# Patient Record
Sex: MALE | Race: WHITE | NOT HISPANIC OR LATINO | Employment: OTHER | URBAN - METROPOLITAN AREA
[De-identification: names, ages, dates, MRNs, and addresses within clinical notes are randomized per-mention and may not be internally consistent; named-entity substitution may affect disease eponyms.]

---

## 2017-01-01 ENCOUNTER — OFFICE VISIT (OUTPATIENT)
Dept: MEDICAL GROUP | Facility: MEDICAL CENTER | Age: 67
End: 2017-01-01
Attending: FAMILY MEDICINE
Payer: MEDICARE

## 2017-01-01 ENCOUNTER — HOME CARE VISIT (OUTPATIENT)
Dept: HOSPICE | Facility: HOSPICE | Age: 67
End: 2017-01-01
Payer: MEDICARE

## 2017-01-01 ENCOUNTER — TELEPHONE (OUTPATIENT)
Dept: CARDIOLOGY | Facility: MEDICAL CENTER | Age: 67
End: 2017-01-01

## 2017-01-01 ENCOUNTER — HOSPITAL ENCOUNTER (OUTPATIENT)
Dept: RADIOLOGY | Facility: MEDICAL CENTER | Age: 67
End: 2017-11-14
Attending: PHYSICIAN ASSISTANT
Payer: MEDICARE

## 2017-01-01 ENCOUNTER — APPOINTMENT (OUTPATIENT)
Dept: RADIOLOGY | Facility: MEDICAL CENTER | Age: 67
DRG: 435 | End: 2017-01-01
Attending: EMERGENCY MEDICINE
Payer: MEDICARE

## 2017-01-01 ENCOUNTER — HOSPITAL ENCOUNTER (OUTPATIENT)
Dept: RADIOLOGY | Facility: MEDICAL CENTER | Age: 67
End: 2017-11-14
Attending: FAMILY MEDICINE
Payer: MEDICARE

## 2017-01-01 ENCOUNTER — OFFICE VISIT (OUTPATIENT)
Dept: CARDIOLOGY | Facility: MEDICAL CENTER | Age: 67
End: 2017-01-01
Payer: MEDICARE

## 2017-01-01 ENCOUNTER — APPOINTMENT (OUTPATIENT)
Dept: RADIOLOGY | Facility: MEDICAL CENTER | Age: 67
DRG: 435 | End: 2017-01-01
Attending: INTERNAL MEDICINE
Payer: MEDICARE

## 2017-01-01 ENCOUNTER — HOSPITAL ENCOUNTER (OUTPATIENT)
Facility: MEDICAL CENTER | Age: 67
End: 2017-12-07
Attending: INTERNAL MEDICINE | Admitting: INTERNAL MEDICINE
Payer: COMMERCIAL

## 2017-01-01 ENCOUNTER — HOSPITAL ENCOUNTER (OUTPATIENT)
Facility: MEDICAL CENTER | Age: 67
End: 2017-01-01
Attending: INTERNAL MEDICINE | Admitting: INTERNAL MEDICINE
Payer: MEDICARE

## 2017-01-01 ENCOUNTER — APPOINTMENT (OUTPATIENT)
Dept: RADIOLOGY | Facility: MEDICAL CENTER | Age: 67
DRG: 948 | End: 2017-01-01
Attending: INTERNAL MEDICINE
Payer: COMMERCIAL

## 2017-01-01 ENCOUNTER — HOSPITAL ENCOUNTER (OUTPATIENT)
Dept: CARDIOLOGY | Facility: MEDICAL CENTER | Age: 67
End: 2017-02-28
Attending: INTERNAL MEDICINE
Payer: MEDICARE

## 2017-01-01 ENCOUNTER — TELEPHONE (OUTPATIENT)
Dept: MEDICAL GROUP | Facility: MEDICAL CENTER | Age: 67
End: 2017-01-01

## 2017-01-01 ENCOUNTER — APPOINTMENT (OUTPATIENT)
Dept: ADMISSIONS | Facility: MEDICAL CENTER | Age: 67
End: 2017-01-01
Payer: MEDICARE

## 2017-01-01 ENCOUNTER — HOSPITAL ENCOUNTER (INPATIENT)
Facility: MEDICAL CENTER | Age: 67
LOS: 6 days | DRG: 435 | End: 2017-12-13
Attending: INTERNAL MEDICINE | Admitting: INTERNAL MEDICINE
Payer: MEDICARE

## 2017-01-01 ENCOUNTER — APPOINTMENT (OUTPATIENT)
Dept: RADIOLOGY | Facility: MEDICAL CENTER | Age: 67
End: 2017-01-01
Attending: FAMILY MEDICINE
Payer: MEDICARE

## 2017-01-01 ENCOUNTER — HOSPITAL ENCOUNTER (OUTPATIENT)
Facility: MEDICAL CENTER | Age: 67
End: 2017-11-01
Attending: FAMILY MEDICINE
Payer: MEDICARE

## 2017-01-01 ENCOUNTER — HOSPICE ADMISSION (OUTPATIENT)
Dept: HOSPICE | Facility: HOSPICE | Age: 67
End: 2017-01-01
Payer: MEDICARE

## 2017-01-01 ENCOUNTER — HOSPITAL ENCOUNTER (OUTPATIENT)
Dept: LAB | Facility: MEDICAL CENTER | Age: 67
End: 2017-08-03
Attending: PHYSICIAN ASSISTANT
Payer: MEDICARE

## 2017-01-01 ENCOUNTER — HOSPITAL ENCOUNTER (INPATIENT)
Facility: MEDICAL CENTER | Age: 67
LOS: 1 days | DRG: 435 | End: 2017-12-07
Attending: EMERGENCY MEDICINE | Admitting: INTERNAL MEDICINE
Payer: MEDICARE

## 2017-01-01 ENCOUNTER — RESOLUTE PROFESSIONAL BILLING HOSPITAL PROF FEE (OUTPATIENT)
Dept: HOSPITALIST | Facility: MEDICAL CENTER | Age: 67
End: 2017-01-01
Payer: MEDICARE

## 2017-01-01 ENCOUNTER — APPOINTMENT (OUTPATIENT)
Dept: RADIOLOGY | Facility: MEDICAL CENTER | Age: 67
End: 2017-01-01
Attending: INTERNAL MEDICINE
Payer: COMMERCIAL

## 2017-01-01 ENCOUNTER — HOSPITAL ENCOUNTER (INPATIENT)
Facility: MEDICAL CENTER | Age: 67
LOS: 7 days | DRG: 948 | End: 2017-12-20
Attending: INTERNAL MEDICINE | Admitting: INTERNAL MEDICINE
Payer: COMMERCIAL

## 2017-01-01 ENCOUNTER — HOSPITAL ENCOUNTER (OUTPATIENT)
Facility: MEDICAL CENTER | Age: 67
End: 2017-10-11
Attending: FAMILY MEDICINE
Payer: MEDICARE

## 2017-01-01 ENCOUNTER — HOSPITAL ENCOUNTER (OUTPATIENT)
Dept: RADIOLOGY | Facility: MEDICAL CENTER | Age: 67
End: 2017-05-05
Attending: PHYSICIAN ASSISTANT
Payer: MEDICARE

## 2017-01-01 VITALS
TEMPERATURE: 100.4 F | HEART RATE: 92 BPM | SYSTOLIC BLOOD PRESSURE: 90 MMHG | RESPIRATION RATE: 24 BRPM | DIASTOLIC BLOOD PRESSURE: 50 MMHG

## 2017-01-01 VITALS
RESPIRATION RATE: 16 BRPM | HEIGHT: 70 IN | TEMPERATURE: 98.2 F | DIASTOLIC BLOOD PRESSURE: 76 MMHG | OXYGEN SATURATION: 95 % | WEIGHT: 247 LBS | SYSTOLIC BLOOD PRESSURE: 120 MMHG | HEART RATE: 60 BPM | BODY MASS INDEX: 35.36 KG/M2

## 2017-01-01 VITALS
BODY MASS INDEX: 32.07 KG/M2 | OXYGEN SATURATION: 95 % | TEMPERATURE: 97.7 F | SYSTOLIC BLOOD PRESSURE: 125 MMHG | HEIGHT: 70 IN | DIASTOLIC BLOOD PRESSURE: 80 MMHG | WEIGHT: 224 LBS | RESPIRATION RATE: 20 BRPM | HEART RATE: 80 BPM

## 2017-01-01 VITALS
OXYGEN SATURATION: 99 % | RESPIRATION RATE: 24 BRPM | HEART RATE: 108 BPM | DIASTOLIC BLOOD PRESSURE: 57 MMHG | SYSTOLIC BLOOD PRESSURE: 91 MMHG | TEMPERATURE: 103.5 F | WEIGHT: 215.39 LBS | BODY MASS INDEX: 30.91 KG/M2

## 2017-01-01 VITALS
DIASTOLIC BLOOD PRESSURE: 69 MMHG | OXYGEN SATURATION: 96 % | RESPIRATION RATE: 19 BRPM | HEART RATE: 71 BPM | WEIGHT: 220.02 LBS | HEIGHT: 70 IN | BODY MASS INDEX: 31.5 KG/M2 | SYSTOLIC BLOOD PRESSURE: 97 MMHG | TEMPERATURE: 97.3 F

## 2017-01-01 VITALS
WEIGHT: 220 LBS | SYSTOLIC BLOOD PRESSURE: 107 MMHG | DIASTOLIC BLOOD PRESSURE: 67 MMHG | BODY MASS INDEX: 31.5 KG/M2 | TEMPERATURE: 97.8 F | OXYGEN SATURATION: 98 % | HEIGHT: 70 IN | RESPIRATION RATE: 17 BRPM | HEART RATE: 50 BPM

## 2017-01-01 VITALS
HEIGHT: 70 IN | OXYGEN SATURATION: 88 % | HEART RATE: 80 BPM | TEMPERATURE: 97.2 F | SYSTOLIC BLOOD PRESSURE: 118 MMHG | WEIGHT: 218 LBS | DIASTOLIC BLOOD PRESSURE: 68 MMHG | BODY MASS INDEX: 31.21 KG/M2

## 2017-01-01 VITALS
HEART RATE: 64 BPM | DIASTOLIC BLOOD PRESSURE: 62 MMHG | SYSTOLIC BLOOD PRESSURE: 124 MMHG | TEMPERATURE: 97.6 F | HEIGHT: 70 IN | BODY MASS INDEX: 32.07 KG/M2 | OXYGEN SATURATION: 96 % | RESPIRATION RATE: 20 BRPM | WEIGHT: 224 LBS

## 2017-01-01 VITALS
RESPIRATION RATE: 18 BRPM | DIASTOLIC BLOOD PRESSURE: 78 MMHG | HEART RATE: 81 BPM | BODY MASS INDEX: 30.78 KG/M2 | OXYGEN SATURATION: 98 % | HEIGHT: 70 IN | SYSTOLIC BLOOD PRESSURE: 132 MMHG | WEIGHT: 215 LBS

## 2017-01-01 VITALS
BODY MASS INDEX: 35.5 KG/M2 | SYSTOLIC BLOOD PRESSURE: 130 MMHG | HEART RATE: 80 BPM | RESPIRATION RATE: 16 BRPM | HEIGHT: 70 IN | OXYGEN SATURATION: 95 % | WEIGHT: 248 LBS | TEMPERATURE: 97.8 F | DIASTOLIC BLOOD PRESSURE: 80 MMHG

## 2017-01-01 VITALS — DIASTOLIC BLOOD PRESSURE: 76 MMHG | RESPIRATION RATE: 20 BRPM | SYSTOLIC BLOOD PRESSURE: 103 MMHG | HEART RATE: 92 BPM

## 2017-01-01 VITALS
BODY MASS INDEX: 35.36 KG/M2 | SYSTOLIC BLOOD PRESSURE: 112 MMHG | DIASTOLIC BLOOD PRESSURE: 78 MMHG | OXYGEN SATURATION: 94 % | WEIGHT: 247 LBS | HEIGHT: 70 IN | HEART RATE: 68 BPM

## 2017-01-01 VITALS
HEART RATE: 60 BPM | DIASTOLIC BLOOD PRESSURE: 70 MMHG | TEMPERATURE: 97.3 F | OXYGEN SATURATION: 96 % | RESPIRATION RATE: 16 BRPM | BODY MASS INDEX: 31.85 KG/M2 | WEIGHT: 222 LBS | SYSTOLIC BLOOD PRESSURE: 112 MMHG

## 2017-01-01 VITALS
WEIGHT: 215.39 LBS | BODY MASS INDEX: 30.84 KG/M2 | HEIGHT: 70 IN | SYSTOLIC BLOOD PRESSURE: 132 MMHG | TEMPERATURE: 98.8 F | HEART RATE: 81 BPM | OXYGEN SATURATION: 95 % | DIASTOLIC BLOOD PRESSURE: 78 MMHG | RESPIRATION RATE: 17 BRPM

## 2017-01-01 VITALS
RESPIRATION RATE: 20 BRPM | HEIGHT: 70 IN | WEIGHT: 220 LBS | TEMPERATURE: 96.5 F | DIASTOLIC BLOOD PRESSURE: 80 MMHG | HEART RATE: 53 BPM | BODY MASS INDEX: 31.5 KG/M2 | SYSTOLIC BLOOD PRESSURE: 125 MMHG | OXYGEN SATURATION: 97 %

## 2017-01-01 VITALS
HEART RATE: 66 BPM | RESPIRATION RATE: 16 BRPM | TEMPERATURE: 97.4 F | DIASTOLIC BLOOD PRESSURE: 76 MMHG | SYSTOLIC BLOOD PRESSURE: 107 MMHG

## 2017-01-01 VITALS
SYSTOLIC BLOOD PRESSURE: 131 MMHG | RESPIRATION RATE: 12 BRPM | DIASTOLIC BLOOD PRESSURE: 82 MMHG | OXYGEN SATURATION: 96 % | HEART RATE: 73 BPM

## 2017-01-01 VITALS
OXYGEN SATURATION: 94 % | HEART RATE: 60 BPM | DIASTOLIC BLOOD PRESSURE: 80 MMHG | HEIGHT: 70 IN | SYSTOLIC BLOOD PRESSURE: 128 MMHG

## 2017-01-01 DIAGNOSIS — R19.7 DIARRHEA, UNSPECIFIED TYPE: ICD-10-CM

## 2017-01-01 DIAGNOSIS — Z23 NEED FOR TDAP VACCINATION: ICD-10-CM

## 2017-01-01 DIAGNOSIS — Z86.19 H/O HEPATITIS: ICD-10-CM

## 2017-01-01 DIAGNOSIS — R35.0 FREQUENCY OF MICTURITION: ICD-10-CM

## 2017-01-01 DIAGNOSIS — G89.29 CHRONIC LOW BACK PAIN WITH SCIATICA, SCIATICA LATERALITY UNSPECIFIED, UNSPECIFIED BACK PAIN LATERALITY: ICD-10-CM

## 2017-01-01 DIAGNOSIS — B18.2 CHRONIC HEPATITIS C WITHOUT HEPATIC COMA (HCC): ICD-10-CM

## 2017-01-01 DIAGNOSIS — B19.20 HEPATITIS C VIRUS INFECTION WITHOUT HEPATIC COMA, UNSPECIFIED CHRONICITY: ICD-10-CM

## 2017-01-01 DIAGNOSIS — M54.40 CHRONIC LOW BACK PAIN WITH SCIATICA, SCIATICA LATERALITY UNSPECIFIED, UNSPECIFIED BACK PAIN LATERALITY: ICD-10-CM

## 2017-01-01 DIAGNOSIS — E80.6 HYPERBILIRUBINEMIA: ICD-10-CM

## 2017-01-01 DIAGNOSIS — R17 JAUNDICE: ICD-10-CM

## 2017-01-01 DIAGNOSIS — I77.810 ASCENDING AORTA DILATION (HCC): ICD-10-CM

## 2017-01-01 DIAGNOSIS — N40.1 BENIGN PROSTATIC HYPERPLASIA WITH LOWER URINARY TRACT SYMPTOMS, SYMPTOM DETAILS UNSPECIFIED: ICD-10-CM

## 2017-01-01 DIAGNOSIS — J43.9 PULMONARY EMPHYSEMA, UNSPECIFIED EMPHYSEMA TYPE (HCC): ICD-10-CM

## 2017-01-01 DIAGNOSIS — K74.69 FLORID CIRRHOSIS (HCC): ICD-10-CM

## 2017-01-01 DIAGNOSIS — Z23 NEED FOR PNEUMOCOCCAL VACCINE: ICD-10-CM

## 2017-01-01 DIAGNOSIS — K86.89 MASS OF PANCREAS: ICD-10-CM

## 2017-01-01 DIAGNOSIS — E66.9 OBESITY (BMI 35.0-39.9 WITHOUT COMORBIDITY): ICD-10-CM

## 2017-01-01 DIAGNOSIS — R35.0 INCREASED FREQUENCY OF URINATION: ICD-10-CM

## 2017-01-01 DIAGNOSIS — J43.8 OTHER EMPHYSEMA (HCC): ICD-10-CM

## 2017-01-01 DIAGNOSIS — Z99.81 DEPENDENCE ON SUPPLEMENTAL OXYGEN: ICD-10-CM

## 2017-01-01 DIAGNOSIS — R06.00 DYSPNEA, UNSPECIFIED TYPE: ICD-10-CM

## 2017-01-01 DIAGNOSIS — G89.4 CHRONIC PAIN SYNDROME: ICD-10-CM

## 2017-01-01 DIAGNOSIS — R06.09 DOE (DYSPNEA ON EXERTION): ICD-10-CM

## 2017-01-01 DIAGNOSIS — K86.89 PANCREATIC MASS: ICD-10-CM

## 2017-01-01 DIAGNOSIS — Z91.89 STREPTOCOCCUS PNEUMONIAE VACCINATION INDICATED: ICD-10-CM

## 2017-01-01 DIAGNOSIS — C25.9 MALIGNANT NEOPLASM OF PANCREAS, UNSPECIFIED LOCATION OF MALIGNANCY (HCC): ICD-10-CM

## 2017-01-01 DIAGNOSIS — B18.2 CHRONIC HEPATITIS C WITH HEPATIC COMA (HCC): ICD-10-CM

## 2017-01-01 LAB
ABO GROUP BLD: NORMAL
ABO GROUP BLD: NORMAL
AFP L3 MFR SERPL: <0.5 % (ref 0–9.9)
AFP SERPL-MCNC: 2 NG/ML (ref 0–15)
ALBUMIN SERPL BCP-MCNC: 2.7 G/DL (ref 3.2–4.9)
ALBUMIN SERPL BCP-MCNC: 2.8 G/DL (ref 3.2–4.9)
ALBUMIN SERPL BCP-MCNC: 3 G/DL (ref 3.2–4.9)
ALBUMIN SERPL BCP-MCNC: 3.1 G/DL (ref 3.2–4.9)
ALBUMIN SERPL BCP-MCNC: 3.2 G/DL (ref 3.2–4.9)
ALBUMIN SERPL BCP-MCNC: 4.4 G/DL (ref 3.2–4.9)
ALBUMIN/GLOB SERPL: 0.9 G/DL
ALBUMIN/GLOB SERPL: 0.9 G/DL
ALBUMIN/GLOB SERPL: 1.1 G/DL
ALBUMIN/GLOB SERPL: 1.1 G/DL
ALBUMIN/GLOB SERPL: 1.2 G/DL
ALBUMIN/GLOB SERPL: 1.3 G/DL
ALBUMIN/GLOB SERPL: 1.3 G/DL
ALP SERPL-CCNC: 137 U/L (ref 30–99)
ALP SERPL-CCNC: 167 U/L (ref 30–99)
ALP SERPL-CCNC: 205 U/L (ref 30–99)
ALP SERPL-CCNC: 241 U/L (ref 30–99)
ALP SERPL-CCNC: 257 U/L (ref 30–99)
ALP SERPL-CCNC: 283 U/L (ref 30–99)
ALP SERPL-CCNC: 292 U/L (ref 30–99)
ALP SERPL-CCNC: 353 U/L (ref 30–99)
ALP SERPL-CCNC: 99 U/L (ref 30–99)
ALT SERPL-CCNC: 21 U/L (ref 2–50)
ALT SERPL-CCNC: 25 U/L (ref 2–50)
ALT SERPL-CCNC: 31 U/L (ref 2–50)
ALT SERPL-CCNC: 33 U/L (ref 2–50)
ALT SERPL-CCNC: 40 U/L (ref 2–50)
ALT SERPL-CCNC: 44 U/L (ref 2–50)
ALT SERPL-CCNC: 48 U/L (ref 2–50)
ALT SERPL-CCNC: 54 U/L (ref 2–50)
ALT SERPL-CCNC: 71 U/L (ref 2–50)
ANION GAP SERPL CALC-SCNC: 10 MMOL/L (ref 0–11.9)
ANION GAP SERPL CALC-SCNC: 11 MMOL/L (ref 0–11.9)
ANION GAP SERPL CALC-SCNC: 11 MMOL/L (ref 0–11.9)
ANION GAP SERPL CALC-SCNC: 6 MMOL/L (ref 0–11.9)
ANION GAP SERPL CALC-SCNC: 6 MMOL/L (ref 0–11.9)
ANION GAP SERPL CALC-SCNC: 8 MMOL/L (ref 0–11.9)
ANION GAP SERPL CALC-SCNC: 9 MMOL/L (ref 0–11.9)
ANISOCYTOSIS BLD QL SMEAR: ABNORMAL
AST SERPL-CCNC: 102 U/L (ref 12–45)
AST SERPL-CCNC: 27 U/L (ref 12–45)
AST SERPL-CCNC: 37 U/L (ref 12–45)
AST SERPL-CCNC: 41 U/L (ref 12–45)
AST SERPL-CCNC: 49 U/L (ref 12–45)
AST SERPL-CCNC: 64 U/L (ref 12–45)
AST SERPL-CCNC: 78 U/L (ref 12–45)
AST SERPL-CCNC: 82 U/L (ref 12–45)
AST SERPL-CCNC: 87 U/L (ref 12–45)
BACTERIA BLD CULT: NORMAL
BACTERIA BLD CULT: NORMAL
BACTERIA FLD AEROBE CULT: NORMAL
BACTERIA SPEC ANAEROBE CULT: NORMAL
BACTERIA STL CULT: NORMAL
BACTERIA UR CULT: NORMAL
BARCODED ABORH UBTYP: 6200
BARCODED PRD CODE UBPRD: NORMAL
BARCODED UNIT NUM UBUNT: NORMAL
BASOPHILS # BLD AUTO: 0 % (ref 0–1.8)
BASOPHILS # BLD AUTO: 0.4 % (ref 0–1.8)
BASOPHILS # BLD AUTO: 0.7 % (ref 0–1.8)
BASOPHILS # BLD AUTO: 0.7 % (ref 0–1.8)
BASOPHILS # BLD: 0 K/UL (ref 0–0.12)
BASOPHILS # BLD: 0.02 K/UL (ref 0–0.12)
BASOPHILS # BLD: 0.05 K/UL (ref 0–0.12)
BASOPHILS # BLD: 0.06 K/UL (ref 0–0.12)
BILIRUB SERPL-MCNC: 0.6 MG/DL (ref 0.1–1.5)
BILIRUB SERPL-MCNC: 16.3 MG/DL (ref 0.1–1.5)
BILIRUB SERPL-MCNC: 20.1 MG/DL (ref 0.1–1.5)
BILIRUB SERPL-MCNC: 21.8 MG/DL (ref 0.1–1.5)
BILIRUB SERPL-MCNC: 32.3 MG/DL (ref 0.1–1.5)
BILIRUB SERPL-MCNC: 34.8 MG/DL (ref 0.1–1.5)
BILIRUB SERPL-MCNC: 38.5 MG/DL (ref 0.1–1.5)
BILIRUB SERPL-MCNC: 38.6 MG/DL (ref 0.1–1.5)
BILIRUB SERPL-MCNC: 39.3 MG/DL (ref 0.1–1.5)
BLD GP AB SCN SERPL QL: NORMAL
BUN SERPL-MCNC: 10 MG/DL (ref 8–22)
BUN SERPL-MCNC: 11 MG/DL (ref 8–22)
BUN SERPL-MCNC: 12 MG/DL (ref 8–22)
BUN SERPL-MCNC: 13 MG/DL (ref 8–22)
BUN SERPL-MCNC: 14 MG/DL (ref 8–22)
BUN SERPL-MCNC: 15 MG/DL (ref 8–22)
BUN SERPL-MCNC: 15 MG/DL (ref 8–22)
BUN SERPL-MCNC: 18 MG/DL (ref 8–22)
BUN SERPL-MCNC: 25 MG/DL (ref 8–22)
C DIFF DNA SPEC QL NAA+PROBE: NEGATIVE
C DIFF TOX GENS STL QL NAA+PROBE: NEGATIVE
CALCIUM SERPL-MCNC: 8.2 MG/DL (ref 8.5–10.5)
CALCIUM SERPL-MCNC: 8.4 MG/DL (ref 8.5–10.5)
CALCIUM SERPL-MCNC: 8.5 MG/DL (ref 8.5–10.5)
CALCIUM SERPL-MCNC: 8.5 MG/DL (ref 8.5–10.5)
CALCIUM SERPL-MCNC: 8.6 MG/DL (ref 8.5–10.5)
CALCIUM SERPL-MCNC: 8.7 MG/DL (ref 8.5–10.5)
CALCIUM SERPL-MCNC: 8.7 MG/DL (ref 8.5–10.5)
CALCIUM SERPL-MCNC: 8.9 MG/DL (ref 8.5–10.5)
CALCIUM SERPL-MCNC: 9.3 MG/DL (ref 8.5–10.5)
CANCER AG125 SERPL-ACNC: 62.2 U/ML (ref 0–35)
CANCER AG19-9 SERPL-ACNC: ABNORMAL U/ML (ref 0–35)
CEA SERPL-MCNC: 20.3 NG/ML (ref 0–3)
CHLORIDE SERPL-SCNC: 101 MMOL/L (ref 96–112)
CHLORIDE SERPL-SCNC: 103 MMOL/L (ref 96–112)
CHLORIDE SERPL-SCNC: 103 MMOL/L (ref 96–112)
CHLORIDE SERPL-SCNC: 104 MMOL/L (ref 96–112)
CHLORIDE SERPL-SCNC: 105 MMOL/L (ref 96–112)
CHLORIDE SERPL-SCNC: 105 MMOL/L (ref 96–112)
CHLORIDE SERPL-SCNC: 106 MMOL/L (ref 96–112)
CO2 SERPL-SCNC: 21 MMOL/L (ref 20–33)
CO2 SERPL-SCNC: 21 MMOL/L (ref 20–33)
CO2 SERPL-SCNC: 22 MMOL/L (ref 20–33)
CO2 SERPL-SCNC: 23 MMOL/L (ref 20–33)
CO2 SERPL-SCNC: 23 MMOL/L (ref 20–33)
CO2 SERPL-SCNC: 24 MMOL/L (ref 20–33)
CO2 SERPL-SCNC: 25 MMOL/L (ref 20–33)
CO2 SERPL-SCNC: 25 MMOL/L (ref 20–33)
CO2 SERPL-SCNC: 26 MMOL/L (ref 20–33)
COMMENT 1642: NORMAL
COMPONENT FT 8504FT: NORMAL
CREAT SERPL-MCNC: 0.86 MG/DL (ref 0.5–1.4)
CREAT SERPL-MCNC: 0.9 MG/DL (ref 0.5–1.4)
CREAT SERPL-MCNC: 0.91 MG/DL (ref 0.5–1.4)
CREAT SERPL-MCNC: 0.93 MG/DL (ref 0.5–1.4)
CREAT SERPL-MCNC: 0.93 MG/DL (ref 0.5–1.4)
CREAT SERPL-MCNC: 0.96 MG/DL (ref 0.5–1.4)
CREAT SERPL-MCNC: 0.98 MG/DL (ref 0.5–1.4)
CREAT SERPL-MCNC: 1.05 MG/DL (ref 0.5–1.4)
CREAT SERPL-MCNC: 1.05 MG/DL (ref 0.5–1.4)
E COLI SXT1+2 STL IA: NORMAL
E COLI SXT1+2 STL IA: NORMAL
EKG IMPRESSION: NORMAL
EOSINOPHIL # BLD AUTO: 0 K/UL (ref 0–0.51)
EOSINOPHIL # BLD AUTO: 0.01 K/UL (ref 0–0.51)
EOSINOPHIL # BLD AUTO: 0.06 K/UL (ref 0–0.51)
EOSINOPHIL # BLD AUTO: 0.09 K/UL (ref 0–0.51)
EOSINOPHIL NFR BLD: 0 % (ref 0–6.9)
EOSINOPHIL NFR BLD: 0.1 % (ref 0–6.9)
EOSINOPHIL NFR BLD: 1.1 % (ref 0–6.9)
EOSINOPHIL NFR BLD: 1.3 % (ref 0–6.9)
ERYTHROCYTE [DISTWIDTH] IN BLOOD BY AUTOMATED COUNT: 77.1 FL (ref 35.9–50)
ERYTHROCYTE [DISTWIDTH] IN BLOOD BY AUTOMATED COUNT: 77.2 FL (ref 35.9–50)
ERYTHROCYTE [DISTWIDTH] IN BLOOD BY AUTOMATED COUNT: 79.1 FL (ref 35.9–50)
ERYTHROCYTE [DISTWIDTH] IN BLOOD BY AUTOMATED COUNT: 79.6 FL (ref 35.9–50)
GFR SERPL CREATININE-BSD FRML MDRD: >60 ML/MIN/1.73 M 2
GLOBULIN SER CALC-MCNC: 2.3 G/DL (ref 1.9–3.5)
GLOBULIN SER CALC-MCNC: 2.3 G/DL (ref 1.9–3.5)
GLOBULIN SER CALC-MCNC: 2.4 G/DL (ref 1.9–3.5)
GLOBULIN SER CALC-MCNC: 2.5 G/DL (ref 1.9–3.5)
GLOBULIN SER CALC-MCNC: 2.6 G/DL (ref 1.9–3.5)
GLOBULIN SER CALC-MCNC: 3 G/DL (ref 1.9–3.5)
GLOBULIN SER CALC-MCNC: 3.1 G/DL (ref 1.9–3.5)
GLOBULIN SER CALC-MCNC: 3.2 G/DL (ref 1.9–3.5)
GLOBULIN SER CALC-MCNC: 3.5 G/DL (ref 1.9–3.5)
GLUCOSE SERPL-MCNC: 102 MG/DL (ref 65–99)
GLUCOSE SERPL-MCNC: 117 MG/DL (ref 65–99)
GLUCOSE SERPL-MCNC: 119 MG/DL (ref 65–99)
GLUCOSE SERPL-MCNC: 123 MG/DL (ref 65–99)
GLUCOSE SERPL-MCNC: 123 MG/DL (ref 65–99)
GLUCOSE SERPL-MCNC: 144 MG/DL (ref 65–99)
GLUCOSE SERPL-MCNC: 90 MG/DL (ref 65–99)
GLUCOSE SERPL-MCNC: 91 MG/DL (ref 65–99)
GLUCOSE SERPL-MCNC: 93 MG/DL (ref 65–99)
GRAM STN SPEC: NORMAL
GRAM STN SPEC: NORMAL
HCT VFR BLD AUTO: 24.9 % (ref 42–52)
HCT VFR BLD AUTO: 26.1 % (ref 42–52)
HCT VFR BLD AUTO: 26.8 % (ref 42–52)
HCT VFR BLD AUTO: 29.1 % (ref 42–52)
HCV RNA SERPL NAA+PROBE-ACNC: NORMAL IU/ML
HGB BLD-MCNC: 10.5 G/DL (ref 14–18)
HGB BLD-MCNC: 8.9 G/DL (ref 14–18)
HGB BLD-MCNC: 9.2 G/DL (ref 14–18)
HGB BLD-MCNC: 9.6 G/DL (ref 14–18)
IMM GRANULOCYTES # BLD AUTO: 0.12 K/UL (ref 0–0.11)
IMM GRANULOCYTES # BLD AUTO: 0.13 K/UL (ref 0–0.11)
IMM GRANULOCYTES # BLD AUTO: 0.14 K/UL (ref 0–0.11)
IMM GRANULOCYTES NFR BLD AUTO: 1.5 % (ref 0–0.9)
IMM GRANULOCYTES NFR BLD AUTO: 1.9 % (ref 0–0.9)
IMM GRANULOCYTES NFR BLD AUTO: 2.7 % (ref 0–0.9)
INR PPP: 1.16 (ref 0.87–1.13)
INR PPP: 1.22 (ref 0.87–1.13)
INR PPP: 1.74 (ref 0.87–1.13)
LG PLATELETS BLD QL SMEAR: NORMAL
LIPASE SERPL-CCNC: 158 U/L (ref 11–82)
LV EJECT FRACT  99904: 65
LV EJECT FRACT MOD 2C 99903: 63.88
LV EJECT FRACT MOD 4C 99902: 71.69
LV EJECT FRACT MOD BP 99901: 68.09
LYMPHOCYTES # BLD AUTO: 0.39 K/UL (ref 1–4.8)
LYMPHOCYTES # BLD AUTO: 0.48 K/UL (ref 1–4.8)
LYMPHOCYTES # BLD AUTO: 0.64 K/UL (ref 1–4.8)
LYMPHOCYTES # BLD AUTO: 0.64 K/UL (ref 1–4.8)
LYMPHOCYTES NFR BLD: 12.2 % (ref 22–41)
LYMPHOCYTES NFR BLD: 4.8 % (ref 22–41)
LYMPHOCYTES NFR BLD: 9.4 % (ref 22–41)
LYMPHOCYTES NFR BLD: 9.6 % (ref 22–41)
MACROCYTES BLD QL SMEAR: ABNORMAL
MAGNESIUM SERPL-MCNC: 1.9 MG/DL (ref 1.5–2.5)
MANUAL DIFF BLD: NORMAL
MCH RBC QN AUTO: 31.6 PG (ref 27–33)
MCH RBC QN AUTO: 31.7 PG (ref 27–33)
MCH RBC QN AUTO: 32.1 PG (ref 27–33)
MCH RBC QN AUTO: 33.2 PG (ref 27–33)
MCHC RBC AUTO-ENTMCNC: 35.2 G/DL (ref 33.7–35.3)
MCHC RBC AUTO-ENTMCNC: 35.7 G/DL (ref 33.7–35.3)
MCHC RBC AUTO-ENTMCNC: 35.8 G/DL (ref 33.7–35.3)
MCHC RBC AUTO-ENTMCNC: 36.1 G/DL (ref 33.7–35.3)
MCV RBC AUTO: 87.7 FL (ref 81.4–97.8)
MCV RBC AUTO: 88.4 FL (ref 81.4–97.8)
MCV RBC AUTO: 90.9 FL (ref 81.4–97.8)
MCV RBC AUTO: 92.9 FL (ref 81.4–97.8)
MONOCYTES # BLD AUTO: 0.26 K/UL (ref 0–0.85)
MONOCYTES # BLD AUTO: 0.52 K/UL (ref 0–0.85)
MONOCYTES # BLD AUTO: 0.58 K/UL (ref 0–0.85)
MONOCYTES # BLD AUTO: 0.7 K/UL (ref 0–0.85)
MONOCYTES NFR BLD AUTO: 5.2 % (ref 0–13.4)
MONOCYTES NFR BLD AUTO: 8.5 % (ref 0–13.4)
MONOCYTES NFR BLD AUTO: 8.6 % (ref 0–13.4)
MONOCYTES NFR BLD AUTO: 9.9 % (ref 0–13.4)
MORPHOLOGY BLD-IMP: NORMAL
MORPHOLOGY BLD-IMP: NORMAL
NEUTROPHILS # BLD AUTO: 3.86 K/UL (ref 1.82–7.42)
NEUTROPHILS # BLD AUTO: 4.26 K/UL (ref 1.82–7.42)
NEUTROPHILS # BLD AUTO: 5.3 K/UL (ref 1.82–7.42)
NEUTROPHILS # BLD AUTO: 6.84 K/UL (ref 1.82–7.42)
NEUTROPHILS NFR BLD: 73.7 % (ref 44–72)
NEUTROPHILS NFR BLD: 78.2 % (ref 44–72)
NEUTROPHILS NFR BLD: 82.6 % (ref 44–72)
NEUTROPHILS NFR BLD: 84.3 % (ref 44–72)
NEUTS BAND NFR BLD MANUAL: 2.6 % (ref 0–10)
NRBC # BLD AUTO: 0 K/UL
NRBC BLD AUTO-RTO: 0 /100 WBC
PATHOLOGY CONSULT NOTE: NORMAL
PHOSPHATE SERPL-MCNC: 2.3 MG/DL (ref 2.5–4.5)
PLATELET # BLD AUTO: 185 K/UL (ref 164–446)
PLATELET # BLD AUTO: 193 K/UL (ref 164–446)
PLATELET # BLD AUTO: 220 K/UL (ref 164–446)
PLATELET # BLD AUTO: 232 K/UL (ref 164–446)
PLATELET BLD QL SMEAR: NORMAL
PLATELET BLD QL SMEAR: NORMAL
PMV BLD AUTO: 11.2 FL (ref 9–12.9)
PMV BLD AUTO: 11.3 FL (ref 9–12.9)
PMV BLD AUTO: 11.4 FL (ref 9–12.9)
PMV BLD AUTO: 12 FL (ref 9–12.9)
POIKILOCYTOSIS BLD QL SMEAR: NORMAL
POIKILOCYTOSIS BLD QL SMEAR: NORMAL
POLYCHROMASIA BLD QL SMEAR: NORMAL
POTASSIUM SERPL-SCNC: 3.6 MMOL/L (ref 3.6–5.5)
POTASSIUM SERPL-SCNC: 3.6 MMOL/L (ref 3.6–5.5)
POTASSIUM SERPL-SCNC: 3.7 MMOL/L (ref 3.6–5.5)
POTASSIUM SERPL-SCNC: 3.8 MMOL/L (ref 3.6–5.5)
POTASSIUM SERPL-SCNC: 4 MMOL/L (ref 3.6–5.5)
POTASSIUM SERPL-SCNC: 4 MMOL/L (ref 3.6–5.5)
POTASSIUM SERPL-SCNC: 4.1 MMOL/L (ref 3.6–5.5)
POTASSIUM SERPL-SCNC: 4.3 MMOL/L (ref 3.6–5.5)
POTASSIUM SERPL-SCNC: 4.3 MMOL/L (ref 3.6–5.5)
PRODUCT TYPE UPROD: NORMAL
PROT SERPL-MCNC: 5 G/DL (ref 6–8.2)
PROT SERPL-MCNC: 5.2 G/DL (ref 6–8.2)
PROT SERPL-MCNC: 5.3 G/DL (ref 6–8.2)
PROT SERPL-MCNC: 5.3 G/DL (ref 6–8.2)
PROT SERPL-MCNC: 5.7 G/DL (ref 6–8.2)
PROT SERPL-MCNC: 5.9 G/DL (ref 6–8.2)
PROT SERPL-MCNC: 6 G/DL (ref 6–8.2)
PROT SERPL-MCNC: 6.2 G/DL (ref 6–8.2)
PROT SERPL-MCNC: 7.9 G/DL (ref 6–8.2)
PROTHROMBIN TIME: 14.5 SEC (ref 12–14.6)
PROTHROMBIN TIME: 15.1 SEC (ref 12–14.6)
PROTHROMBIN TIME: 20 SEC (ref 12–14.6)
RBC # BLD AUTO: 2.68 M/UL (ref 4.7–6.1)
RBC # BLD AUTO: 2.87 M/UL (ref 4.7–6.1)
RBC # BLD AUTO: 3.03 M/UL (ref 4.7–6.1)
RBC # BLD AUTO: 3.32 M/UL (ref 4.7–6.1)
RBC BLD AUTO: PRESENT
RBC BLD AUTO: PRESENT
RH BLD: NORMAL
RH BLD: NORMAL
SIGNIFICANT IND 70042: NORMAL
SITE SITE: NORMAL
SODIUM SERPL-SCNC: 133 MMOL/L (ref 135–145)
SODIUM SERPL-SCNC: 134 MMOL/L (ref 135–145)
SODIUM SERPL-SCNC: 134 MMOL/L (ref 135–145)
SODIUM SERPL-SCNC: 135 MMOL/L (ref 135–145)
SODIUM SERPL-SCNC: 136 MMOL/L (ref 135–145)
SODIUM SERPL-SCNC: 138 MMOL/L (ref 135–145)
SODIUM SERPL-SCNC: 140 MMOL/L (ref 135–145)
SOURCE SOURCE: NORMAL
TARGETS BLD QL SMEAR: NORMAL
TARGETS BLD QL SMEAR: NORMAL
TEST INFO  93644: NORMAL
UNIT STATUS USTAT: NORMAL
WBC # BLD AUTO: 5 K/UL (ref 4.8–10.8)
WBC # BLD AUTO: 5.2 K/UL (ref 4.8–10.8)
WBC # BLD AUTO: 6.8 K/UL (ref 4.8–10.8)
WBC # BLD AUTO: 8.1 K/UL (ref 4.8–10.8)

## 2017-01-01 PROCEDURE — A9270 NON-COVERED ITEM OR SERVICE: HCPCS | Performed by: INTERNAL MEDICINE

## 2017-01-01 PROCEDURE — 36415 COLL VENOUS BLD VENIPUNCTURE: CPT

## 2017-01-01 PROCEDURE — 82107 ALPHA-FETOPROTEIN L3: CPT

## 2017-01-01 PROCEDURE — 99214 OFFICE O/P EST MOD 30 MIN: CPT | Performed by: FAMILY MEDICINE

## 2017-01-01 PROCEDURE — 99213 OFFICE O/P EST LOW 20 MIN: CPT | Mod: 25 | Performed by: FAMILY MEDICINE

## 2017-01-01 PROCEDURE — 700102 HCHG RX REV CODE 250 W/ 637 OVERRIDE(OP): Performed by: STUDENT IN AN ORGANIZED HEALTH CARE EDUCATION/TRAINING PROGRAM

## 2017-01-01 PROCEDURE — 700111 HCHG RX REV CODE 636 W/ 250 OVERRIDE (IP): Performed by: INTERNAL MEDICINE

## 2017-01-01 PROCEDURE — 700102 HCHG RX REV CODE 250 W/ 637 OVERRIDE(OP): Performed by: INTERNAL MEDICINE

## 2017-01-01 PROCEDURE — 700111 HCHG RX REV CODE 636 W/ 250 OVERRIDE (IP): Performed by: STUDENT IN AN ORGANIZED HEALTH CARE EDUCATION/TRAINING PROGRAM

## 2017-01-01 PROCEDURE — 80053 COMPREHEN METABOLIC PANEL: CPT

## 2017-01-01 PROCEDURE — 700111 HCHG RX REV CODE 636 W/ 250 OVERRIDE (IP)

## 2017-01-01 PROCEDURE — 86304 IMMUNOASSAY TUMOR CA 125: CPT

## 2017-01-01 PROCEDURE — 88312 SPECIAL STAINS GROUP 1: CPT

## 2017-01-01 PROCEDURE — A9270 NON-COVERED ITEM OR SERVICE: HCPCS | Performed by: STUDENT IN AN ORGANIZED HEALTH CARE EDUCATION/TRAINING PROGRAM

## 2017-01-01 PROCEDURE — 700117 HCHG RX CONTRAST REV CODE 255: Performed by: RADIOLOGY

## 2017-01-01 PROCEDURE — 85025 COMPLETE CBC W/AUTO DIFF WBC: CPT

## 2017-01-01 PROCEDURE — 87086 URINE CULTURE/COLONY COUNT: CPT

## 2017-01-01 PROCEDURE — 36430 TRANSFUSION BLD/BLD COMPNT: CPT

## 2017-01-01 PROCEDURE — 99223 1ST HOSP IP/OBS HIGH 75: CPT | Mod: 25 | Performed by: INTERNAL MEDICINE

## 2017-01-01 PROCEDURE — 87045 FECES CULTURE AEROBIC BACT: CPT

## 2017-01-01 PROCEDURE — 99232 SBSQ HOSP IP/OBS MODERATE 35: CPT | Performed by: INTERNAL MEDICINE

## 2017-01-01 PROCEDURE — G8432 DEP SCR NOT DOC, RNG: HCPCS | Performed by: FAMILY MEDICINE

## 2017-01-01 PROCEDURE — 770006 HCHG ROOM/CARE - MED/SURG/GYN SEMI*

## 2017-01-01 PROCEDURE — 88305 TISSUE EXAM BY PATHOLOGIST: CPT

## 2017-01-01 PROCEDURE — 700111 HCHG RX REV CODE 636 W/ 250 OVERRIDE (IP): Performed by: RADIOLOGY

## 2017-01-01 PROCEDURE — 700112 HCHG RX REV CODE 229: Performed by: INTERNAL MEDICINE

## 2017-01-01 PROCEDURE — 99213 OFFICE O/P EST LOW 20 MIN: CPT | Performed by: FAMILY MEDICINE

## 2017-01-01 PROCEDURE — BF10YZZ FLUOROSCOPY OF BILE DUCTS USING OTHER CONTRAST: ICD-10-PCS | Performed by: RADIOLOGY

## 2017-01-01 PROCEDURE — A9270 NON-COVERED ITEM OR SERVICE: HCPCS | Performed by: FAMILY MEDICINE

## 2017-01-01 PROCEDURE — 160009 HCHG ANES TIME/MIN: Performed by: INTERNAL MEDICINE

## 2017-01-01 PROCEDURE — 94760 N-INVAS EAR/PLS OXIMETRY 1: CPT

## 2017-01-01 PROCEDURE — G8419 CALC BMI OUT NRM PARAM NOF/U: HCPCS | Performed by: FAMILY MEDICINE

## 2017-01-01 PROCEDURE — G0009 ADMIN PNEUMOCOCCAL VACCINE: HCPCS | Performed by: FAMILY MEDICINE

## 2017-01-01 PROCEDURE — 87040 BLOOD CULTURE FOR BACTERIA: CPT | Mod: 91

## 2017-01-01 PROCEDURE — 700117 HCHG RX CONTRAST REV CODE 255: Performed by: EMERGENCY MEDICINE

## 2017-01-01 PROCEDURE — 99233 SBSQ HOSP IP/OBS HIGH 50: CPT | Performed by: INTERNAL MEDICINE

## 2017-01-01 PROCEDURE — T2045 HOSPICE GENERAL CARE: HCPCS

## 2017-01-01 PROCEDURE — 700102 HCHG RX REV CODE 250 W/ 637 OVERRIDE(OP): Performed by: FAMILY MEDICINE

## 2017-01-01 PROCEDURE — 160208 HCHG ENDO MINUTES - EA ADDL 1 MIN LEVEL 4: Performed by: INTERNAL MEDICINE

## 2017-01-01 PROCEDURE — 4040F PNEUMOC VAC/ADMIN/RCVD: CPT | Performed by: FAMILY MEDICINE

## 2017-01-01 PROCEDURE — 85610 PROTHROMBIN TIME: CPT

## 2017-01-01 PROCEDURE — G8599 NO ASA/ANTIPLAT THER USE RNG: HCPCS | Performed by: FAMILY MEDICINE

## 2017-01-01 PROCEDURE — 93306 TTE W/DOPPLER COMPLETE: CPT | Mod: 26 | Performed by: INTERNAL MEDICINE

## 2017-01-01 PROCEDURE — 82378 CARCINOEMBRYONIC ANTIGEN: CPT

## 2017-01-01 PROCEDURE — 1036F TOBACCO NON-USER: CPT | Performed by: FAMILY MEDICINE

## 2017-01-01 PROCEDURE — 3017F COLORECTAL CA SCREEN DOC REV: CPT | Mod: 1P | Performed by: FAMILY MEDICINE

## 2017-01-01 PROCEDURE — 84100 ASSAY OF PHOSPHORUS: CPT

## 2017-01-01 PROCEDURE — G0299 HHS/HOSPICE OF RN EA 15 MIN: HCPCS

## 2017-01-01 PROCEDURE — G8484 FLU IMMUNIZE NO ADMIN: HCPCS | Performed by: FAMILY MEDICINE

## 2017-01-01 PROCEDURE — 71260 CT THORAX DX C+: CPT

## 2017-01-01 PROCEDURE — 86901 BLOOD TYPING SEROLOGIC RH(D): CPT | Mod: 91

## 2017-01-01 PROCEDURE — 85027 COMPLETE CBC AUTOMATED: CPT

## 2017-01-01 PROCEDURE — 665036 HSPC NOTICE OF ELECTION NOE

## 2017-01-01 PROCEDURE — 87493 C DIFF AMPLIFIED PROBE: CPT

## 2017-01-01 PROCEDURE — 85007 BL SMEAR W/DIFF WBC COUNT: CPT

## 2017-01-01 PROCEDURE — 99285 EMERGENCY DEPT VISIT HI MDM: CPT

## 2017-01-01 PROCEDURE — 700111 HCHG RX REV CODE 636 W/ 250 OVERRIDE (IP): Performed by: HOSPITALIST

## 2017-01-01 PROCEDURE — 99214 OFFICE O/P EST MOD 30 MIN: CPT | Mod: 25 | Performed by: FAMILY MEDICINE

## 2017-01-01 PROCEDURE — 87899 AGENT NOS ASSAY W/OPTIC: CPT

## 2017-01-01 PROCEDURE — 99239 HOSP IP/OBS DSCHRG MGMT >30: CPT | Performed by: HOSPITALIST

## 2017-01-01 PROCEDURE — 700111 HCHG RX REV CODE 636 W/ 250 OVERRIDE (IP): Performed by: EMERGENCY MEDICINE

## 2017-01-01 PROCEDURE — 88172 CYTP DX EVAL FNA 1ST EA SITE: CPT

## 2017-01-01 PROCEDURE — 700105 HCHG RX REV CODE 258

## 2017-01-01 PROCEDURE — 700105 HCHG RX REV CODE 258: Performed by: INTERNAL MEDICINE

## 2017-01-01 PROCEDURE — 87205 SMEAR GRAM STAIN: CPT

## 2017-01-01 PROCEDURE — 76705 ECHO EXAM OF ABDOMEN: CPT

## 2017-01-01 PROCEDURE — 88307 TISSUE EXAM BY PATHOLOGIST: CPT

## 2017-01-01 PROCEDURE — 700117 HCHG RX CONTRAST REV CODE 255

## 2017-01-01 PROCEDURE — 86900 BLOOD TYPING SEROLOGIC ABO: CPT

## 2017-01-01 PROCEDURE — 30253K1: ICD-10-PCS | Performed by: INTERNAL MEDICINE

## 2017-01-01 PROCEDURE — 160035 HCHG PACU - 1ST 60 MINS PHASE I: Performed by: INTERNAL MEDICINE

## 2017-01-01 PROCEDURE — 1101F PT FALLS ASSESS-DOCD LE1/YR: CPT | Performed by: FAMILY MEDICINE

## 2017-01-01 PROCEDURE — 83735 ASSAY OF MAGNESIUM: CPT

## 2017-01-01 PROCEDURE — 99497 ADVNCD CARE PLAN 30 MIN: CPT | Performed by: INTERNAL MEDICINE

## 2017-01-01 PROCEDURE — 93306 TTE W/DOPPLER COMPLETE: CPT

## 2017-01-01 PROCEDURE — 160036 HCHG PACU - EA ADDL 30 MINS PHASE I: Performed by: INTERNAL MEDICINE

## 2017-01-01 PROCEDURE — 0F9530Z DRAINAGE OF RIGHT HEPATIC DUCT WITH DRAINAGE DEVICE, PERCUTANEOUS APPROACH: ICD-10-PCS | Performed by: RADIOLOGY

## 2017-01-01 PROCEDURE — 87070 CULTURE OTHR SPECIMN AEROBIC: CPT

## 2017-01-01 PROCEDURE — 700105 HCHG RX REV CODE 258: Performed by: STUDENT IN AN ORGANIZED HEALTH CARE EDUCATION/TRAINING PROGRAM

## 2017-01-01 PROCEDURE — 99212 OFFICE O/P EST SF 10 MIN: CPT | Performed by: FAMILY MEDICINE

## 2017-01-01 PROCEDURE — 96374 THER/PROPH/DIAG INJ IV PUSH: CPT

## 2017-01-01 PROCEDURE — P9017 PLASMA 1 DONOR FRZ W/IN 8 HR: HCPCS | Mod: 91

## 2017-01-01 PROCEDURE — 160002 HCHG RECOVERY MINUTES (STAT): Performed by: INTERNAL MEDICINE

## 2017-01-01 PROCEDURE — 700101 HCHG RX REV CODE 250

## 2017-01-01 PROCEDURE — 88173 CYTOPATH EVAL FNA REPORT: CPT | Mod: 91

## 2017-01-01 PROCEDURE — 99213 OFFICE O/P EST LOW 20 MIN: CPT | Performed by: INTERNAL MEDICINE

## 2017-01-01 PROCEDURE — 90670 PCV13 VACCINE IM: CPT | Performed by: FAMILY MEDICINE

## 2017-01-01 PROCEDURE — 87522 HEPATITIS C REVRS TRNSCRPJ: CPT

## 2017-01-01 PROCEDURE — G0155 HHCP-SVS OF CSW,EA 15 MIN: HCPCS

## 2017-01-01 PROCEDURE — 96376 TX/PRO/DX INJ SAME DRUG ADON: CPT

## 2017-01-01 PROCEDURE — 83690 ASSAY OF LIPASE: CPT

## 2017-01-01 PROCEDURE — 90471 IMMUNIZATION ADMIN: CPT | Performed by: FAMILY MEDICINE

## 2017-01-01 PROCEDURE — 99153 MOD SED SAME PHYS/QHP EA: CPT

## 2017-01-01 PROCEDURE — 86301 IMMUNOASSAY TUMOR CA 19-9: CPT

## 2017-01-01 PROCEDURE — 93005 ELECTROCARDIOGRAM TRACING: CPT

## 2017-01-01 PROCEDURE — 1101F PT FALLS ASSESS-DOCD LE1/YR: CPT | Mod: 8P | Performed by: FAMILY MEDICINE

## 2017-01-01 PROCEDURE — 76775 US EXAM ABDO BACK WALL LIM: CPT

## 2017-01-01 PROCEDURE — 99233 SBSQ HOSP IP/OBS HIGH 50: CPT | Performed by: HOSPITALIST

## 2017-01-01 PROCEDURE — 87075 CULTR BACTERIA EXCEPT BLOOD: CPT

## 2017-01-01 PROCEDURE — 160048 HCHG OR STATISTICAL LEVEL 1-5: Performed by: INTERNAL MEDICINE

## 2017-01-01 PROCEDURE — 302128 INFUSION PUMP: Performed by: INTERNAL MEDICINE

## 2017-01-01 PROCEDURE — 160203 HCHG ENDO MINUTES - 1ST 30 MINS LEVEL 4: Performed by: INTERNAL MEDICINE

## 2017-01-01 PROCEDURE — 93005 ELECTROCARDIOGRAM TRACING: CPT | Performed by: EMERGENCY MEDICINE

## 2017-01-01 PROCEDURE — 86850 RBC ANTIBODY SCREEN: CPT

## 2017-01-01 PROCEDURE — 99203 OFFICE O/P NEW LOW 30 MIN: CPT | Performed by: INTERNAL MEDICINE

## 2017-01-01 PROCEDURE — P9017 PLASMA 1 DONOR FRZ W/IN 8 HR: HCPCS

## 2017-01-01 PROCEDURE — 500066 HCHG BITE BLOCK, ECT: Performed by: INTERNAL MEDICINE

## 2017-01-01 PROCEDURE — 302129 PCA PLUS: Performed by: INTERNAL MEDICINE

## 2017-01-01 PROCEDURE — 87046 STOOL CULTR AEROBIC BACT EA: CPT

## 2017-01-01 RX ORDER — LORAZEPAM 2 MG/ML
2 INJECTION INTRAMUSCULAR
Status: DISCONTINUED | OUTPATIENT
Start: 2017-01-01 | End: 2017-01-01

## 2017-01-01 RX ORDER — HYDROMORPHONE HYDROCHLORIDE 2 MG/ML
1 INJECTION, SOLUTION INTRAMUSCULAR; INTRAVENOUS; SUBCUTANEOUS
Status: DISCONTINUED | OUTPATIENT
Start: 2017-01-01 | End: 2017-01-01 | Stop reason: HOSPADM

## 2017-01-01 RX ORDER — MORPHINE SULFATE 100 MG/5ML
10 SOLUTION ORAL
Status: DISCONTINUED | OUTPATIENT
Start: 2017-01-01 | End: 2017-01-01 | Stop reason: HOSPADM

## 2017-01-01 RX ORDER — ONDANSETRON 4 MG/1
4 TABLET, ORALLY DISINTEGRATING ORAL EVERY 4 HOURS PRN
Status: ON HOLD | COMMUNITY
End: 2017-01-01

## 2017-01-01 RX ORDER — HALOPERIDOL 5 MG/ML
.5-2 INJECTION INTRAMUSCULAR EVERY 6 HOURS PRN
Status: DISCONTINUED | OUTPATIENT
Start: 2017-01-01 | End: 2017-01-01 | Stop reason: HOSPADM

## 2017-01-01 RX ORDER — METHADONE HYDROCHLORIDE 10 MG/1
20 TABLET ORAL DAILY
Status: DISCONTINUED | OUTPATIENT
Start: 2017-01-01 | End: 2017-01-01 | Stop reason: HOSPADM

## 2017-01-01 RX ORDER — MORPHINE SULFATE 10 MG/ML
10 INJECTION, SOLUTION INTRAMUSCULAR; INTRAVENOUS
Status: DISCONTINUED | OUTPATIENT
Start: 2017-01-01 | End: 2017-01-01 | Stop reason: HOSPADM

## 2017-01-01 RX ORDER — METHADONE HYDROCHLORIDE 10 MG/ML
100 CONCENTRATE ORAL DAILY
Status: ON HOLD | COMMUNITY
End: 2017-01-01

## 2017-01-01 RX ORDER — DIPHENOXYLATE HYDROCHLORIDE AND ATROPINE SULFATE 2.5; .025 MG/1; MG/1
TABLET ORAL
Qty: 30 TAB | Refills: 0 | Status: ON HOLD | OUTPATIENT
Start: 2017-01-01 | End: 2017-01-01

## 2017-01-01 RX ORDER — OXYCODONE HYDROCHLORIDE 10 MG/1
10 TABLET ORAL
Status: DISCONTINUED | OUTPATIENT
Start: 2017-01-01 | End: 2017-01-01

## 2017-01-01 RX ORDER — IPRATROPIUM BROMIDE AND ALBUTEROL SULFATE 2.5; .5 MG/3ML; MG/3ML
3 SOLUTION RESPIRATORY (INHALATION)
Status: DISCONTINUED | OUTPATIENT
Start: 2017-01-01 | End: 2017-01-01 | Stop reason: HOSPADM

## 2017-01-01 RX ORDER — PHYTONADIONE 10 MG/ML
10 INJECTION, EMULSION INTRAMUSCULAR; INTRAVENOUS; SUBCUTANEOUS
Status: ON HOLD | COMMUNITY
End: 2017-01-01

## 2017-01-01 RX ORDER — AMOXICILLIN 250 MG
2 CAPSULE ORAL 2 TIMES DAILY
Status: DISCONTINUED | OUTPATIENT
Start: 2017-01-01 | End: 2017-01-01

## 2017-01-01 RX ORDER — GABAPENTIN 300 MG/1
300 CAPSULE ORAL 3 TIMES DAILY
Status: DISCONTINUED | OUTPATIENT
Start: 2017-01-01 | End: 2017-01-01 | Stop reason: HOSPADM

## 2017-01-01 RX ORDER — ONDANSETRON 2 MG/ML
4 INJECTION INTRAMUSCULAR; INTRAVENOUS EVERY 4 HOURS PRN
Status: DISCONTINUED | OUTPATIENT
Start: 2017-01-01 | End: 2017-01-01 | Stop reason: HOSPADM

## 2017-01-01 RX ORDER — OXYCODONE HYDROCHLORIDE 5 MG/1
5 TABLET ORAL
Status: DISCONTINUED | OUTPATIENT
Start: 2017-01-01 | End: 2017-01-01 | Stop reason: HOSPADM

## 2017-01-01 RX ORDER — HYDROMORPHONE HYDROCHLORIDE 4 MG/1
4 TABLET ORAL
Status: DISCONTINUED | OUTPATIENT
Start: 2017-01-01 | End: 2017-01-01

## 2017-01-01 RX ORDER — AMOXICILLIN 250 MG
1 CAPSULE ORAL NIGHTLY
Status: DISCONTINUED | OUTPATIENT
Start: 2017-01-01 | End: 2017-01-01 | Stop reason: HOSPADM

## 2017-01-01 RX ORDER — POLYETHYLENE GLYCOL 3350 17 G/17G
1 POWDER, FOR SOLUTION ORAL
Status: DISCONTINUED | OUTPATIENT
Start: 2017-01-01 | End: 2017-01-01 | Stop reason: HOSPADM

## 2017-01-01 RX ORDER — AMOXICILLIN 250 MG
2 CAPSULE ORAL 2 TIMES DAILY
Status: DISCONTINUED | OUTPATIENT
Start: 2017-01-01 | End: 2017-01-01 | Stop reason: HOSPADM

## 2017-01-01 RX ORDER — METHADONE HYDROCHLORIDE 10 MG/1
20 TABLET ORAL DAILY
Status: DISCONTINUED | OUTPATIENT
Start: 2017-01-01 | End: 2017-01-01

## 2017-01-01 RX ORDER — TIOTROPIUM BROMIDE 18 UG/1
18 CAPSULE ORAL; RESPIRATORY (INHALATION) DAILY
Status: ON HOLD | COMMUNITY
End: 2017-01-01

## 2017-01-01 RX ORDER — OXYCODONE HYDROCHLORIDE 10 MG/1
10 TABLET ORAL
Status: ON HOLD | COMMUNITY
End: 2017-01-01

## 2017-01-01 RX ORDER — PHYTONADIONE 10 MG/ML
10 INJECTION, EMULSION INTRAMUSCULAR; INTRAVENOUS; SUBCUTANEOUS DAILY
Status: DISCONTINUED | OUTPATIENT
Start: 2017-01-01 | End: 2017-01-01 | Stop reason: HOSPADM

## 2017-01-01 RX ORDER — METHADONE HYDROCHLORIDE 10 MG/ML
5 CONCENTRATE ORAL
Status: ON HOLD | COMMUNITY
End: 2017-01-01

## 2017-01-01 RX ORDER — OXYCODONE HYDROCHLORIDE 5 MG/1
5 TABLET ORAL
Status: DISCONTINUED | OUTPATIENT
Start: 2017-01-01 | End: 2017-01-01

## 2017-01-01 RX ORDER — NICOTINE 21 MG/24HR
21 PATCH, TRANSDERMAL 24 HOURS TRANSDERMAL
Status: DISCONTINUED | OUTPATIENT
Start: 2017-01-01 | End: 2017-01-01 | Stop reason: HOSPADM

## 2017-01-01 RX ORDER — ENEMA 19; 7 G/133ML; G/133ML
1 ENEMA RECTAL
Status: DISCONTINUED | OUTPATIENT
Start: 2017-01-01 | End: 2017-01-01 | Stop reason: HOSPADM

## 2017-01-01 RX ORDER — ONDANSETRON 2 MG/ML
4 INJECTION INTRAMUSCULAR; INTRAVENOUS PRN
Status: ACTIVE | OUTPATIENT
Start: 2017-01-01 | End: 2017-01-01

## 2017-01-01 RX ORDER — METHADONE HYDROCHLORIDE 40 MG/1
80 TABLET ORAL DAILY
Status: CANCELLED | OUTPATIENT
Start: 2017-01-01

## 2017-01-01 RX ORDER — METHADONE HYDROCHLORIDE 10 MG/1
60 TABLET ORAL 2 TIMES DAILY
Status: DISCONTINUED | OUTPATIENT
Start: 2017-01-01 | End: 2017-01-01 | Stop reason: HOSPADM

## 2017-01-01 RX ORDER — TIOTROPIUM BROMIDE 18 UG/1
1 CAPSULE ORAL; RESPIRATORY (INHALATION) DAILY
Status: DISCONTINUED | OUTPATIENT
Start: 2017-01-01 | End: 2017-01-01 | Stop reason: HOSPADM

## 2017-01-01 RX ORDER — HYDROMORPHONE HYDROCHLORIDE 2 MG/ML
1 INJECTION, SOLUTION INTRAMUSCULAR; INTRAVENOUS; SUBCUTANEOUS ONCE
Status: COMPLETED | OUTPATIENT
Start: 2017-01-01 | End: 2017-01-01

## 2017-01-01 RX ORDER — HYDROMORPHONE HYDROCHLORIDE 2 MG/ML
0.5 INJECTION, SOLUTION INTRAMUSCULAR; INTRAVENOUS; SUBCUTANEOUS
Status: DISCONTINUED | OUTPATIENT
Start: 2017-01-01 | End: 2017-01-01

## 2017-01-01 RX ORDER — POLYETHYLENE GLYCOL 3350 17 G/17G
1 POWDER, FOR SOLUTION ORAL
Status: CANCELLED | OUTPATIENT
Start: 2017-01-01

## 2017-01-01 RX ORDER — BISACODYL 10 MG
10 SUPPOSITORY, RECTAL RECTAL
Status: DISCONTINUED | OUTPATIENT
Start: 2017-01-01 | End: 2017-01-01 | Stop reason: HOSPADM

## 2017-01-01 RX ORDER — NICOTINE 21 MG/24HR
1 PATCH, TRANSDERMAL 24 HOURS TRANSDERMAL EVERY 24 HOURS
Status: ON HOLD | COMMUNITY
End: 2017-01-01

## 2017-01-01 RX ORDER — HALOPERIDOL 2 MG/ML
2 SOLUTION ORAL EVERY 4 HOURS
Status: DISCONTINUED | OUTPATIENT
Start: 2017-01-01 | End: 2017-01-01

## 2017-01-01 RX ORDER — ONDANSETRON 2 MG/ML
8 INJECTION INTRAMUSCULAR; INTRAVENOUS EVERY 8 HOURS PRN
Status: DISCONTINUED | OUTPATIENT
Start: 2017-01-01 | End: 2017-01-01 | Stop reason: HOSPADM

## 2017-01-01 RX ORDER — HALOPERIDOL 5 MG/ML
2 INJECTION INTRAMUSCULAR EVERY 4 HOURS
Status: DISCONTINUED | OUTPATIENT
Start: 2017-01-01 | End: 2017-01-01

## 2017-01-01 RX ORDER — NICOTINE 21 MG/24HR
1 PATCH, TRANSDERMAL 24 HOURS TRANSDERMAL EVERY 24 HOURS
Status: DISCONTINUED | OUTPATIENT
Start: 2017-01-01 | End: 2017-01-01

## 2017-01-01 RX ORDER — ONDANSETRON 4 MG/1
4 TABLET, ORALLY DISINTEGRATING ORAL EVERY 6 HOURS PRN
Status: DISCONTINUED | OUTPATIENT
Start: 2017-01-01 | End: 2017-01-01 | Stop reason: HOSPADM

## 2017-01-01 RX ORDER — BISACODYL 10 MG
10 SUPPOSITORY, RECTAL RECTAL PRN
Status: ON HOLD | COMMUNITY
End: 2017-01-01

## 2017-01-01 RX ORDER — ONDANSETRON 4 MG/1
4 TABLET, ORALLY DISINTEGRATING ORAL EVERY 4 HOURS PRN
Status: DISCONTINUED | OUTPATIENT
Start: 2017-01-01 | End: 2017-01-01 | Stop reason: HOSPADM

## 2017-01-01 RX ORDER — ERGOCALCIFEROL 1.25 MG/1
CAPSULE ORAL
Qty: 3 CAP | Refills: 3 | Status: ON HOLD | OUTPATIENT
Start: 2017-01-01 | End: 2017-01-01

## 2017-01-01 RX ORDER — LORAZEPAM 2 MG/ML
2 CONCENTRATE ORAL EVERY 4 HOURS
Status: DISCONTINUED | OUTPATIENT
Start: 2017-01-01 | End: 2017-01-01

## 2017-01-01 RX ORDER — HYDROMORPHONE HYDROCHLORIDE 4 MG/1
8 TABLET ORAL
Status: DISCONTINUED | OUTPATIENT
Start: 2017-01-01 | End: 2017-01-01 | Stop reason: HOSPADM

## 2017-01-01 RX ORDER — POLYETHYLENE GLYCOL 3350 17 G/17G
1 POWDER, FOR SOLUTION ORAL
Status: DISCONTINUED | OUTPATIENT
Start: 2017-01-01 | End: 2017-01-01

## 2017-01-01 RX ORDER — MORPHINE SULFATE 10 MG/ML
5 INJECTION, SOLUTION INTRAMUSCULAR; INTRAVENOUS
Status: DISCONTINUED | OUTPATIENT
Start: 2017-01-01 | End: 2017-01-01 | Stop reason: HOSPADM

## 2017-01-01 RX ORDER — OXYCODONE HYDROCHLORIDE 10 MG/1
10 TABLET ORAL EVERY 4 HOURS PRN
Status: DISCONTINUED | OUTPATIENT
Start: 2017-01-01 | End: 2017-01-01 | Stop reason: HOSPADM

## 2017-01-01 RX ORDER — METHADONE HYDROCHLORIDE 10 MG/ML
20 CONCENTRATE ORAL ONCE
Status: DISCONTINUED | OUTPATIENT
Start: 2017-01-01 | End: 2017-01-01

## 2017-01-01 RX ORDER — ONDANSETRON 2 MG/ML
4 INJECTION INTRAMUSCULAR; INTRAVENOUS EVERY 6 HOURS PRN
Status: DISCONTINUED | OUTPATIENT
Start: 2017-01-01 | End: 2017-01-01 | Stop reason: HOSPADM

## 2017-01-01 RX ORDER — GABAPENTIN 300 MG/1
600 CAPSULE ORAL 3 TIMES DAILY
Status: DISCONTINUED | OUTPATIENT
Start: 2017-01-01 | End: 2017-01-01

## 2017-01-01 RX ORDER — METHADONE HYDROCHLORIDE 5 MG/5ML
100 SOLUTION ORAL DAILY
Status: DISCONTINUED | OUTPATIENT
Start: 2017-01-01 | End: 2017-01-01

## 2017-01-01 RX ORDER — SODIUM CHLORIDE, SODIUM LACTATE, POTASSIUM CHLORIDE, CALCIUM CHLORIDE 600; 310; 30; 20 MG/100ML; MG/100ML; MG/100ML; MG/100ML
INJECTION, SOLUTION INTRAVENOUS CONTINUOUS
Status: DISCONTINUED | OUTPATIENT
Start: 2017-01-01 | End: 2017-01-01 | Stop reason: HOSPADM

## 2017-01-01 RX ORDER — BISACODYL 10 MG
10 SUPPOSITORY, RECTAL RECTAL DAILY
Status: DISCONTINUED | OUTPATIENT
Start: 2017-01-01 | End: 2017-01-01

## 2017-01-01 RX ORDER — METHADONE HYDROCHLORIDE 10 MG/ML
60 CONCENTRATE ORAL ONCE
Status: COMPLETED | OUTPATIENT
Start: 2017-01-01 | End: 2017-01-01

## 2017-01-01 RX ORDER — LORAZEPAM 2 MG/ML
2 INJECTION INTRAMUSCULAR ONCE
Status: COMPLETED | OUTPATIENT
Start: 2017-01-01 | End: 2017-01-01

## 2017-01-01 RX ORDER — HYDROMORPHONE HYDROCHLORIDE 2 MG/ML
.5-1 INJECTION, SOLUTION INTRAMUSCULAR; INTRAVENOUS; SUBCUTANEOUS
Status: DISCONTINUED | OUTPATIENT
Start: 2017-01-01 | End: 2017-01-01

## 2017-01-01 RX ORDER — LORAZEPAM 2 MG/ML
1-2 INJECTION INTRAMUSCULAR
Status: DISCONTINUED | OUTPATIENT
Start: 2017-01-01 | End: 2017-01-01

## 2017-01-01 RX ORDER — LORAZEPAM 2 MG/ML
4 INJECTION INTRAMUSCULAR
Status: DISCONTINUED | OUTPATIENT
Start: 2017-01-01 | End: 2017-01-01

## 2017-01-01 RX ORDER — GABAPENTIN 300 MG/1
300 CAPSULE ORAL 3 TIMES DAILY
Status: ON HOLD | COMMUNITY
End: 2017-01-01

## 2017-01-01 RX ORDER — HYDROMORPHONE HYDROCHLORIDE 2 MG/ML
2 INJECTION, SOLUTION INTRAMUSCULAR; INTRAVENOUS; SUBCUTANEOUS
Status: DISCONTINUED | OUTPATIENT
Start: 2017-01-01 | End: 2017-01-01 | Stop reason: HOSPADM

## 2017-01-01 RX ORDER — METHADONE HYDROCHLORIDE 40 MG/1
80 TABLET ORAL DAILY
Status: DISCONTINUED | OUTPATIENT
Start: 2017-01-01 | End: 2017-01-01 | Stop reason: HOSPADM

## 2017-01-01 RX ORDER — LACTULOSE 20 G/30ML
30 SOLUTION ORAL
Status: DISCONTINUED | OUTPATIENT
Start: 2017-01-01 | End: 2017-01-01 | Stop reason: HOSPADM

## 2017-01-01 RX ORDER — DIPHENOXYLATE HYDROCHLORIDE AND ATROPINE SULFATE 2.5; .025 MG/1; MG/1
1 TABLET ORAL 4 TIMES DAILY PRN
Qty: 30 TAB | Refills: 0 | Status: SHIPPED | OUTPATIENT
Start: 2017-01-01 | End: 2017-01-01 | Stop reason: SDUPTHER

## 2017-01-01 RX ORDER — MIDAZOLAM HYDROCHLORIDE 1 MG/ML
INJECTION INTRAMUSCULAR; INTRAVENOUS
Status: COMPLETED
Start: 2017-01-01 | End: 2017-01-01

## 2017-01-01 RX ORDER — POLYETHYLENE GLYCOL 3350 17 G/17G
17 POWDER, FOR SOLUTION ORAL PRN
Status: ON HOLD | COMMUNITY
End: 2017-01-01

## 2017-01-01 RX ORDER — HYDROMORPHONE HYDROCHLORIDE 2 MG/ML
.5-1 INJECTION, SOLUTION INTRAMUSCULAR; INTRAVENOUS; SUBCUTANEOUS
Status: DISCONTINUED | OUTPATIENT
Start: 2017-01-01 | End: 2017-01-01 | Stop reason: HOSPADM

## 2017-01-01 RX ORDER — CHLORPROMAZINE HYDROCHLORIDE 25 MG/ML
100 INJECTION INTRAMUSCULAR EVERY 8 HOURS
Status: DISCONTINUED | OUTPATIENT
Start: 2017-01-01 | End: 2017-01-01 | Stop reason: HOSPADM

## 2017-01-01 RX ORDER — ACETAMINOPHEN 325 MG/1
650 TABLET ORAL EVERY 6 HOURS PRN
Status: DISCONTINUED | OUTPATIENT
Start: 2017-01-01 | End: 2017-01-01 | Stop reason: HOSPADM

## 2017-01-01 RX ORDER — SODIUM CHLORIDE 9 MG/ML
INJECTION, SOLUTION INTRAVENOUS CONTINUOUS
Status: DISCONTINUED | OUTPATIENT
Start: 2017-01-01 | End: 2017-01-01

## 2017-01-01 RX ORDER — OXYCODONE HYDROCHLORIDE 5 MG/1
5 TABLET ORAL
Status: CANCELLED | OUTPATIENT
Start: 2017-01-01

## 2017-01-01 RX ORDER — ATROPINE SULFATE 10 MG/ML
2 SOLUTION/ DROPS OPHTHALMIC
Status: DISCONTINUED | OUTPATIENT
Start: 2017-01-01 | End: 2017-01-01 | Stop reason: HOSPADM

## 2017-01-01 RX ORDER — CIPROFLOXACIN 500 MG/1
500 TABLET, FILM COATED ORAL 2 TIMES DAILY
Qty: 14 TAB | Refills: 1 | Status: SHIPPED | OUTPATIENT
Start: 2017-01-01 | End: 2017-01-01

## 2017-01-01 RX ORDER — AMOXICILLIN 250 MG
2 CAPSULE ORAL 2 TIMES DAILY
Status: CANCELLED | OUTPATIENT
Start: 2017-01-01

## 2017-01-01 RX ORDER — TIOTROPIUM BROMIDE 18 UG/1
1 CAPSULE ORAL; RESPIRATORY (INHALATION) DAILY
Status: CANCELLED | OUTPATIENT
Start: 2017-01-01

## 2017-01-01 RX ORDER — ONDANSETRON 2 MG/ML
4 INJECTION INTRAMUSCULAR; INTRAVENOUS EVERY 4 HOURS PRN
Status: ON HOLD | COMMUNITY
End: 2017-01-01

## 2017-01-01 RX ORDER — SODIUM CHLORIDE 9 MG/ML
INJECTION, SOLUTION INTRAVENOUS CONTINUOUS
Status: DISCONTINUED | OUTPATIENT
Start: 2017-01-01 | End: 2017-01-01 | Stop reason: HOSPADM

## 2017-01-01 RX ORDER — ALPRAZOLAM 0.5 MG/1
0.5 TABLET ORAL 2 TIMES DAILY PRN
Status: DISCONTINUED | OUTPATIENT
Start: 2017-01-01 | End: 2017-01-01

## 2017-01-01 RX ORDER — METHADONE HYDROCHLORIDE 10 MG/ML
100 CONCENTRATE ORAL DAILY
Status: DISCONTINUED | OUTPATIENT
Start: 2017-01-01 | End: 2017-01-01

## 2017-01-01 RX ORDER — PHYTONADIONE 10 MG/ML
INJECTION, EMULSION INTRAMUSCULAR; INTRAVENOUS; SUBCUTANEOUS DAILY
Status: ON HOLD | COMMUNITY
End: 2017-01-01

## 2017-01-01 RX ORDER — ONDANSETRON 2 MG/ML
4 INJECTION INTRAMUSCULAR; INTRAVENOUS EVERY 4 HOURS PRN
Status: CANCELLED | OUTPATIENT
Start: 2017-01-01

## 2017-01-01 RX ORDER — AMOXICILLIN 250 MG
1 CAPSULE ORAL
Status: DISCONTINUED | OUTPATIENT
Start: 2017-01-01 | End: 2017-01-01 | Stop reason: HOSPADM

## 2017-01-01 RX ORDER — ALBUTEROL SULFATE 90 UG/1
2 AEROSOL, METERED RESPIRATORY (INHALATION) EVERY 4 HOURS PRN
Qty: 8.5 INHALER | Refills: 3 | Status: SHIPPED | OUTPATIENT
Start: 2017-01-01 | End: 2017-01-01 | Stop reason: SDUPTHER

## 2017-01-01 RX ORDER — ACETAMINOPHEN 325 MG/1
650 TABLET ORAL EVERY 6 HOURS PRN
Status: CANCELLED | OUTPATIENT
Start: 2017-01-01

## 2017-01-01 RX ORDER — PHYTONADIONE 10 MG/ML
10 INJECTION, EMULSION INTRAMUSCULAR; INTRAVENOUS; SUBCUTANEOUS DAILY
Status: CANCELLED | OUTPATIENT
Start: 2017-01-01 | End: 2017-01-01

## 2017-01-01 RX ORDER — BISACODYL 10 MG
10 SUPPOSITORY, RECTAL RECTAL
Status: DISCONTINUED | OUTPATIENT
Start: 2017-01-01 | End: 2017-01-01

## 2017-01-01 RX ORDER — IPRATROPIUM BROMIDE AND ALBUTEROL SULFATE 2.5; .5 MG/3ML; MG/3ML
3 SOLUTION RESPIRATORY (INHALATION) EVERY 4 HOURS PRN
Status: ON HOLD | COMMUNITY
End: 2017-01-01

## 2017-01-01 RX ORDER — LORAZEPAM 2 MG/ML
2-4 INJECTION INTRAMUSCULAR
Status: DISCONTINUED | OUTPATIENT
Start: 2017-01-01 | End: 2017-01-01

## 2017-01-01 RX ORDER — LORAZEPAM 2 MG/ML
1 INJECTION INTRAMUSCULAR
Status: DISCONTINUED | OUTPATIENT
Start: 2017-01-01 | End: 2017-01-01 | Stop reason: HOSPADM

## 2017-01-01 RX ORDER — ACETAMINOPHEN 325 MG/1
650 TABLET ORAL EVERY 6 HOURS PRN
Status: ON HOLD | COMMUNITY
End: 2017-01-01

## 2017-01-01 RX ORDER — METHADONE HYDROCHLORIDE 10 MG/ML
60 CONCENTRATE ORAL EVERY 8 HOURS
Status: DISCONTINUED | OUTPATIENT
Start: 2017-01-01 | End: 2017-01-01 | Stop reason: HOSPADM

## 2017-01-01 RX ORDER — OXYCODONE HYDROCHLORIDE 10 MG/1
10 TABLET ORAL
Status: DISCONTINUED | OUTPATIENT
Start: 2017-01-01 | End: 2017-01-01 | Stop reason: HOSPADM

## 2017-01-01 RX ORDER — HALOPERIDOL 5 MG/ML
1 INJECTION INTRAMUSCULAR 2 TIMES DAILY
Status: DISCONTINUED | OUTPATIENT
Start: 2017-01-01 | End: 2017-01-01

## 2017-01-01 RX ORDER — METHADONE HYDROCHLORIDE 10 MG/ML
50 CONCENTRATE ORAL ONCE
Status: DISPENSED | OUTPATIENT
Start: 2017-01-01 | End: 2017-01-01

## 2017-01-01 RX ORDER — HYDROMORPHONE HYDROCHLORIDE 2 MG/ML
.5-2.5 INJECTION, SOLUTION INTRAMUSCULAR; INTRAVENOUS; SUBCUTANEOUS
Status: DISCONTINUED | OUTPATIENT
Start: 2017-01-01 | End: 2017-01-01

## 2017-01-01 RX ORDER — GABAPENTIN 300 MG/1
300 CAPSULE ORAL 3 TIMES DAILY
Status: CANCELLED | OUTPATIENT
Start: 2017-01-01

## 2017-01-01 RX ORDER — LORAZEPAM 2 MG/ML
1 CONCENTRATE ORAL
Status: DISCONTINUED | OUTPATIENT
Start: 2017-01-01 | End: 2017-01-01 | Stop reason: HOSPADM

## 2017-01-01 RX ORDER — MIDAZOLAM HYDROCHLORIDE 1 MG/ML
.5-2 INJECTION INTRAMUSCULAR; INTRAVENOUS PRN
Status: ACTIVE | OUTPATIENT
Start: 2017-01-01 | End: 2017-01-01

## 2017-01-01 RX ORDER — METHADONE HYDROCHLORIDE 40 MG/1
60 TABLET ORAL 2 TIMES DAILY
Status: DISCONTINUED | OUTPATIENT
Start: 2017-01-01 | End: 2017-01-01

## 2017-01-01 RX ORDER — METHADONE HYDROCHLORIDE 10 MG/1
20 TABLET ORAL DAILY
Status: CANCELLED | OUTPATIENT
Start: 2017-01-01

## 2017-01-01 RX ORDER — METHADONE HYDROCHLORIDE 10 MG/ML
60 CONCENTRATE ORAL 2 TIMES DAILY
Status: DISCONTINUED | OUTPATIENT
Start: 2017-01-01 | End: 2017-01-01

## 2017-01-01 RX ORDER — NICOTINE 21 MG/24HR
21 PATCH, TRANSDERMAL 24 HOURS TRANSDERMAL
Status: CANCELLED | OUTPATIENT
Start: 2017-01-01

## 2017-01-01 RX ORDER — BISACODYL 10 MG
10 SUPPOSITORY, RECTAL RECTAL
Status: CANCELLED | OUTPATIENT
Start: 2017-01-01

## 2017-01-01 RX ORDER — HYDROMORPHONE HYDROCHLORIDE 2 MG/ML
INJECTION, SOLUTION INTRAMUSCULAR; INTRAVENOUS; SUBCUTANEOUS
Status: COMPLETED
Start: 2017-01-01 | End: 2017-01-01

## 2017-01-01 RX ORDER — DOCUSATE SODIUM 100 MG/1
100 CAPSULE, LIQUID FILLED ORAL EVERY MORNING
Status: DISCONTINUED | OUTPATIENT
Start: 2017-01-01 | End: 2017-01-01 | Stop reason: HOSPADM

## 2017-01-01 RX ORDER — LORAZEPAM 2 MG/ML
4 CONCENTRATE ORAL EVERY 4 HOURS
Status: DISCONTINUED | OUTPATIENT
Start: 2017-01-01 | End: 2017-01-01 | Stop reason: HOSPADM

## 2017-01-01 RX ORDER — GABAPENTIN 100 MG/1
100 CAPSULE ORAL 3 TIMES DAILY
Qty: 90 CAP | Refills: 3 | Status: ON HOLD | OUTPATIENT
Start: 2017-01-01 | End: 2017-01-01

## 2017-01-01 RX ORDER — TIOTROPIUM BROMIDE 18 UG/1
CAPSULE ORAL; RESPIRATORY (INHALATION)
Qty: 30 CAP | Refills: 3 | Status: ON HOLD | OUTPATIENT
Start: 2017-01-01 | End: 2017-01-01

## 2017-01-01 RX ORDER — METHADONE HYDROCHLORIDE 40 MG/1
80 TABLET ORAL 2 TIMES DAILY
Status: DISCONTINUED | OUTPATIENT
Start: 2017-01-01 | End: 2017-01-01

## 2017-01-01 RX ORDER — IPRATROPIUM BROMIDE AND ALBUTEROL SULFATE 2.5; .5 MG/3ML; MG/3ML
3 SOLUTION RESPIRATORY (INHALATION)
Status: CANCELLED | OUTPATIENT
Start: 2017-01-01

## 2017-01-01 RX ORDER — OXYCODONE HYDROCHLORIDE 5 MG/1
5 TABLET ORAL
Status: ON HOLD | COMMUNITY
End: 2017-01-01

## 2017-01-01 RX ORDER — OXYCODONE HYDROCHLORIDE 10 MG/1
10 TABLET ORAL
Status: CANCELLED | OUTPATIENT
Start: 2017-01-01

## 2017-01-01 RX ORDER — METHADONE HYDROCHLORIDE 10 MG/1
40 TABLET ORAL ONCE
Status: COMPLETED | OUTPATIENT
Start: 2017-01-01 | End: 2017-01-01

## 2017-01-01 RX ORDER — TIOTROPIUM BROMIDE 18 UG/1
1 CAPSULE ORAL; RESPIRATORY (INHALATION)
Status: DISCONTINUED | OUTPATIENT
Start: 2017-01-01 | End: 2017-01-01

## 2017-01-01 RX ORDER — HYDROMORPHONE HYDROCHLORIDE 2 MG/ML
0.25 INJECTION, SOLUTION INTRAMUSCULAR; INTRAVENOUS; SUBCUTANEOUS
Status: DISCONTINUED | OUTPATIENT
Start: 2017-01-01 | End: 2017-01-01

## 2017-01-01 RX ORDER — HYDROMORPHONE HYDROCHLORIDE 2 MG/ML
1 INJECTION, SOLUTION INTRAMUSCULAR; INTRAVENOUS; SUBCUTANEOUS
Status: DISCONTINUED | OUTPATIENT
Start: 2017-01-01 | End: 2017-01-01

## 2017-01-01 RX ORDER — MORPHINE SULFATE 100 MG/5ML
20 SOLUTION ORAL
Status: DISCONTINUED | OUTPATIENT
Start: 2017-01-01 | End: 2017-01-01

## 2017-01-01 RX ORDER — MORPHINE SULFATE 100 MG/5ML
20 SOLUTION ORAL EVERY 4 HOURS PRN
Status: DISCONTINUED | OUTPATIENT
Start: 2017-01-01 | End: 2017-01-01

## 2017-01-01 RX ORDER — OXYCODONE HCL 20 MG/ML
40 CONCENTRATE, ORAL ORAL
Status: DISCONTINUED | OUTPATIENT
Start: 2017-01-01 | End: 2017-01-01 | Stop reason: HOSPADM

## 2017-01-01 RX ORDER — SODIUM CHLORIDE 9 MG/ML
500 INJECTION, SOLUTION INTRAVENOUS
Status: ACTIVE | OUTPATIENT
Start: 2017-01-01 | End: 2017-01-01

## 2017-01-01 RX ORDER — METHADONE HYDROCHLORIDE 10 MG/1
80 TABLET ORAL DAILY
Status: DISCONTINUED | OUTPATIENT
Start: 2017-01-01 | End: 2017-01-01

## 2017-01-01 RX ORDER — HYDROMORPHONE HYDROCHLORIDE 2 MG/ML
.5-1 INJECTION, SOLUTION INTRAMUSCULAR; INTRAVENOUS; SUBCUTANEOUS
Status: CANCELLED | OUTPATIENT
Start: 2017-01-01

## 2017-01-01 RX ORDER — SODIUM CHLORIDE 9 MG/ML
INJECTION, SOLUTION INTRAVENOUS CONTINUOUS
Status: CANCELLED | OUTPATIENT
Start: 2017-01-01

## 2017-01-01 RX ORDER — POLYVINYL ALCOHOL 14 MG/ML
2 SOLUTION/ DROPS OPHTHALMIC PRN
Status: DISCONTINUED | OUTPATIENT
Start: 2017-01-01 | End: 2017-01-01 | Stop reason: HOSPADM

## 2017-01-01 RX ORDER — ONDANSETRON 4 MG/1
4 TABLET, ORALLY DISINTEGRATING ORAL EVERY 4 HOURS PRN
Status: CANCELLED | OUTPATIENT
Start: 2017-01-01

## 2017-01-01 RX ORDER — DOCUSATE SODIUM 100 MG/1
100 CAPSULE, LIQUID FILLED ORAL 2 TIMES DAILY
Status: DISCONTINUED | OUTPATIENT
Start: 2017-01-01 | End: 2017-01-01

## 2017-01-01 RX ORDER — ONDANSETRON 4 MG/1
8 TABLET, ORALLY DISINTEGRATING ORAL EVERY 8 HOURS PRN
Status: DISCONTINUED | OUTPATIENT
Start: 2017-01-01 | End: 2017-01-01 | Stop reason: HOSPADM

## 2017-01-01 RX ORDER — GABAPENTIN 300 MG/1
300 CAPSULE ORAL 3 TIMES DAILY
Status: DISCONTINUED | OUTPATIENT
Start: 2017-01-01 | End: 2017-01-01

## 2017-01-01 RX ORDER — HYDROMORPHONE HYDROCHLORIDE 2 MG/ML
0.5 INJECTION, SOLUTION INTRAMUSCULAR; INTRAVENOUS; SUBCUTANEOUS ONCE
Status: COMPLETED | OUTPATIENT
Start: 2017-01-01 | End: 2017-01-01

## 2017-01-01 RX ORDER — AMOXICILLIN 250 MG
2 CAPSULE ORAL 2 TIMES DAILY
Status: ON HOLD | COMMUNITY
End: 2017-01-01

## 2017-01-01 RX ADMIN — STANDARDIZED SENNA CONCENTRATE AND DOCUSATE SODIUM 1 TABLET: 8.6; 5 TABLET, FILM COATED ORAL at 21:06

## 2017-01-01 RX ADMIN — LORAZEPAM 2 MG: 2 SOLUTION, CONCENTRATE ORAL at 20:28

## 2017-01-01 RX ADMIN — HALOPERIDOL LACTATE 2 MG: 2 SOLUTION, CONCENTRATE ORAL at 05:26

## 2017-01-01 RX ADMIN — FENTANYL CITRATE 50 MCG: 50 INJECTION, SOLUTION INTRAMUSCULAR; INTRAVENOUS at 08:09

## 2017-01-01 RX ADMIN — GABAPENTIN 300 MG: 300 CAPSULE ORAL at 08:17

## 2017-01-01 RX ADMIN — METHADONE HYDROCHLORIDE 20 MG: 10 TABLET ORAL at 09:33

## 2017-01-01 RX ADMIN — BISACODYL 10 MG: 10 SUPPOSITORY RECTAL at 20:16

## 2017-01-01 RX ADMIN — LORAZEPAM 2 MG: 2 INJECTION, SOLUTION INTRAMUSCULAR; INTRAVENOUS at 07:55

## 2017-01-01 RX ADMIN — IOHEXOL 100 ML: 350 INJECTION, SOLUTION INTRAVENOUS at 03:45

## 2017-01-01 RX ADMIN — METHADONE HYDROCHLORIDE 60 MG: 10 CONCENTRATE ORAL at 22:24

## 2017-01-01 RX ADMIN — HYDROMORPHONE HYDROCHLORIDE 1 MG: 2 INJECTION INTRAMUSCULAR; INTRAVENOUS; SUBCUTANEOUS at 03:26

## 2017-01-01 RX ADMIN — LORAZEPAM 2 MG: 2 INJECTION, SOLUTION INTRAMUSCULAR; INTRAVENOUS at 02:16

## 2017-01-01 RX ADMIN — MORPHINE SULFATE 20 MG: 100 SOLUTION ORAL at 07:33

## 2017-01-01 RX ADMIN — METHADONE HYDROCHLORIDE 60 MG: 10 CONCENTRATE ORAL at 11:04

## 2017-01-01 RX ADMIN — METHADONE HYDROCHLORIDE 60 MG: 10 CONCENTRATE ORAL at 21:02

## 2017-01-01 RX ADMIN — OXYCODONE HYDROCHLORIDE 10 MG: 10 TABLET ORAL at 18:12

## 2017-01-01 RX ADMIN — METHADONE HYDROCHLORIDE 20 MG: 10 TABLET ORAL at 09:39

## 2017-01-01 RX ADMIN — HYDROMORPHONE HYDROCHLORIDE 2 MG: 2 INJECTION INTRAMUSCULAR; INTRAVENOUS; SUBCUTANEOUS at 20:07

## 2017-01-01 RX ADMIN — TIOTROPIUM BROMIDE 1 CAPSULE: 18 CAPSULE ORAL; RESPIRATORY (INHALATION) at 09:19

## 2017-01-01 RX ADMIN — METHADONE HYDROCHLORIDE 60 MG: 10 TABLET ORAL at 21:50

## 2017-01-01 RX ADMIN — METHADONE HYDROCHLORIDE 80 MG: 10 TABLET ORAL at 07:39

## 2017-01-01 RX ADMIN — LORAZEPAM 2 MG: 2 SOLUTION, CONCENTRATE ORAL at 05:26

## 2017-01-01 RX ADMIN — GABAPENTIN 600 MG: 300 CAPSULE ORAL at 07:40

## 2017-01-01 RX ADMIN — LORAZEPAM 4 MG: 2 SOLUTION, CONCENTRATE ORAL at 00:12

## 2017-01-01 RX ADMIN — LORAZEPAM 2 MG: 2 INJECTION, SOLUTION INTRAMUSCULAR; INTRAVENOUS at 14:02

## 2017-01-01 RX ADMIN — TIOTROPIUM BROMIDE 1 CAPSULE: 18 CAPSULE ORAL; RESPIRATORY (INHALATION) at 09:58

## 2017-01-01 RX ADMIN — HYDROMORPHONE HYDROCHLORIDE 2 MG: 2 INJECTION INTRAMUSCULAR; INTRAVENOUS; SUBCUTANEOUS at 02:08

## 2017-01-01 RX ADMIN — Medication 75 MCG/HR: at 02:30

## 2017-01-01 RX ADMIN — LORAZEPAM 2 MG: 2 INJECTION, SOLUTION INTRAMUSCULAR; INTRAVENOUS at 22:30

## 2017-01-01 RX ADMIN — Medication 75 MCG/HR: at 13:55

## 2017-01-01 RX ADMIN — LORAZEPAM 4 MG: 2 INJECTION, SOLUTION INTRAMUSCULAR; INTRAVENOUS at 04:48

## 2017-01-01 RX ADMIN — GABAPENTIN 300 MG: 300 CAPSULE ORAL at 22:20

## 2017-01-01 RX ADMIN — GABAPENTIN 600 MG: 300 CAPSULE ORAL at 19:59

## 2017-01-01 RX ADMIN — LORAZEPAM 2 MG: 2 INJECTION, SOLUTION INTRAMUSCULAR; INTRAVENOUS at 05:57

## 2017-01-01 RX ADMIN — LORAZEPAM 2 MG: 2 INJECTION INTRAMUSCULAR; INTRAVENOUS at 18:56

## 2017-01-01 RX ADMIN — MIDAZOLAM 1 MG: 1 INJECTION INTRAMUSCULAR; INTRAVENOUS at 08:23

## 2017-01-01 RX ADMIN — OXYCODONE HYDROCHLORIDE 10 MG: 10 TABLET ORAL at 12:36

## 2017-01-01 RX ADMIN — GABAPENTIN 300 MG: 300 CAPSULE ORAL at 10:03

## 2017-01-01 RX ADMIN — HYDROMORPHONE HYDROCHLORIDE 2 MG: 2 INJECTION INTRAMUSCULAR; INTRAVENOUS; SUBCUTANEOUS at 07:46

## 2017-01-01 RX ADMIN — GABAPENTIN 600 MG: 300 CAPSULE ORAL at 15:19

## 2017-01-01 RX ADMIN — METHADONE HYDROCHLORIDE 80 MG: 10 TABLET ORAL at 10:03

## 2017-01-01 RX ADMIN — MAGNESIUM HYDROXIDE 30 ML: 400 SUSPENSION ORAL at 21:44

## 2017-01-01 RX ADMIN — ENOXAPARIN SODIUM 40 MG: 100 INJECTION SUBCUTANEOUS at 09:33

## 2017-01-01 RX ADMIN — ENOXAPARIN SODIUM 40 MG: 100 INJECTION SUBCUTANEOUS at 09:19

## 2017-01-01 RX ADMIN — EPHEDRINE SULFATE 10 MG: 50 INJECTION INTRAMUSCULAR; INTRAVENOUS; SUBCUTANEOUS at 17:36

## 2017-01-01 RX ADMIN — LORAZEPAM 2 MG: 2 INJECTION, SOLUTION INTRAMUSCULAR; INTRAVENOUS at 13:52

## 2017-01-01 RX ADMIN — STANDARDIZED SENNA CONCENTRATE AND DOCUSATE SODIUM 2 TABLET: 8.6; 5 TABLET, FILM COATED ORAL at 09:34

## 2017-01-01 RX ADMIN — LORAZEPAM 4 MG: 2 SOLUTION, CONCENTRATE ORAL at 02:30

## 2017-01-01 RX ADMIN — HYDROMORPHONE HYDROCHLORIDE 1 MG: 2 INJECTION INTRAMUSCULAR; INTRAVENOUS; SUBCUTANEOUS at 12:31

## 2017-01-01 RX ADMIN — IOHEXOL 20 ML: 300 INJECTION, SOLUTION INTRAVENOUS at 08:56

## 2017-01-01 RX ADMIN — CHLORPROMAZINE HYDROCHLORIDE 100 MG: 25 INJECTION INTRAMUSCULAR at 11:40

## 2017-01-01 RX ADMIN — STANDARDIZED SENNA CONCENTRATE AND DOCUSATE SODIUM 2 TABLET: 8.6; 5 TABLET, FILM COATED ORAL at 21:44

## 2017-01-01 RX ADMIN — METHADONE HYDROCHLORIDE 60 MG: 10 CONCENTRATE ORAL at 15:54

## 2017-01-01 RX ADMIN — METHADONE HYDROCHLORIDE 60 MG: 10 CONCENTRATE ORAL at 21:09

## 2017-01-01 RX ADMIN — METHADONE HYDROCHLORIDE 20 MG: 10 TABLET ORAL at 09:53

## 2017-01-01 RX ADMIN — HYDROMORPHONE HYDROCHLORIDE 1 MG: 2 INJECTION INTRAMUSCULAR; INTRAVENOUS; SUBCUTANEOUS at 08:15

## 2017-01-01 RX ADMIN — HYDROMORPHONE HYDROCHLORIDE 8 MG: 4 TABLET ORAL at 09:31

## 2017-01-01 RX ADMIN — HALOPERIDOL LACTATE 1 MG: 5 INJECTION, SOLUTION INTRAMUSCULAR at 14:02

## 2017-01-01 RX ADMIN — LORAZEPAM 2 MG: 2 INJECTION, SOLUTION INTRAMUSCULAR; INTRAVENOUS at 15:53

## 2017-01-01 RX ADMIN — MORPHINE SULFATE 10 MG: 10 INJECTION INTRAVENOUS at 04:41

## 2017-01-01 RX ADMIN — LORAZEPAM 2 MG: 2 SOLUTION, CONCENTRATE ORAL at 08:19

## 2017-01-01 RX ADMIN — METHADONE HYDROCHLORIDE 80 MG: 10 TABLET ORAL at 09:39

## 2017-01-01 RX ADMIN — Medication 100 MCG/HR: at 10:23

## 2017-01-01 RX ADMIN — GABAPENTIN 600 MG: 300 CAPSULE ORAL at 15:12

## 2017-01-01 RX ADMIN — HYDROMORPHONE HYDROCHLORIDE 4 MG: 4 TABLET ORAL at 16:12

## 2017-01-01 RX ADMIN — METHADONE HYDROCHLORIDE 60 MG: 10 CONCENTRATE ORAL at 09:36

## 2017-01-01 RX ADMIN — METHADONE HYDROCHLORIDE 60 MG: 40 TABLET ORAL at 09:18

## 2017-01-01 RX ADMIN — METHADONE HYDROCHLORIDE 80 MG: 10 TABLET ORAL at 09:34

## 2017-01-01 RX ADMIN — STANDARDIZED SENNA CONCENTRATE AND DOCUSATE SODIUM 2 TABLET: 8.6; 5 TABLET, FILM COATED ORAL at 20:16

## 2017-01-01 RX ADMIN — MORPHINE SULFATE 20 MG: 100 SOLUTION ORAL at 05:26

## 2017-01-01 RX ADMIN — LORAZEPAM 4 MG: 2 SOLUTION, CONCENTRATE ORAL at 10:19

## 2017-01-01 RX ADMIN — GABAPENTIN 600 MG: 300 CAPSULE ORAL at 09:34

## 2017-01-01 RX ADMIN — METHADONE HYDROCHLORIDE 60 MG: 10 CONCENTRATE ORAL at 12:52

## 2017-01-01 RX ADMIN — HYDROMORPHONE HYDROCHLORIDE 2 MG: 2 INJECTION INTRAMUSCULAR; INTRAVENOUS; SUBCUTANEOUS at 17:02

## 2017-01-01 RX ADMIN — HYDROMORPHONE HYDROCHLORIDE 8 MG: 4 TABLET ORAL at 12:02

## 2017-01-01 RX ADMIN — HYDROMORPHONE HYDROCHLORIDE 1 MG: 2 INJECTION INTRAMUSCULAR; INTRAVENOUS; SUBCUTANEOUS at 22:20

## 2017-01-01 RX ADMIN — LORAZEPAM 2 MG: 2 INJECTION, SOLUTION INTRAMUSCULAR; INTRAVENOUS at 18:27

## 2017-01-01 RX ADMIN — DIBASIC SODIUM PHOSPHATE, MONOBASIC POTASSIUM PHOSPHATE AND MONOBASIC SODIUM PHOSPHATE 1 TABLET: 852; 155; 130 TABLET ORAL at 09:18

## 2017-01-01 RX ADMIN — HYDROMORPHONE HYDROCHLORIDE 1 MG: 2 INJECTION, SOLUTION INTRAMUSCULAR; INTRAVENOUS; SUBCUTANEOUS at 08:15

## 2017-01-01 RX ADMIN — HYDROMORPHONE HYDROCHLORIDE 1 MG: 2 INJECTION INTRAMUSCULAR; INTRAVENOUS; SUBCUTANEOUS at 23:45

## 2017-01-01 RX ADMIN — METHADONE HYDROCHLORIDE 80 MG: 40 TABLET ORAL at 09:53

## 2017-01-01 RX ADMIN — HYDROMORPHONE HYDROCHLORIDE 8 MG: 4 TABLET ORAL at 20:28

## 2017-01-01 RX ADMIN — LORAZEPAM 4 MG: 2 SOLUTION, CONCENTRATE ORAL at 15:07

## 2017-01-01 RX ADMIN — TIOTROPIUM BROMIDE 1 CAPSULE: 18 CAPSULE ORAL; RESPIRATORY (INHALATION) at 16:22

## 2017-01-01 RX ADMIN — LORAZEPAM 2 MG: 2 INJECTION INTRAMUSCULAR; INTRAVENOUS at 02:07

## 2017-01-01 RX ADMIN — METHADONE HYDROCHLORIDE 20 MG: 10 TABLET ORAL at 10:03

## 2017-01-01 RX ADMIN — BISACODYL 10 MG: 10 SUPPOSITORY RECTAL at 09:18

## 2017-01-01 RX ADMIN — MAGNESIUM HYDROXIDE 30 ML: 400 SUSPENSION ORAL at 20:22

## 2017-01-01 RX ADMIN — LORAZEPAM 2 MG: 2 INJECTION, SOLUTION INTRAMUSCULAR; INTRAVENOUS at 12:49

## 2017-01-01 RX ADMIN — HYDROMORPHONE HYDROCHLORIDE 1 MG: 2 INJECTION INTRAMUSCULAR; INTRAVENOUS; SUBCUTANEOUS at 17:25

## 2017-01-01 RX ADMIN — HYDROMORPHONE HYDROCHLORIDE 8 MG: 4 TABLET ORAL at 15:28

## 2017-01-01 RX ADMIN — DOCUSATE SODIUM 100 MG: 100 CAPSULE ORAL at 10:12

## 2017-01-01 RX ADMIN — LORAZEPAM 2 MG: 2 INJECTION, SOLUTION INTRAMUSCULAR; INTRAVENOUS at 09:27

## 2017-01-01 RX ADMIN — HYDROMORPHONE HYDROCHLORIDE 2 MG: 2 INJECTION INTRAMUSCULAR; INTRAVENOUS; SUBCUTANEOUS at 13:18

## 2017-01-01 RX ADMIN — Medication 2500 MCG: at 21:53

## 2017-01-01 RX ADMIN — LORAZEPAM 2 MG: 2 INJECTION, SOLUTION INTRAMUSCULAR; INTRAVENOUS at 15:27

## 2017-01-01 RX ADMIN — METHADONE HYDROCHLORIDE 10 MG: 10 CONCENTRATE ORAL at 22:00

## 2017-01-01 RX ADMIN — CHLORPROMAZINE HYDROCHLORIDE 100 MG: 25 INJECTION INTRAMUSCULAR at 21:28

## 2017-01-01 RX ADMIN — HYDROMORPHONE HYDROCHLORIDE 1 MG: 2 INJECTION INTRAMUSCULAR; INTRAVENOUS; SUBCUTANEOUS at 06:10

## 2017-01-01 RX ADMIN — GABAPENTIN 600 MG: 300 CAPSULE ORAL at 21:57

## 2017-01-01 RX ADMIN — HYDROMORPHONE HYDROCHLORIDE 1 MG: 2 INJECTION INTRAMUSCULAR; INTRAVENOUS; SUBCUTANEOUS at 22:48

## 2017-01-01 RX ADMIN — SODIUM CHLORIDE: 9 INJECTION, SOLUTION INTRAVENOUS at 20:24

## 2017-01-01 RX ADMIN — SODIUM CHLORIDE, SODIUM LACTATE, POTASSIUM CHLORIDE, CALCIUM CHLORIDE 1000 ML: 600; 310; 30; 20 INJECTION, SOLUTION INTRAVENOUS at 15:36

## 2017-01-01 RX ADMIN — HYDROMORPHONE HYDROCHLORIDE 2 MG: 2 INJECTION INTRAMUSCULAR; INTRAVENOUS; SUBCUTANEOUS at 06:34

## 2017-01-01 RX ADMIN — HYDROMORPHONE HYDROCHLORIDE 1 MG: 2 INJECTION INTRAMUSCULAR; INTRAVENOUS; SUBCUTANEOUS at 20:16

## 2017-01-01 RX ADMIN — LORAZEPAM 4 MG: 2 SOLUTION, CONCENTRATE ORAL at 21:29

## 2017-01-01 RX ADMIN — HALOPERIDOL LACTATE 1 MG: 5 INJECTION, SOLUTION INTRAMUSCULAR at 21:52

## 2017-01-01 RX ADMIN — HYDROMORPHONE HYDROCHLORIDE 1 MG: 2 INJECTION INTRAMUSCULAR; INTRAVENOUS; SUBCUTANEOUS at 02:15

## 2017-01-01 RX ADMIN — HALOPERIDOL LACTATE 1 MG: 5 INJECTION, SOLUTION INTRAMUSCULAR at 09:21

## 2017-01-01 RX ADMIN — POLYETHYLENE GLYCOL 3350 1 PACKET: 17 POWDER, FOR SOLUTION ORAL at 05:05

## 2017-01-01 RX ADMIN — METHADONE HYDROCHLORIDE 20 MG: 10 TABLET ORAL at 07:39

## 2017-01-01 RX ADMIN — HYDROMORPHONE HYDROCHLORIDE 1 MG: 2 INJECTION INTRAMUSCULAR; INTRAVENOUS; SUBCUTANEOUS at 08:03

## 2017-01-01 RX ADMIN — LORAZEPAM 2 MG: 2 INJECTION, SOLUTION INTRAMUSCULAR; INTRAVENOUS at 09:21

## 2017-01-01 RX ADMIN — LORAZEPAM 2 MG: 2 INJECTION, SOLUTION INTRAMUSCULAR; INTRAVENOUS at 16:48

## 2017-01-01 RX ADMIN — OXYCODONE HYDROCHLORIDE 10 MG: 10 TABLET ORAL at 09:19

## 2017-01-01 RX ADMIN — TIOTROPIUM BROMIDE 1 CAPSULE: 18 CAPSULE ORAL; RESPIRATORY (INHALATION) at 08:18

## 2017-01-01 RX ADMIN — HYDROMORPHONE HYDROCHLORIDE 2 MG: 2 INJECTION INTRAMUSCULAR; INTRAVENOUS; SUBCUTANEOUS at 13:39

## 2017-01-01 RX ADMIN — LORAZEPAM 1 MG: 2 INJECTION INTRAMUSCULAR; INTRAVENOUS at 01:37

## 2017-01-01 RX ADMIN — DOCUSATE SODIUM 100 MG: 100 CAPSULE ORAL at 09:24

## 2017-01-01 RX ADMIN — DOCUSATE SODIUM 100 MG: 100 CAPSULE ORAL at 18:18

## 2017-01-01 RX ADMIN — HYDROMORPHONE HYDROCHLORIDE 1 MG: 2 INJECTION INTRAMUSCULAR; INTRAVENOUS; SUBCUTANEOUS at 21:44

## 2017-01-01 RX ADMIN — GABAPENTIN 300 MG: 300 CAPSULE ORAL at 16:10

## 2017-01-01 RX ADMIN — METHADONE HYDROCHLORIDE 60 MG: 10 CONCENTRATE ORAL at 08:19

## 2017-01-01 RX ADMIN — HALOPERIDOL LACTATE 2 MG: 2 SOLUTION, CONCENTRATE ORAL at 00:35

## 2017-01-01 RX ADMIN — GABAPENTIN 300 MG: 300 CAPSULE ORAL at 20:23

## 2017-01-01 RX ADMIN — LORAZEPAM 2 MG: 2 INJECTION, SOLUTION INTRAMUSCULAR; INTRAVENOUS at 21:53

## 2017-01-01 RX ADMIN — PIPERACILLIN AND TAZOBACTAM 3.38 G: 3; .375 INJECTION, POWDER, FOR SOLUTION INTRAVENOUS at 15:30

## 2017-01-01 RX ADMIN — PHYTONADIONE 10 MG: 10 INJECTION, EMULSION INTRAMUSCULAR; INTRAVENOUS; SUBCUTANEOUS at 08:20

## 2017-01-01 RX ADMIN — MORPHINE SULFATE 20 MG: 100 SOLUTION ORAL at 03:20

## 2017-01-01 RX ADMIN — METHADONE HYDROCHLORIDE 60 MG: 10 TABLET ORAL at 07:46

## 2017-01-01 RX ADMIN — LORAZEPAM 2 MG: 2 INJECTION, SOLUTION INTRAMUSCULAR; INTRAVENOUS at 11:52

## 2017-01-01 RX ADMIN — HALOPERIDOL LACTATE 2 MG: 5 INJECTION, SOLUTION INTRAMUSCULAR at 01:54

## 2017-01-01 RX ADMIN — MAGNESIUM HYDROXIDE 30 ML: 400 SUSPENSION ORAL at 14:14

## 2017-01-01 RX ADMIN — BISACODYL 10 MG: 10 SUPPOSITORY RECTAL at 10:38

## 2017-01-01 RX ADMIN — HYDROMORPHONE HYDROCHLORIDE 8 MG: 4 TABLET ORAL at 18:04

## 2017-01-01 RX ADMIN — STANDARDIZED SENNA CONCENTRATE AND DOCUSATE SODIUM 2 TABLET: 8.6; 5 TABLET, FILM COATED ORAL at 22:20

## 2017-01-01 RX ADMIN — GABAPENTIN 600 MG: 300 CAPSULE ORAL at 21:44

## 2017-01-01 RX ADMIN — EPHEDRINE SULFATE 10 MG: 50 INJECTION INTRAMUSCULAR; INTRAVENOUS; SUBCUTANEOUS at 18:08

## 2017-01-01 RX ADMIN — ACETAMINOPHEN 650 MG: 325 TABLET, FILM COATED ORAL at 20:00

## 2017-01-01 RX ADMIN — HALOPERIDOL LACTATE 1 MG: 5 INJECTION, SOLUTION INTRAMUSCULAR at 21:08

## 2017-01-01 RX ADMIN — Medication 75 MCG/HR: at 01:37

## 2017-01-01 RX ADMIN — LORAZEPAM 2 MG: 2 SOLUTION, CONCENTRATE ORAL at 16:07

## 2017-01-01 RX ADMIN — HYDROMORPHONE HYDROCHLORIDE 2 MG: 2 INJECTION INTRAMUSCULAR; INTRAVENOUS; SUBCUTANEOUS at 13:45

## 2017-01-01 RX ADMIN — HYDROMORPHONE HYDROCHLORIDE 1 MG: 2 INJECTION INTRAMUSCULAR; INTRAVENOUS; SUBCUTANEOUS at 15:37

## 2017-01-01 RX ADMIN — GABAPENTIN 600 MG: 300 CAPSULE ORAL at 20:16

## 2017-01-01 RX ADMIN — METHADONE HYDROCHLORIDE 40 MG: 10 TABLET ORAL at 11:54

## 2017-01-01 RX ADMIN — STANDARDIZED SENNA CONCENTRATE AND DOCUSATE SODIUM 2 TABLET: 8.6; 5 TABLET, FILM COATED ORAL at 08:17

## 2017-01-01 RX ADMIN — STANDARDIZED SENNA CONCENTRATE AND DOCUSATE SODIUM 2 TABLET: 8.6; 5 TABLET, FILM COATED ORAL at 09:38

## 2017-01-01 RX ADMIN — ACETAMINOPHEN 650 MG: 325 TABLET, FILM COATED ORAL at 04:28

## 2017-01-01 RX ADMIN — LORAZEPAM 2 MG: 2 INJECTION INTRAMUSCULAR; INTRAVENOUS at 05:30

## 2017-01-01 RX ADMIN — Medication 2500 MCG: at 17:46

## 2017-01-01 RX ADMIN — HYDROMORPHONE HYDROCHLORIDE 4 MG: 4 TABLET ORAL at 21:57

## 2017-01-01 RX ADMIN — STANDARDIZED SENNA CONCENTRATE AND DOCUSATE SODIUM 2 TABLET: 8.6; 5 TABLET, FILM COATED ORAL at 10:03

## 2017-01-01 RX ADMIN — HALOPERIDOL LACTATE 2 MG: 5 INJECTION, SOLUTION INTRAMUSCULAR at 04:50

## 2017-01-01 RX ADMIN — METHADONE HYDROCHLORIDE 60 MG: 10 CONCENTRATE ORAL at 10:12

## 2017-01-01 RX ADMIN — HALOPERIDOL LACTATE 2 MG: 5 INJECTION, SOLUTION INTRAMUSCULAR at 14:29

## 2017-01-01 RX ADMIN — POLYETHYLENE GLYCOL 3350 1 PACKET: 17 POWDER, FOR SOLUTION ORAL at 12:31

## 2017-01-01 RX ADMIN — STANDARDIZED SENNA CONCENTRATE AND DOCUSATE SODIUM 2 TABLET: 8.6; 5 TABLET, FILM COATED ORAL at 22:15

## 2017-01-01 RX ADMIN — HYDROMORPHONE HYDROCHLORIDE 1 MG: 2 INJECTION INTRAMUSCULAR; INTRAVENOUS; SUBCUTANEOUS at 10:02

## 2017-01-01 RX ADMIN — CHLORPROMAZINE HYDROCHLORIDE 100 MG: 25 INJECTION INTRAMUSCULAR at 15:08

## 2017-01-01 RX ADMIN — LORAZEPAM 2 MG: 2 INJECTION, SOLUTION INTRAMUSCULAR; INTRAVENOUS at 21:55

## 2017-01-01 RX ADMIN — FENTANYL CITRATE 25 MCG: 50 INJECTION, SOLUTION INTRAMUSCULAR; INTRAVENOUS at 08:11

## 2017-01-01 RX ADMIN — MORPHINE SULFATE 20 MG: 100 SOLUTION ORAL at 20:28

## 2017-01-01 RX ADMIN — HYDROMORPHONE HYDROCHLORIDE 2 MG: 2 INJECTION INTRAMUSCULAR; INTRAVENOUS; SUBCUTANEOUS at 10:05

## 2017-01-01 RX ADMIN — SODIUM CHLORIDE: 9 INJECTION, SOLUTION INTRAVENOUS at 09:19

## 2017-01-01 RX ADMIN — HYDROMORPHONE HYDROCHLORIDE 1 MG: 2 INJECTION INTRAMUSCULAR; INTRAVENOUS; SUBCUTANEOUS at 04:57

## 2017-01-01 RX ADMIN — ENOXAPARIN SODIUM 40 MG: 100 INJECTION SUBCUTANEOUS at 10:02

## 2017-01-01 RX ADMIN — HYDROMORPHONE HYDROCHLORIDE 1 MG: 2 INJECTION INTRAMUSCULAR; INTRAVENOUS; SUBCUTANEOUS at 09:33

## 2017-01-01 RX ADMIN — HYDROMORPHONE HYDROCHLORIDE 8 MG: 4 TABLET ORAL at 06:12

## 2017-01-01 RX ADMIN — CHLORPROMAZINE HYDROCHLORIDE 100 MG: 25 INJECTION INTRAMUSCULAR at 05:51

## 2017-01-01 RX ADMIN — LORAZEPAM 2 MG: 2 INJECTION, SOLUTION INTRAMUSCULAR; INTRAVENOUS at 06:27

## 2017-01-01 RX ADMIN — ENOXAPARIN SODIUM 40 MG: 100 INJECTION SUBCUTANEOUS at 07:42

## 2017-01-01 RX ADMIN — CHLORPROMAZINE HYDROCHLORIDE 100 MG: 25 INJECTION INTRAMUSCULAR at 16:06

## 2017-01-01 RX ADMIN — LORAZEPAM 2 MG: 2 INJECTION, SOLUTION INTRAMUSCULAR; INTRAVENOUS at 11:21

## 2017-01-01 RX ADMIN — LORAZEPAM 2 MG: 2 INJECTION, SOLUTION INTRAMUSCULAR; INTRAVENOUS at 18:39

## 2017-01-01 RX ADMIN — MORPHINE SULFATE 20 MG: 100 SOLUTION ORAL at 21:57

## 2017-01-01 RX ADMIN — HYDROMORPHONE HYDROCHLORIDE 8 MG: 4 TABLET ORAL at 15:47

## 2017-01-01 RX ADMIN — METHADONE HYDROCHLORIDE 60 MG: 10 CONCENTRATE ORAL at 10:28

## 2017-01-01 RX ADMIN — DIBASIC SODIUM PHOSPHATE, MONOBASIC POTASSIUM PHOSPHATE AND MONOBASIC SODIUM PHOSPHATE 1 TABLET: 852; 155; 130 TABLET ORAL at 12:39

## 2017-01-01 RX ADMIN — HALOPERIDOL LACTATE 2 MG: 5 INJECTION, SOLUTION INTRAMUSCULAR at 09:29

## 2017-01-01 RX ADMIN — HYDROMORPHONE HYDROCHLORIDE 0.5 MG: 2 INJECTION INTRAMUSCULAR; INTRAVENOUS; SUBCUTANEOUS at 18:21

## 2017-01-01 RX ADMIN — LORAZEPAM 4 MG: 2 SOLUTION, CONCENTRATE ORAL at 21:09

## 2017-01-01 RX ADMIN — MORPHINE SULFATE 20 MG: 100 SOLUTION ORAL at 06:34

## 2017-01-01 RX ADMIN — HALOPERIDOL LACTATE 2 MG: 5 INJECTION, SOLUTION INTRAMUSCULAR at 18:47

## 2017-01-01 RX ADMIN — LORAZEPAM 2 MG: 2 INJECTION, SOLUTION INTRAMUSCULAR; INTRAVENOUS at 09:22

## 2017-01-01 RX ADMIN — HYDROMORPHONE HYDROCHLORIDE 1 MG: 2 INJECTION INTRAMUSCULAR; INTRAVENOUS; SUBCUTANEOUS at 02:55

## 2017-01-01 RX ADMIN — MIDAZOLAM 1 MG: 1 INJECTION INTRAMUSCULAR; INTRAVENOUS at 08:12

## 2017-01-01 RX ADMIN — PHYTONADIONE 10 MG: 10 INJECTION, EMULSION INTRAMUSCULAR; INTRAVENOUS; SUBCUTANEOUS at 16:11

## 2017-01-01 RX ADMIN — HYDROMORPHONE HYDROCHLORIDE 4 MG: 4 TABLET ORAL at 18:45

## 2017-01-01 RX ADMIN — STANDARDIZED SENNA CONCENTRATE AND DOCUSATE SODIUM 2 TABLET: 8.6; 5 TABLET, FILM COATED ORAL at 07:40

## 2017-01-01 RX ADMIN — Medication 100 MCG/HR: at 13:17

## 2017-01-01 RX ADMIN — HYDROMORPHONE HYDROCHLORIDE 1 MG: 2 INJECTION INTRAMUSCULAR; INTRAVENOUS; SUBCUTANEOUS at 11:33

## 2017-01-01 RX ADMIN — STANDARDIZED SENNA CONCENTRATE AND DOCUSATE SODIUM 2 TABLET: 8.6; 5 TABLET, FILM COATED ORAL at 21:57

## 2017-01-01 RX ADMIN — LORAZEPAM 2 MG: 2 INJECTION INTRAMUSCULAR; INTRAVENOUS at 10:12

## 2017-01-01 RX ADMIN — LORAZEPAM 1 MG: 2 INJECTION INTRAMUSCULAR; INTRAVENOUS at 09:36

## 2017-01-01 RX ADMIN — ACETAMINOPHEN 650 MG: 325 TABLET, FILM COATED ORAL at 12:39

## 2017-01-01 RX ADMIN — HALOPERIDOL LACTATE 2 MG: 5 INJECTION, SOLUTION INTRAMUSCULAR at 13:55

## 2017-01-01 RX ADMIN — METHADONE HYDROCHLORIDE 60 MG: 10 CONCENTRATE ORAL at 20:28

## 2017-01-01 RX ADMIN — GABAPENTIN 600 MG: 300 CAPSULE ORAL at 09:38

## 2017-01-01 RX ADMIN — POLYETHYLENE GLYCOL 3350 1 PACKET: 17 POWDER, FOR SOLUTION ORAL at 09:19

## 2017-01-01 RX ADMIN — GABAPENTIN 600 MG: 300 CAPSULE ORAL at 13:38

## 2017-01-01 RX ADMIN — HYDROMORPHONE HYDROCHLORIDE 2 MG: 2 INJECTION INTRAMUSCULAR; INTRAVENOUS; SUBCUTANEOUS at 18:23

## 2017-01-01 RX ADMIN — GABAPENTIN 600 MG: 300 CAPSULE ORAL at 14:53

## 2017-01-01 RX ADMIN — HALOPERIDOL LACTATE 1 MG: 5 INJECTION, SOLUTION INTRAMUSCULAR at 09:28

## 2017-01-01 RX ADMIN — STANDARDIZED SENNA CONCENTRATE AND DOCUSATE SODIUM 2 TABLET: 8.6; 5 TABLET, FILM COATED ORAL at 20:23

## 2017-01-01 RX ADMIN — DOCUSATE SODIUM 100 MG: 100 CAPSULE ORAL at 09:36

## 2017-01-01 RX ADMIN — GABAPENTIN 600 MG: 300 CAPSULE ORAL at 09:18

## 2017-01-01 RX ADMIN — BISACODYL 10 MG: 10 SUPPOSITORY RECTAL at 09:33

## 2017-01-01 RX ADMIN — HYDROMORPHONE HYDROCHLORIDE 1 MG: 2 INJECTION INTRAMUSCULAR; INTRAVENOUS; SUBCUTANEOUS at 02:05

## 2017-01-01 RX ADMIN — METHADONE HYDROCHLORIDE 60 MG: 10 CONCENTRATE ORAL at 22:38

## 2017-01-01 RX ADMIN — MIDAZOLAM 1 MG: 1 INJECTION INTRAMUSCULAR; INTRAVENOUS at 08:10

## 2017-01-01 RX ADMIN — HYDROMORPHONE HYDROCHLORIDE 1 MG: 2 INJECTION INTRAMUSCULAR; INTRAVENOUS; SUBCUTANEOUS at 14:14

## 2017-01-01 RX ADMIN — METHADONE HYDROCHLORIDE 60 MG: 10 CONCENTRATE ORAL at 20:22

## 2017-01-01 RX ADMIN — LORAZEPAM 2 MG: 2 SOLUTION, CONCENTRATE ORAL at 00:35

## 2017-01-01 RX ADMIN — DOCUSATE SODIUM 100 MG: 100 CAPSULE ORAL at 09:27

## 2017-01-01 RX ADMIN — LORAZEPAM 2 MG: 2 INJECTION, SOLUTION INTRAMUSCULAR; INTRAVENOUS at 18:44

## 2017-01-01 RX ADMIN — HYDROMORPHONE HYDROCHLORIDE 1 MG: 2 INJECTION INTRAMUSCULAR; INTRAVENOUS; SUBCUTANEOUS at 08:20

## 2017-01-01 RX ADMIN — LORAZEPAM 2 MG: 2 INJECTION, SOLUTION INTRAMUSCULAR; INTRAVENOUS at 02:00

## 2017-01-01 RX ADMIN — METHADONE HYDROCHLORIDE 100 MG: 10 CONCENTRATE ORAL at 16:11

## 2017-01-01 RX ADMIN — STANDARDIZED SENNA CONCENTRATE AND DOCUSATE SODIUM 1 TABLET: 8.6; 5 TABLET, FILM COATED ORAL at 22:22

## 2017-01-01 RX ADMIN — STANDARDIZED SENNA CONCENTRATE AND DOCUSATE SODIUM 2 TABLET: 8.6; 5 TABLET, FILM COATED ORAL at 09:18

## 2017-01-01 RX ADMIN — Medication 50 MCG: at 12:51

## 2017-01-01 RX ADMIN — HALOPERIDOL LACTATE 2 MG: 2 SOLUTION, CONCENTRATE ORAL at 21:57

## 2017-01-01 RX ADMIN — LORAZEPAM 4 MG: 2 SOLUTION, CONCENTRATE ORAL at 05:51

## 2017-01-01 RX ADMIN — GABAPENTIN 600 MG: 300 CAPSULE ORAL at 15:00

## 2017-01-01 RX ADMIN — HYDROMORPHONE HYDROCHLORIDE 2 MG: 2 INJECTION INTRAMUSCULAR; INTRAVENOUS; SUBCUTANEOUS at 02:15

## 2017-01-01 RX ADMIN — HYDROMORPHONE HYDROCHLORIDE 8 MG: 4 TABLET ORAL at 01:17

## 2017-01-01 RX ADMIN — DIBASIC SODIUM PHOSPHATE, MONOBASIC POTASSIUM PHOSPHATE AND MONOBASIC SODIUM PHOSPHATE 1 TABLET: 852; 155; 130 TABLET ORAL at 20:00

## 2017-01-01 RX ADMIN — MORPHINE SULFATE 20 MG: 100 SOLUTION ORAL at 00:35

## 2017-01-01 RX ADMIN — METHADONE HYDROCHLORIDE 60 MG: 10 CONCENTRATE ORAL at 05:51

## 2017-01-01 RX ADMIN — STANDARDIZED SENNA CONCENTRATE AND DOCUSATE SODIUM 2 TABLET: 8.6; 5 TABLET, FILM COATED ORAL at 09:17

## 2017-01-01 RX ADMIN — LORAZEPAM 4 MG: 2 SOLUTION, CONCENTRATE ORAL at 17:23

## 2017-01-01 RX ADMIN — METHADONE HYDROCHLORIDE 50 MG: 10 CONCENTRATE ORAL at 22:22

## 2017-01-01 RX ADMIN — HALOPERIDOL LACTATE 2 MG: 5 INJECTION, SOLUTION INTRAMUSCULAR at 21:57

## 2017-01-01 RX ADMIN — HYDROMORPHONE HYDROCHLORIDE 2 MG: 2 INJECTION INTRAMUSCULAR; INTRAVENOUS; SUBCUTANEOUS at 23:16

## 2017-01-01 SDOH — ECONOMIC STABILITY: HOUSING INSECURITY: UNSAFE COOKING RANGE AREA: 0

## 2017-01-01 SDOH — ECONOMIC STABILITY: HOUSING INSECURITY: UNSAFE APPLIANCES: 0

## 2017-01-01 SDOH — ECONOMIC STABILITY: GENERAL

## 2017-01-01 ASSESSMENT — ENCOUNTER SYMPTOMS
SHORTNESS OF BREATH: 0
VOMITING: 0
WEAKNESS: 0
ABDOMINAL PAIN: 0
SHORTNESS OF BREATH: 0
CHILLS: 0
FATIGUE: 0
JOINT SWELLING: 0
CONSTIPATION: 1
SLEEP QUALITY: ADEQUATE
PALPITATIONS: 0
FEVER: 0
HALLUCINATIONS: 0
BACK PAIN: 1
AGITATION: 1
ABDOMINAL PAIN: 1
SPUTUM PRODUCTION: 0
ABDOMINAL PAIN: 1
LOWER EXTREMITY EDEMA: 1
FEVER: 0
HEARTBURN: 0
COUGH: 0
FOCAL WEAKNESS: 0
CONSTITUTIONAL NEGATIVE: 1
NAUSEA: 0
MYALGIAS: 0
DIARRHEA: 1
VOMITING: 0
RESPIRATORY NEGATIVE: 1
COUGH: 0
SLEEP QUALITY: ADEQUATE
MYALGIAS: 0
AGITATION: 1
HEARTBURN: 0
ROS SKIN COMMENTS: JAUNDICE
PALPITATIONS: 0
VOMITING: 0
ABDOMINAL PAIN: 0
SHORTNESS OF BREATH: 0
BLOOD IN STOOL: 0
HEARTBURN: 0
NAUSEA: 0
WEAKNESS: 1
BACK PAIN: 1
FLATUS: 1
HEADACHES: 0
FEVER: 0
VOMITING: 0
COUGH: 0
COUGH: 0
NAUSEA: 0
BOWEL PATTERN NORMAL: 1
CHILLS: 0
FEVER: 0
DIARRHEA: 1
BOWEL INCONTINENCE: 1
WEIGHT LOSS: 0
WEAKNESS: 0
SPUTUM PRODUCTION: 0
BLOATING: 1
ARTHRALGIAS: 0
CHILLS: 0
PSYCHIATRIC NEGATIVE: 1
SWEATS: 0
NAUSEA: 0
CHILLS: 0
HEADACHES: 0
LAST BOWEL MOVEMENT: 64635
VOMITING: 0
MEMORY LOSS: 1
HEADACHES: 0
CHEST TIGHTNESS: 1
HEADACHES: 0
ABDOMINAL PAIN: 0
CHILLS: 0
DIAPHORESIS: 0
ANOREXIA: 0
CONSTITUTIONAL NEGATIVE: 1
NERVOUS/ANXIOUS: 1
DIARRHEA: 1
CHILLS: 0
SHORTNESS OF BREATH: 1
COUGH: 0
ARTHRALGIAS: 0
ABDOMINAL PAIN: 1
COUGH: 0
SPUTUM PRODUCTION: 0
BLOOD IN STOOL: 0
NECK PAIN: 0
SLEEP QUALITY: ADEQUATE
VISUAL CHANGE: 0
VOMITING: 0
FLANK PAIN: 0
PALPITATIONS: 0
SWOLLEN GLANDS: 0
CARDIOVASCULAR NEGATIVE: 1
HEADACHES: 0
SPUTUM PRODUCTION: 0
NAUSEA: 0
PALPITATIONS: 0
VOMITING: 0
LAST BOWEL MOVEMENT: 64633
COUGH: 0
SHORTNESS OF BREATH: 0
RESPIRATORY NEGATIVE: 1
DIARRHEA: 0
DIARRHEA: 1
FATIGUE: 1
CONSTIPATION: 1
FEVER: 0
FEVER: 0
FATIGUES EASILY: 1
ABDOMINAL PAIN: 1
HEADACHES: 0
NAUSEA: 0
MEMORY LOSS: 1
FEVER: 0
VERTIGO: 0
CONSTITUTIONAL NEGATIVE: 1
VOMITING: 0
LAST BOWEL MOVEMENT: 64630
FATIGUE: 1
SORE THROAT: 0
RESPIRATORY NEGATIVE: 1
NAUSEA: 1
NAUSEA: 0
NEUROLOGICAL NEGATIVE: 1
PALPITATIONS: 0
DIZZINESS: 0
CHILLS: 0
NAUSEA: 0
STOOL FREQUENCY: LESS THAN DAILY
MUSCULOSKELETAL NEGATIVE: 1
EYES NEGATIVE: 1
SHORTNESS OF BREATH: 1
TINGLING: 0
VOMITING: 0
FORGETFULNESS: 1
ABDOMINAL PAIN: 1
DIZZINESS: 0
SOMNOLENCE: 1
LAST BOWEL MOVEMENT: 64633
SPUTUM PRODUCTION: 0
BACK PAIN: 0
ABDOMINAL PAIN: 1
VOMITING: 0
HEADACHES: 0
HYPOTENSION: 1
ABDOMINAL PAIN: 0
ABDOMINAL PAIN: 1
LAST BOWEL MOVEMENT: 64631
NAUSEA: 0
SHORTNESS OF BREATH: 0
VOMITING: 0
FEVER: 0
SLEEP QUALITY: ADEQUATE
COUGH: 1
ABDOMINAL PAIN: 0
ABDOMINAL PAIN: 0
DEPRESSION: 0
CONSTIPATION: 1
CARDIOVASCULAR NEGATIVE: 1
FEVER: 0
STOOL FREQUENCY: LESS THAN DAILY
CARDIOVASCULAR NEGATIVE: 1
FEVER: 0
ABDOMINAL PAIN: 0
CHANGE IN BOWEL HABIT: 0
NAUSEA: 0
STOOL FREQUENCY: LESS THAN DAILY
TINGLING: 1
SOMNOLENCE: 1
TINGLING: 1
CHILLS: 0
MYALGIAS: 0
SORE THROAT: 0
PALPITATIONS: 0
BOWEL INCONTINENCE: 1

## 2017-01-01 ASSESSMENT — PAIN SCALES - GENERAL
PAINLEVEL_OUTOF10: 10
PAINLEVEL_OUTOF10: 10
PAINLEVEL_OUTOF10: 0
PAINLEVEL_OUTOF10: ASSUMED PAIN PRESENT
PAINLEVEL_OUTOF10: 10
PAINLEVEL_OUTOF10: 3
PAINLEVEL_OUTOF10: 10
PAINLEVEL_OUTOF10: 10
PAINLEVEL_OUTOF10: 7
PAINLEVEL_OUTOF10: 4
PAINLEVEL_OUTOF10: ASSUMED PAIN PRESENT
PAINLEVEL_OUTOF10: 10
PAINLEVEL_OUTOF10: 4
PAINLEVEL_OUTOF10: 8
PAINLEVEL_OUTOF10: 0
PAINLEVEL_OUTOF10: 5
PAINLEVEL_OUTOF10: 0
PAINLEVEL_OUTOF10: 0
PAINLEVEL_OUTOF10: 9
PAINLEVEL_OUTOF10: 4
PAINLEVEL_OUTOF10: 0
PAINLEVEL_OUTOF10: 8
PAINLEVEL_OUTOF10: 0
PAINLEVEL_OUTOF10: 9
PAINLEVEL_OUTOF10: 5
PAINLEVEL_OUTOF10: ASSUMED PAIN PRESENT
PAINLEVEL_OUTOF10: 6
PAINLEVEL_OUTOF10: 7
PAINLEVEL_OUTOF10: 7
PAINLEVEL_OUTOF10: ASSUMED PAIN PRESENT
PAINLEVEL_OUTOF10: 10
PAINLEVEL_OUTOF10: 7
PAINLEVEL_OUTOF10: ASSUMED PAIN PRESENT
PAINLEVEL_OUTOF10: 8
PAINLEVEL_OUTOF10: 7
PAINLEVEL_OUTOF10: 8
PAINLEVEL_OUTOF10: ASSUMED PAIN PRESENT
PAINLEVEL_OUTOF10: 8
PAINLEVEL_OUTOF10: 4
PAINLEVEL_OUTOF10: 9
PAINLEVEL_OUTOF10: 5
PAINLEVEL_OUTOF10: 3
PAINLEVEL_OUTOF10: 7
PAINLEVEL_OUTOF10: 6
PAINLEVEL_OUTOF10: ASSUMED PAIN PRESENT
PAINLEVEL_OUTOF10: 10
PAINLEVEL: NO PAIN
PAINLEVEL_OUTOF10: 4
PAINLEVEL_OUTOF10: ASSUMED PAIN PRESENT
PAINLEVEL_OUTOF10: 5
PAINLEVEL_OUTOF10: 7
PAINLEVEL_OUTOF10: 5
PAINLEVEL_OUTOF10: 4
PAINLEVEL_OUTOF10: 7
PAINLEVEL_OUTOF10: 3
PAINLEVEL_OUTOF10: ASSUMED PAIN PRESENT
PAINLEVEL_OUTOF10: ASSUMED PAIN PRESENT
PAINLEVEL_OUTOF10: 4
PAINLEVEL: NO PAIN
PAINLEVEL_OUTOF10: 8
PAINLEVEL_OUTOF10: 9
PAINLEVEL_OUTOF10: 4
PAINLEVEL_OUTOF10: 8
PAINLEVEL_OUTOF10: 9
PAINLEVEL_OUTOF10: 9
PAINLEVEL_OUTOF10: 6
PAINLEVEL_OUTOF10: 9
PAINLEVEL_OUTOF10: 10
PAINLEVEL_OUTOF10: 5
PAINLEVEL_OUTOF10: ASSUMED PAIN PRESENT
PAINLEVEL_OUTOF10: 6
PAINLEVEL_OUTOF10: ASSUMED PAIN PRESENT
PAINLEVEL_OUTOF10: ASSUMED PAIN PRESENT
PAINLEVEL_OUTOF10: 4
PAINLEVEL_OUTOF10: 0
PAINLEVEL_OUTOF10: 8
PAINLEVEL_OUTOF10: ASSUMED PAIN PRESENT
PAINLEVEL_OUTOF10: 4
PAINLEVEL_OUTOF10: ASSUMED PAIN PRESENT
PAINLEVEL_OUTOF10: 0
PAINLEVEL_OUTOF10: 7
PAINLEVEL_OUTOF10: ASSUMED PAIN PRESENT
PAINLEVEL_OUTOF10: ASSUMED PAIN PRESENT
PAINLEVEL_OUTOF10: 7
PAINLEVEL_OUTOF10: 0
PAINLEVEL_OUTOF10: 7
PAINLEVEL_OUTOF10: 6
PAINLEVEL_OUTOF10: 8
PAINLEVEL_OUTOF10: 7
PAINLEVEL_OUTOF10: 7
PAINLEVEL_OUTOF10: 4
PAINLEVEL_OUTOF10: 7
PAINLEVEL_OUTOF10: 7
PAINLEVEL_OUTOF10: 10
PAINLEVEL_OUTOF10: 6
PAINLEVEL_OUTOF10: 7
PAINLEVEL_OUTOF10: 8
PAINLEVEL: NO PAIN
PAINLEVEL_OUTOF10: ASSUMED PAIN PRESENT
PAINLEVEL_OUTOF10: 7
PAINLEVEL_OUTOF10: 6
PAINLEVEL_OUTOF10: 3
PAINLEVEL_OUTOF10: 10
PAINLEVEL_OUTOF10: 6
PAINLEVEL_OUTOF10: 10
PAINLEVEL_OUTOF10: 9
PAINLEVEL_OUTOF10: 5
PAINLEVEL_OUTOF10: 5
PAINLEVEL_OUTOF10: 4
PAINLEVEL_OUTOF10: 8
PAINLEVEL_OUTOF10: ASSUMED PAIN PRESENT
PAINLEVEL_OUTOF10: 0
PAINLEVEL_OUTOF10: 9

## 2017-01-01 ASSESSMENT — SOCIAL DETERMINANTS OF HEALTH (SDOH)
EXPRESSED_FINANCIAL_NEED: 1
ACTIVE STRESSOR - LACK OF CAREGIVERS: 1
ACTIVE STRESSOR - EXPRESSED EMOTIONAL NEED: 1
ACTIVE STRESSOR - NO STRESS FACTORS: 1
ACTIVE STRESSOR - HEALTH CHANGES: 1
ACTIVE STRESSOR - HEALTH CHANGES: 1

## 2017-01-01 ASSESSMENT — LIFESTYLE VARIABLES
DO YOU DRINK ALCOHOL: NO
EVER_SMOKED: YES
DO YOU DRINK ALCOHOL: NO

## 2017-01-01 ASSESSMENT — ACTIVITIES OF DAILY LIVING (ADL)
MONEY MANAGEMENT (EXPENSES/BILLS): TOTALLY DEPENDENT
MONEY MANAGEMENT (EXPENSES/BILLS): NEEDS ASSISTANCE
AMBULATION_REQUIRES_ASSISTANCE: 1
CONTINENCE_REQUIRES_ASSISTANCE: 1
MONEY MANAGEMENT (EXPENSES/BILLS): NEEDS ASSISTANCE
PHYSICAL_TRANSFER_REQUIRES_ASSISTANCE: 1

## 2017-01-01 ASSESSMENT — COPD QUESTIONNAIRES
DURING THE PAST 4 WEEKS HOW MUCH DID YOU FEEL SHORT OF BREATH: SOME OF THE TIME
COPD SCREENING SCORE: 5
DO YOU EVER COUGH UP ANY MUCUS OR PHLEGM?: NO/ONLY WITH OCCASIONAL COLDS OR INFECTIONS
HAVE YOU SMOKED AT LEAST 100 CIGARETTES IN YOUR ENTIRE LIFE: YES

## 2017-01-01 ASSESSMENT — PATIENT HEALTH QUESTIONNAIRE - PHQ9: CLINICAL INTERPRETATION OF PHQ2 SCORE: 0

## 2017-02-02 NOTE — Clinical Note
"     [facility]   [address] [facility] [facility] [address]  [place] [address]  Phone: [phone]  Fax: [phone]              Tj Barrios  1950    Encounter Date: 2/2/2017    Papo Troare M.D.          PROGRESS NOTE:  Subjective:   Tj Barrios is a 67 y.o. male who presents today for initial consultation regarding dyspnea. 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 He has no history of coronary artery disease, hypertension, diabetes, or other cardiovascular risk factors aside from a heavy smoking history. He notes that he became more dyspneic over the past year in concert with a 40 pound weight gain. He also has hepatitis C.    Cine chest discomfort whatsoever with exertional or at rest. The referring notes indicate a \"buzzing sensation\" in his chest. He denies this adamantly. Nonetheless he would refuse a stress test in any form he indicates to me. He is active and dyspneic with activities most likely related to his advanced COPD.    Past Medical History   Diagnosis Date   • COPD (chronic obstructive pulmonary disease) (CMS-[facility]) 9/6/2011   • Hepatitis C 9/6/2011   • Arthritis 9/6/2011   • Chronic pain 9/6/2011   • BPH (benign prostatic hyperplasia) 9/6/2011   • Mood disorder (CMS-HCC) 9/6/2011     History reviewed. No pertinent past surgical history.  Family History   Problem Relation Age of Onset   • Cancer Mother    • Heart Disease Mother    • Hypertension Mother    • Arthritis Mother    • Hypertension Father    • Arthritis Father    • Stroke Father      History   Smoking status   • Never Smoker    Smokeless tobacco   • Never Used     Allergies   Allergen Reactions   • Indocin [Indometacin Sodium]      Severe headaches     Outpatient Encounter Prescriptions as of 2/2/2017   Medication Sig Dispense Refill   • methadone (DOLOPHINE) 10 MG/ML Conc Take 5 mg by mouth.     • vitamin D, Ergocalciferol, (DRISDOL) " "73588 UNITS Cap capsule TAKE ONE CAPSULE BY MOUTH EVERY 28 DAYS 3 Cap 3   • Misc. Devices Misc O2 NC by portable concentrator to use as directed to keep O2 sats > 90% 1 Device 0   • tiotropium (SPIRIVA HANDIHALER) 18 MCG Cap INHALE 1 CAPSULE VIA HANDIHALER ONCE DAILY AT THE SAME TIME EVERY DAY 30 Cap 3   • albuterol (PROAIR HFA) 108 (90 BASE) MCG/ACT Aero Soln inhalation aerosol Inhale 2 Puffs by mouth every four hours as needed. INHALE 2 PUFFS BY MOUTH EVERY FOUR HOURS AS NEEDED FOR SHORTNESS OF BREATH. 8.5 Inhaler 3   • hydrOXYzine (ATARAX) 25 MG Tab Take 1-2 Tabs by mouth 3 times a day as needed for Itching. 30 Tab 3   • Misc. Devices Misc Continue portable O2 NC with concentrator to use as directed 1 Device 0   • celecoxib (CELEBREX) 100 MG CAPS Take 1 Cap by mouth 2 times a day. 60 Cap 3   • cyclobenzaprine (FLEXERIL) 5 MG tablet Take 1 Tab by mouth 3 times a day. 30 Tab 0   • docusate sodium (COLACE) 100 MG CAPS Take 1 Cap by mouth 2 Times a Day. 60 Cap 0   • gabapentin (NEURONTIN) 300 MG CAPS Take 1 Cap by mouth 3 times a day. 90 Cap 1     No facility-administered encounter medications on file as of 2/2/2017.     Review of Systems   All other systems reviewed and are negative.       Objective:   /78 mmHg  Pulse 68  Ht 1.778 m (5' 10\")  Wt 112.038 kg (247 lb)  BMI 35.44 kg/m2  SpO2 94%    Physical Exam   Constitutional: He is oriented to person, place, and time. He appears well-developed and well-nourished. No distress.   Nasal cannula oxygen   HENT:   Head: Normocephalic and atraumatic.   Mouth/Throat: Oropharynx is clear and moist.   Eyes: Conjunctivae are normal. Pupils are equal, round, and reactive to light. No scleral icterus.   Neck: No JVD present. No tracheal deviation present. No thyromegaly present.   Cardiovascular: Normal rate, regular rhythm, S1 normal, normal heart sounds and intact distal pulses.  PMI is not displaced.  Exam reveals no gallop and no friction rub.    No murmur " heard.  Pulses:       Carotid pulses are 2+ on the right side, and 2+ on the left side.       Radial pulses are 2+ on the right side, and 2+ on the left side.        Dorsalis pedis pulses are 2+ on the right side, and 2+ on the left side.        Posterior tibial pulses are 2+ on the right side, and 2+ on the left side.   Pulmonary/Chest: Effort normal and breath sounds normal. He has no wheezes. He has no rales.   Abdominal: Soft. Bowel sounds are normal. He exhibits no distension and no pulsatile midline mass. There is no tenderness. There is no guarding.   Musculoskeletal: He exhibits no edema.   Neurological: He is alert and oriented to person, place, and time. No cranial nerve deficit.   Skin: Skin is warm and dry. No rash noted. He is not diaphoretic. No erythema.   Psychiatric: He has a normal mood and affect. His behavior is normal. Thought content normal.     EKG (12/15/2016):  I have personally reviewed the EKG this visit and discussed with the patient. It shows sinus bradycardia 55 bpm rightward axis. NORMAL ECG.    Assessment:     1. BOOTH (dyspnea on exertion)  ECHOCARDIOGRAM COMP W/O CONT   2. Obesity (BMI 35.0-39.9 without comorbidity) (HCC)     3. Pulmonary emphysema, unspecified emphysema type (CMS-HCC)     4. Hepatitis C virus infection without hepatic coma, unspecified chronicity     5. Chronic pain syndrome         Medical Decision Making:  Today's Assessment / Status / Plan:     He has no chest pain or exertional symptoms aside from dyspnea which is easily explained by his advanced oxygen-dependent COPD which has been present for many years. He is currently not using his inhalers. He takes no medications for hypertension and in fact has no other cardiac risk factors aside from his smoking. He has a normal resting ECG. He indicates that he would not accept stress test or invasive cardiac evaluation in any case.    I recommend echocardiography and if this is unremarkable routine follow-up with his  other providers. Recommend pulmonary follow-up as scheduled. Baby aspirin.  No routine follow-up with cardiovascular medicine is necessary unless his echocardiogram is abnormal or his symptomology changes.      Thank you for this interesting consultation. It was my pleasure to see Tj Barrios today.        Orion Santana M.D.  [address] [place] [id number]  VIA In Basket

## 2017-02-02 NOTE — MR AVS SNAPSHOT
"        Tj Barrios   2017 4:00 PM   Office Visit   MRN: [id number]    Department:  [facility]   Dept Phone:  [phone]    Description:  Male : 1950   Provider:  Papo Traore M.D.           Reason for Visit     New Patient           Allergies as of 2017     Allergen Noted Reactions    Indocin [Indometacin Sodium] 2012       Severe headaches      You were diagnosed with     BOOTH (dyspnea on exertion)   [706298]       Obesity (BMI 35.0-39.9 without comorbidity) (HCC)   [970423]       Pulmonary emphysema, unspecified emphysema type (CMS-HCC)   [2076285]       Hepatitis C virus infection without hepatic coma, unspecified chronicity   [4174124]       Chronic pain syndrome   [338.4.ICD-9-CM]         Vital Signs     Blood Pressure Pulse Height Weight Body Mass Index Oxygen Saturation    112/78 mmHg 68 1.778 m (5' 10\") 112.038 kg (247 lb) 35.44 kg/m2 94%    Smoking Status                   Never Smoker            Basic Information     Date Of Birth Sex Race Ethnicity Preferred Language    1950 Male White Non- English      Your appointments     2017 11:10 AM   Established Patient with Orion Santana M.D.   [facility] ([facility])    [address] [facility] [id number] [phone]           You will be receiving a confirmation call a few days before your appointment from our automated call confirmation system.            2017 10:15 AM   ECHO with ECHO [facility] EXAM 9   ECHOCARDIOLOGY [facility] ([facility])    [address] [address] [phone]           No prep            2017 12:45 PM   FOLLOW UP with Paop Traore M.D.   [facility] (--)    [address]  [facility] [address] [phone]              Problem List              ICD-10-CM Priority Class Noted - Resolved    COPD (chronic obstructive pulmonary disease) (CMS-HCC) J44.9   2011 - Present    Hepatitis " C B19.20   9/6/2011 - Present    Arthritis M19.90   9/6/2011 - Present    Chronic pain G89.29   9/6/2011 - Present    BPH (benign prostatic hyperplasia) N40.0   9/6/2011 - Present    Mood disorder (CMS-[facility]) F39   9/6/2011 - Present    Swelling R60.9   9/6/2011 - Present    Elevated liver enzymes R74.8   5/30/2012 - Present    Elbow pain M25.529   9/5/2012 - Present    Knee pain M25.569   9/5/2012 - Present    Rib pain R07.81   9/5/2012 - Present    Shoulder pain M25.519   9/10/2012 - Present    Lumbar disc herniation M51.26   12/27/2013 - Present    Back pain, acute M54.9   12/27/2013 - Present    Positive PPD, treated R76.11   4/11/2016 - Present    Cellulitis of right upper extremity L03.113   4/11/2016 - Present    Obesity (BMI 35.0-39.9 without comorbidity) ([facility]) E66.9   12/15/2016 - Present      Health Maintenance        Date Due Completion Dates    IMM DTaP/Tdap/Td Vaccine (1 - Tdap) 1/20/1971 ---    IMM ZOSTER VACCINE 1/20/2012 ---    IMM INFLUENZA (1) 9/1/2016 9/1/2013    IMM PNEUMOCOCCAL 65+ (ADULT) LOW/MEDIUM RISK SERIES (1 of 2 - PCV13) 1/20/2017 1/1/2009    COLONOSCOPY 1/16/2023 1/16/2013 (N/S)    Override on 1/16/2013: (N/S)            Current Immunizations     Influenza TIV (IM) 9/1/2013    Pneumococcal polysaccharide vaccine (PPSV-23) 1/1/2009      Below and/or attached are the medications your provider expects you to take. Review all of your home medications and newly ordered medications with your provider and/or pharmacist. Follow medication instructions as directed by your provider and/or pharmacist. Please keep your medication list with you and share with your provider. Update the information when medications are discontinued, doses are changed, or new medications (including over-the-counter products) are added; and carry medication information at all times in the event of emergency situations     Allergies:  INDOCIN - (reactions not documented)               Medications  Valid as of: February 02,  2017 -  4:23 PM    Generic Name Brand Name Tablet Size Instructions for use    Albuterol Sulfate (Aero Soln) albuterol 108 (90 BASE) MCG/ACT Inhale 2 Puffs by mouth every four hours as needed. INHALE 2 PUFFS BY MOUTH EVERY FOUR HOURS AS NEEDED FOR SHORTNESS OF BREATH.        Celecoxib (Cap) CELEBREX 100 MG Take 1 Cap by mouth 2 times a day.        Cyclobenzaprine HCl (Tab) FLEXERIL 5 MG Take 1 Tab by mouth 3 times a day.        Docusate Sodium (Cap) COLACE 100 MG Take 1 Cap by mouth 2 Times a Day.        Ergocalciferol (Cap) DRISDOL 79034 UNITS TAKE ONE CAPSULE BY MOUTH EVERY 28 DAYS        Gabapentin (Cap) NEURONTIN 300 MG Take 1 Cap by mouth 3 times a day.        HydrOXYzine HCl (Tab) ATARAX 25 MG Take 1-2 Tabs by mouth 3 times a day as needed for Itching.        Methadone HCl (Conc) DOLOPHINE 10 MG/ML Take 5 mg by mouth.        Misc. Devices (Misc) Misc. Devices  Continue portable O2 NC with concentrator to use as directed        Misc. Devices (Misc) Misc. Devices  O2 NC by portable concentrator to use as directed to keep O2 sats > 90%        Tiotropium Bromide Monohydrate (Cap) SPIRIVA 18 MCG INHALE 1 CAPSULE VIA HANDIHALER ONCE DAILY AT THE SAME TIME EVERY DAY        .                 Medicines prescribed today were sent to:     [id number] - [place], [place] - [address] [place] [id number]    Phone: [phone] Fax: [phone]    Open 24 Hours?: No      Medication refill instructions:       If your prescription bottle indicates you have medication refills left, it is not necessary to call your provider’s office. Please contact your pharmacy and they will refill your medication.    If your prescription bottle indicates you do not have any refills left, you may request refills at any time through one of the following ways: The online [employer] system (except Urgent Care), by calling your provider’s office, or by asking your pharmacy to contact your  provider’s office with a refill request. Medication refills are processed only during regular business hours and may not be available until the next business day. Your provider may request additional information or to have a follow-up visit with you prior to refilling your medication.   *Please Note: Medication refills are assigned a new Rx number when refilled electronically. Your pharmacy may indicate that no refills were authorized even though a new prescription for the same medication is available at the pharmacy. Please request the medicine by name with the pharmacy before contacting your provider for a refill.        Your To Do List     Future Labs/Procedures Complete By Expires    ECHOCARDIOGRAM COMP W/O CONT  As directed 2/2/2018         MyChart Status: Patient Declined

## 2017-02-03 NOTE — PROGRESS NOTES
"Subjective:   Tj Barrios is a 67 y.o. male who presents today for initial consultation regarding dyspnea. 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 He has no history of coronary artery disease, hypertension, diabetes, or other cardiovascular risk factors aside from a heavy smoking history. He notes that he became more dyspneic over the past year in concert with a 40 pound weight gain. He also has hepatitis C.    Cine chest discomfort whatsoever with exertional or at rest. The referring notes indicate a \"buzzing sensation\" in his chest. He denies this adamantly. Nonetheless he would refuse a stress test in any form he indicates to me. He is active and dyspneic with activities most likely related to his advanced COPD.    Past Medical History   Diagnosis Date   • COPD (chronic obstructive pulmonary disease) (CMS-HCC) 9/6/2011   • Hepatitis C 9/6/2011   • Arthritis 9/6/2011   • Chronic pain 9/6/2011   • BPH (benign prostatic hyperplasia) 9/6/2011   • Mood disorder (CMS-HCC) 9/6/2011     History reviewed. No pertinent past surgical history.  Family History   Problem Relation Age of Onset   • Cancer Mother    • Heart Disease Mother    • Hypertension Mother    • Arthritis Mother    • Hypertension Father    • Arthritis Father    • Stroke Father      History   Smoking status   • Never Smoker    Smokeless tobacco   • Never Used     Allergies   Allergen Reactions   • Indocin [Indometacin Sodium]      Severe headaches     Outpatient Encounter Prescriptions as of 2/2/2017   Medication Sig Dispense Refill   • methadone (DOLOPHINE) 10 MG/ML Conc Take 5 mg by mouth.     • vitamin D, Ergocalciferol, (DRISDOL) 36852 UNITS Cap capsule TAKE ONE CAPSULE BY MOUTH EVERY 28 DAYS 3 Cap 3   • Misc. Devices Misc O2 NC by portable concentrator to use as directed to keep O2 sats > 90% 1 Device 0   • tiotropium (SPIRIVA HANDIHALER) 18 MCG Cap INHALE 1 CAPSULE VIA HANDIHALER ONCE DAILY AT " "THE SAME TIME EVERY DAY 30 Cap 3   • albuterol (PROAIR HFA) 108 (90 BASE) MCG/ACT Aero Soln inhalation aerosol Inhale 2 Puffs by mouth every four hours as needed. INHALE 2 PUFFS BY MOUTH EVERY FOUR HOURS AS NEEDED FOR SHORTNESS OF BREATH. 8.5 Inhaler 3   • hydrOXYzine (ATARAX) 25 MG Tab Take 1-2 Tabs by mouth 3 times a day as needed for Itching. 30 Tab 3   • Misc. Devices Misc Continue portable O2 NC with concentrator to use as directed 1 Device 0   • celecoxib (CELEBREX) 100 MG CAPS Take 1 Cap by mouth 2 times a day. 60 Cap 3   • cyclobenzaprine (FLEXERIL) 5 MG tablet Take 1 Tab by mouth 3 times a day. 30 Tab 0   • docusate sodium (COLACE) 100 MG CAPS Take 1 Cap by mouth 2 Times a Day. 60 Cap 0   • gabapentin (NEURONTIN) 300 MG CAPS Take 1 Cap by mouth 3 times a day. 90 Cap 1     No facility-administered encounter medications on file as of 2/2/2017.     Review of Systems   All other systems reviewed and are negative.       Objective:   /78 mmHg  Pulse 68  Ht 1.778 m (5' 10\")  Wt 112.038 kg (247 lb)  BMI 35.44 kg/m2  SpO2 94%    Physical Exam   Constitutional: He is oriented to person, place, and time. He appears well-developed and well-nourished. No distress.   Nasal cannula oxygen   HENT:   Head: Normocephalic and atraumatic.   Mouth/Throat: Oropharynx is clear and moist.   Eyes: Conjunctivae are normal. Pupils are equal, round, and reactive to light. No scleral icterus.   Neck: No JVD present. No tracheal deviation present. No thyromegaly present.   Cardiovascular: Normal rate, regular rhythm, S1 normal, normal heart sounds and intact distal pulses.  PMI is not displaced.  Exam reveals no gallop and no friction rub.    No murmur heard.  Pulses:       Carotid pulses are 2+ on the right side, and 2+ on the left side.       Radial pulses are 2+ on the right side, and 2+ on the left side.        Dorsalis pedis pulses are 2+ on the right side, and 2+ on the left side.        Posterior tibial pulses are 2+ " on the right side, and 2+ on the left side.   Pulmonary/Chest: Effort normal and breath sounds normal. He has no wheezes. He has no rales.   Abdominal: Soft. Bowel sounds are normal. He exhibits no distension and no pulsatile midline mass. There is no tenderness. There is no guarding.   Musculoskeletal: He exhibits no edema.   Neurological: He is alert and oriented to person, place, and time. No cranial nerve deficit.   Skin: Skin is warm and dry. No rash noted. He is not diaphoretic. No erythema.   Psychiatric: He has a normal mood and affect. His behavior is normal. Thought content normal.     EKG (12/15/2016):  I have personally reviewed the EKG this visit and discussed with the patient. It shows sinus bradycardia 55 bpm rightward axis. NORMAL ECG.    Assessment:     1. BOOTH (dyspnea on exertion)  ECHOCARDIOGRAM COMP W/O CONT   2. Obesity (BMI 35.0-39.9 without comorbidity) (HCC)     3. Pulmonary emphysema, unspecified emphysema type (CMS-HCC)     4. Hepatitis C virus infection without hepatic coma, unspecified chronicity     5. Chronic pain syndrome         Medical Decision Making:  Today's Assessment / Status / Plan:     He has no chest pain or exertional symptoms aside from dyspnea which is easily explained by his advanced oxygen-dependent COPD which has been present for many years. He is currently not using his inhalers. He takes no medications for hypertension and in fact has no other cardiac risk factors aside from his smoking. He has a normal resting ECG. He indicates that he would not accept stress test or invasive cardiac evaluation in any case.    I recommend echocardiography and if this is unremarkable routine follow-up with his other providers. Recommend pulmonary follow-up as scheduled. Baby aspirin.  No routine follow-up with cardiovascular medicine is necessary unless his echocardiogram is abnormal or his symptomology changes.      Thank you for this interesting consultation. It was my pleasure to  see Tj Barrios today.

## 2017-02-09 NOTE — MR AVS SNAPSHOT
"        Tj Barrios   2017 11:10 AM   Office Visit   MRN: [id number]    Department:  [facility]   Dept Phone:  [phone]    Description:  Male : 1950   Provider:  Orion Santana M.D.           Reason for Visit     Other need for O2      Allergies as of 2017     Allergen Noted Reactions    Indocin [Indometacin Sodium] 2012       Severe headaches      Vital Signs     Blood Pressure Pulse Temperature Respirations Height Weight    130/80 mmHg 80 36.6 °C (97.8 °F) 16 1.778 m (5' 10\") 112.492 kg (248 lb)    Body Mass Index Oxygen Saturation Smoking Status             35.58 kg/m2 95% Never Smoker          Basic Information     Date Of Birth Sex Race Ethnicity Preferred Language    1950 Male White Non- English      Your appointments     2017 10:15 AM   ECHO with ECHO [facility], [facility] EXAM 9   ECHOCARDIOLOGY [facility] ([facility])    [address] [facility] [address]   [phone]           No prep            Mar 09, 2017 12:50 PM   Established Patient with Orion Santana M.D.   [facility]    [address] [facility] [id number] [phone]           You will be receiving a confirmation call a few days before your appointment from our automated call confirmation system.            2017 12:45 PM   FOLLOW UP with Papo Traore M.D.   [facility] (--)    [address], [facility] [address]  [facility] [id number] [phone]              Problem List              ICD-10-CM Priority Class Noted - Resolved    COPD (chronic obstructive pulmonary disease) (CMS-HCC) J44.9   2011 - Present    Hepatitis C B19.20   2011 - Present    Arthritis M19.90   2011 - Present    Chronic pain G89.29   2011 - Present    BPH (benign prostatic hyperplasia) N40.0   2011 - Present    Mood disorder (CMS-HCC) F39   2011 - Present    Swelling R60.9   2011 - Present    Elevated liver enzymes R74.8   2012 - " Present    Elbow pain M25.529   9/5/2012 - Present    Knee pain M25.569   9/5/2012 - Present    Rib pain R07.81   9/5/2012 - Present    Shoulder pain M25.519   9/10/2012 - Present    Lumbar disc herniation M51.26   12/27/2013 - Present    Back pain, acute M54.9   12/27/2013 - Present    Positive PPD, treated R76.11   4/11/2016 - Present    Cellulitis of right upper extremity L03.113   4/11/2016 - Present    Obesity (BMI 35.0-39.9 without comorbidity) ([facility]) E66.9   12/15/2016 - Present      Health Maintenance        Date Due Completion Dates    IMM DTaP/Tdap/Td Vaccine (1 - Tdap) 1/20/1971 ---    IMM ZOSTER VACCINE 1/20/2012 ---    IMM INFLUENZA (1) 9/1/2016 9/1/2013    IMM PNEUMOCOCCAL 65+ (ADULT) LOW/MEDIUM RISK SERIES (1 of 2 - PCV13) 1/20/2017 1/1/2009    COLONOSCOPY 1/16/2023 1/16/2013 (N/S)    Override on 1/16/2013: (N/S)            Current Immunizations     Influenza TIV (IM) 9/1/2013    Pneumococcal polysaccharide vaccine (PPSV-23) 1/1/2009      Below and/or attached are the medications your provider expects you to take. Review all of your home medications and newly ordered medications with your provider and/or pharmacist. Follow medication instructions as directed by your provider and/or pharmacist. Please keep your medication list with you and share with your provider. Update the information when medications are discontinued, doses are changed, or new medications (including over-the-counter products) are added; and carry medication information at all times in the event of emergency situations     Allergies:  INDOCIN - (reactions not documented)               Medications  Valid as of: February 09, 2017 - 11:48 AM    Generic Name Brand Name Tablet Size Instructions for use    Albuterol Sulfate (Aero Soln) albuterol 108 (90 BASE) MCG/ACT Inhale 2 Puffs by mouth every four hours as needed. INHALE 2 PUFFS BY MOUTH EVERY FOUR HOURS AS NEEDED FOR SHORTNESS OF BREATH.        Celecoxib (Cap) CELEBREX 100 MG Take 1  Cap by mouth 2 times a day.        Cyclobenzaprine HCl (Tab) FLEXERIL 5 MG Take 1 Tab by mouth 3 times a day.        Docusate Sodium (Cap) COLACE 100 MG Take 1 Cap by mouth 2 Times a Day.        Ergocalciferol (Cap) DRISDOL 63531 UNITS TAKE ONE CAPSULE BY MOUTH EVERY 28 DAYS        Gabapentin (Cap) NEURONTIN 300 MG Take 1 Cap by mouth 3 times a day.        HydrOXYzine HCl (Tab) ATARAX 25 MG Take 1-2 Tabs by mouth 3 times a day as needed for Itching.        Methadone HCl (Conc) DOLOPHINE 10 MG/ML Take 5 mg by mouth.        Misc. Devices (Misc) Misc. Devices  Continue portable O2 NC with concentrator to use as directed        Misc. Devices (Misc) Misc. Devices  O2 NC by portable concentrator to use as directed to keep O2 sats > 90%        Tiotropium Bromide Monohydrate (Cap) SPIRIVA 18 MCG INHALE 1 CAPSULE VIA HANDIHALER ONCE DAILY AT THE SAME TIME EVERY DAY        .                 Medicines prescribed today were sent to:     [facility]/PHARMACY #[id number] - [place], [place] - [address] [place] [address]    Phone: [phone] Fax: [phone]    Open 24 Hours?: No      Medication refill instructions:       If your prescription bottle indicates you have medication refills left, it is not necessary to call your provider’s office. Please contact your pharmacy and they will refill your medication.    If your prescription bottle indicates you do not have any refills left, you may request refills at any time through one of the following ways: The online [employer] system (except Urgent Care), by calling your provider’s office, or by asking your pharmacy to contact your provider’s office with a refill request. Medication refills are processed only during regular business hours and may not be available until the next business day. Your provider may request additional information or to have a follow-up visit with you prior to refilling your medication.   *Please Note: Medication refills  are assigned a new Rx number when refilled electronically. Your pharmacy may indicate that no refills were authorized even though a new prescription for the same medication is available at the pharmacy. Please request the medicine by name with the pharmacy before contacting your provider for a refill.           MyChart Status: Patient Declined

## 2017-02-09 NOTE — PROGRESS NOTES
"Subjective:      Tj Barrios is a 67 y.o. male who presents with No chief complaint on file.            HPI Comments: Pt with O2 dependent COPD here for an evaluation of his need for O2. He has to use his O2 24/7. His O2 sat with his NC O2 on was at 95% oxygen with O2 on but without O2 he is at 88%.   Patient states without oxygen he is unable to do any of his activities of daily living and cannot ambulate longer than a block without needing to stop and rest.  He has an appointment with a pulmonologist on the 24th of this month. He has been using his inhalers as directed and is not having any issues with his inhalers he will need a refill of his inhaler today as well. We'll continue to follow.    Pt will need a boost of his pneumococcal vaccine due to the severity of his heart and lung disease. Will have pt get immunization today.     Current medications, allergies, and problem list reviewed with patient and updated in EPIC.        Review of Systems   Constitutional: Negative for fever and chills.   HENT: Negative for hearing loss.    Respiratory: Positive for cough and shortness of breath. Negative for sputum production.         Using O2 NC   Cardiovascular: Negative for chest pain and palpitations.   Gastrointestinal: Negative for nausea, vomiting and abdominal pain.   Neurological: Negative for headaches.          Objective:     Filed Vitals:    02/09/17 1135 02/09/17 1143 02/09/17 1146   BP: 130/80     Pulse: 80     Temp: 36.6 °C (97.8 °F)     Resp: 16     Height: 1.778 m (5' 10\")     Weight: 112.492 kg (248 lb)     SpO2: 95% 88% 95%          Physical Exam   Constitutional:   On NC O2   HENT:   Right Ear: External ear normal.   Left Ear: External ear normal.   Nose: Nose normal.   Mouth/Throat: Oropharynx is clear and moist.   Eyes: EOM are normal. Pupils are equal, round, and reactive to light.   Neck: Normal range of motion.   Cardiovascular: Normal rate, regular rhythm and normal heart sounds.  " Exam reveals no friction rub.    No murmur heard.  Pulmonary/Chest: Effort normal. No respiratory distress. He has no wheezes. He has no rales.   Decreased breath sounds    Abdominal: Soft. Bowel sounds are normal. He exhibits no distension. There is no tenderness.               Assessment/Plan:     1. Pulmonary emphysema, unspecified emphysema type (CMS-HCC)  Will have patient continue to use his oxygen as directed. His oxygen desaturated to 88% while on room air and was continuing to lower before being restarted on his oxygen and his oxygen with portable oxygen was 95%. He will follow-up with a pulmonologist on the 24th toes continue to use his medication as directed. Continue to follow. ER precautions have been given if patient should develop any sudden worsening of his symptoms.  - tiotropium (SPIRIVA HANDIHALER) 18 MCG Cap; INHALE 1 CAPSULE VIA HANDIHALER ONCE DAILY AT THE SAME TIME EVERY DAY  Dispense: 30 Cap; Refill: 3    2. Other emphysema (CMS-[facility])  See above plan.    - albuterol (PROAIR HFA) 108 (90 BASE) MCG/ACT Aero Soln inhalation aerosol; Inhale 2 Puffs by mouth every four hours as needed. INHALE 2 PUFFS BY MOUTH EVERY FOUR HOURS AS NEEDED FOR SHORTNESS OF BREATH.  Dispense: 8.5 Inhaler; Refill: 3    3. Streptococcus pneumoniae vaccination indicated  We'll have patient get a call vaccine today due to the severity of his heart and lung disease. Discussed the risks and benefits of getting the vaccine. We'll continue to follow.  - Prevnar 13 PCV-13

## 2017-03-03 NOTE — TELEPHONE ENCOUNTER
----- Message from Papo Traore M.D. sent at 3/1/2017  2:19 PM PST -----  Echo ok except aorta may be enlarged. Needs CTA Thorax to confirm. Please set up. Thanks      ----- Message -----     From: Koffi Painter L.P.N.     Sent: 2/28/2017   4:41 PM       To: Papo Traore M.D.    F/u   7/27/17  jlf           Called and left message for patient to call back regarding above recommendations. [facility]       Patient returns phone call.   Patient is very anxious and states that if he has to have a CTA  ( with contrast ) and IV needs to be done he cannot just have the IV.  He states that his arm swells whenever he has had an IV started.  He states that he would have to  have a ? pic line .    He is asking if this could be why he is short of breath.   Tried to reassure patient but he is extremely anxious.  He does want the testing done.   Above to Dr. Eugenie trinidad        Patient calls back,  He states that he totally overreacted and wants to have the test done.  He states that one out of five times he has had a hard time with the IV's.   Will place order for test.   He still would like Dr. Traore to review if this could cause his shortness of breath especially bending over.  jimi         Dr. Traore reviews and advises OK to skip the CTA, will recheck another echo in one year instead.  Per Dr. Traore this will not contribute to any of his symptoms.   Patient is notified of the same and is reasurred and thankful for the call.    amos

## 2017-04-06 PROBLEM — Z99.81 DEPENDENCE ON SUPPLEMENTAL OXYGEN: Status: ACTIVE | Noted: 2017-01-01

## 2017-04-06 NOTE — MR AVS SNAPSHOT
"        Tj Barrios   2017 10:50 AM   Office Visit   MRN: [id number]    [facility]   Dept Phone:  [phone]    Description:  Male : 1950   Provider:  Orion Santana M.D.           Reason for Visit     Immunizations     Hospital Follow-up           Allergies as of 2017     Allergen Noted Reactions    Indocin [Indometacin Sodium] 2012       Severe headaches      You were diagnosed with     Need for Tdap vaccination   [074317]       Need for pneumococcal vaccine   [676902]       Pulmonary emphysema, unspecified emphysema type (CMS-[facility])   [8286389]       Dependence on supplemental oxygen   [V46.2.ICD-9-CM]         Vital Signs     Blood Pressure Pulse Temperature Respirations Height Weight    120/76 mmHg 60 36.8 °C (98.2 °F) 16 1.778 m (5' 10\") 112.038 kg (247 lb)    Body Mass Index Oxygen Saturation Smoking Status             35.44 kg/m2 95% Never Smoker          Basic Information     Date Of Birth Sex Race Ethnicity Preferred Language    1950 Male White Non- English      Your appointments     2017 12:45 PM   FOLLOW UP with Papo Traore M.D.   [facility] (--)    [address], [facility] [address]  [facility] [address] [phone]              Problem List              ICD-10-CM Priority Class Noted - Resolved    COPD (chronic obstructive pulmonary disease) (CMS-HCC) J44.9   2011 - Present    Hepatitis C B19.20   2011 - Present    Arthritis M19.90   2011 - Present    Chronic pain G89.29   2011 - Present    BPH (benign prostatic hyperplasia) N40.0   2011 - Present    Mood disorder (CMS-HCC) F39   2011 - Present    Swelling R60.9   2011 - Present    Elevated liver enzymes R74.8   2012 - Present    Elbow pain M25.529   2012 - Present    Knee pain M25.569   2012 - Present    Rib pain R07.81   2012 - Present    Shoulder pain M25.519   9/10/2012 - Present    Lumbar disc " herniation M51.26   12/27/2013 - Present    Back pain, acute M54.9   12/27/2013 - Present    Positive PPD, treated R76.11   4/11/2016 - Present    Cellulitis of right upper extremity L03.113   4/11/2016 - Present    Obesity (BMI 35.0-39.9 without comorbidity) ([facility]) E66.9   12/15/2016 - Present    Dependence on supplemental oxygen Z99.81   4/6/2017 - Present      Health Maintenance        Date Due Completion Dates    IMM DTaP/Tdap/Td Vaccine (1 - Tdap) 1/20/1971 ---    IMM ZOSTER VACCINE 1/20/2012 ---    IMM PNEUMOCOCCAL 65+ (ADULT) LOW/MEDIUM RISK SERIES (2 of 2 - PPSV23) 2/9/2018 2/9/2017, 1/1/2009    COLONOSCOPY 1/16/2023 1/16/2013 (N/S)    Override on 1/16/2013: (N/S)            Current Immunizations     13-VALENT PCV PREVNAR 2/9/2017    Influenza TIV (IM) 9/1/2013    Pneumococcal polysaccharide vaccine (PPSV-23)  Incomplete, 1/1/2009    Tdap Vaccine  Incomplete      Below and/or attached are the medications your provider expects you to take. Review all of your home medications and newly ordered medications with your provider and/or pharmacist. Follow medication instructions as directed by your provider and/or pharmacist. Please keep your medication list with you and share with your provider. Update the information when medications are discontinued, doses are changed, or new medications (including over-the-counter products) are added; and carry medication information at all times in the event of emergency situations     Allergies:  INDOCIN - (reactions not documented)               Medications  Valid as of: April 06, 2017 - 11:33 AM    Generic Name Brand Name Tablet Size Instructions for use    Albuterol Sulfate (Aero Soln) albuterol 108 (90 BASE) MCG/ACT Inhale 2 Puffs by mouth every four hours as needed. INHALE 2 PUFFS BY MOUTH EVERY FOUR HOURS AS NEEDED FOR SHORTNESS OF BREATH.        Celecoxib (Cap) CELEBREX 100 MG Take 1 Cap by mouth 2 times a day.        Cyclobenzaprine HCl (Tab) FLEXERIL 5 MG Take 1 Tab by  mouth 3 times a day.        Docusate Sodium (Cap) COLACE 100 MG Take 1 Cap by mouth 2 Times a Day.        Ergocalciferol (Cap) DRISDOL 36872 UNITS TAKE ONE CAPSULE BY MOUTH EVERY 28 DAYS        Gabapentin (Cap) NEURONTIN 300 MG Take 1 Cap by mouth 3 times a day.        HydrOXYzine HCl (Tab) ATARAX 25 MG Take 1-2 Tabs by mouth 3 times a day as needed for Itching.        Methadone HCl (Conc) DOLOPHINE 10 MG/ML Take 5 mg by mouth.        Misc. Devices (Misc) Misc. Devices  Continue portable O2 NC with concentrator to use as directed        Misc. Devices (Misc) Misc. Devices  O2 NC by portable concentrator to use as directed to keep O2 sats > 90%        Tiotropium Bromide Monohydrate (Cap) SPIRIVA 18 MCG INHALE 1 CAPSULE VIA HANDIHALER ONCE DAILY AT THE SAME TIME EVERY DAY        .                 Medicines prescribed today were sent to:     [facility]/PHARMACY #[id number] - [place], [place] - [address] [place] [address]    Phone: [phone] Fax: [phone]    Open 24 Hours?: No      Medication refill instructions:       If your prescription bottle indicates you have medication refills left, it is not necessary to call your provider’s office. Please contact your pharmacy and they will refill your medication.    If your prescription bottle indicates you do not have any refills left, you may request refills at any time through one of the following ways: The online [employer] system (except Urgent Care), by calling your provider’s office, or by asking your pharmacy to contact your provider’s office with a refill request. Medication refills are processed only during regular business hours and may not be available until the next business day. Your provider may request additional information or to have a follow-up visit with you prior to refilling your medication.   *Please Note: Medication refills are assigned a new Rx number when refilled electronically. Your pharmacy may indicate  that no refills were authorized even though a new prescription for the same medication is available at the pharmacy. Please request the medicine by name with the pharmacy before contacting your provider for a refill.           MyChart Status: Patient Declined

## 2017-04-06 NOTE — PROGRESS NOTES
Subjective:      Tj Barrios is a 67 y.o. male who presents with Immunizations and Hospital Follow-up            HPI Comments: Patient here for a recent hospital visit to [place]. He had been having shortness of breath and had been taken to the hospital by ambulance. He normally has oxygen dependent COPD, but he states that his shortness of breath had become significantly worse when he was out doing his daily activities. In the hospital he received a CTA of his chest to assess for a PE. No pulmonary embolism was noted but he was noted to have advanced emphysematous changes including large bulla in the lingula and peripheral fibrotic changes of his right lung. He was also noted to have a 6 mm right upper lobe and 5 mm left upper lobe noncalcified pulmonary nodule.  While at [place] he had been referred to pulmonary for patient, and has a follow-up appointment waiting for him. We'll have him continue to use his medication as directed and also uses oxygen as directed until he is further evaluated. We'll repeat a CT scan or x-ray of his lungs in 6-12 months as recommended by the radiologist. We'll continue to follow. We'll also request hospital records from [place] to further evaluate patient.    Today he is also going to need his Pneumovax 23 as well as a Tdap vaccine. Discussed the risks and benefits of the vaccines. Including site reaction and possible allergies to the vaccines. We'll continue to follow.     Current medications, allergies, and problem list reviewed with patient and updated in EPIC.        Review of Systems   Constitutional: Negative for fever and chills.   HENT: Negative for hearing loss.    Respiratory: Positive for shortness of breath. Negative for cough and sputum production.         On O2   Cardiovascular: Negative for chest pain and palpitations.   Gastrointestinal: Negative for nausea, vomiting and abdominal pain.   Neurological: Negative for headaches.          Objective:  "    /76 mmHg  Pulse 60  Temp(Src) 36.8 °C (98.2 °F)  Resp 16  Ht 1.778 m (5' 10\")  Wt 112.038 kg (247 lb)  BMI 35.44 kg/m2  SpO2 95%     Physical Exam   Constitutional:   NC O2 continuous   HENT:   Right Ear: External ear normal.   Left Ear: External ear normal.   Nose: Nose normal.   Mouth/Throat: Oropharynx is clear and moist.   Eyes: EOM are normal. Pupils are equal, round, and reactive to light.   Neck: Normal range of motion.   Cardiovascular: Normal rate, regular rhythm and normal heart sounds.  Exam reveals no friction rub.    No murmur heard.  Pulmonary/Chest: Effort normal and breath sounds normal. No respiratory distress. He has no wheezes. He has no rales.   Decreased air movement    Abdominal: Soft. Bowel sounds are normal.          Assessment/Plan:      1. Need for Tdap vaccination  Discussed vaccine risks and benefits, pt will get vaccine today. Will continue to follow.  - Tdap =>6yo IM    2. Need for pneumococcal vaccine  Discussed vaccine risks and benefits , pt will get vaccine today. Will continue to follow.  - PneumoVax PPV23 =>3yo    3. Pulmonary emphysema, unspecified emphysema type (CMS-HCC)  Will have him continue to take his inhalers as directed and continue to use his O2 as directed. Also reviewed the results of his recent CTA of his chest. He had been referred to pulmonary and has a follow up coming soon. We'll stop Will continue to follow.    4. Dependence on supplemental oxygen  See above plan.          "

## 2017-08-08 NOTE — PROGRESS NOTES
Subjective:   Tj Barrios is a 67 y.o. male who presents today for initial follow-up regarding dyspnea. 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 He has no history of coronary artery disease, hypertension, diabetes, or other cardiovascular risk factors aside from a heavy smoking history. He notes that he became more dyspneic over the past year in concert with a 40 pound weight gain. He also has hepatitis C.    Since our last visit he has been walking 3 miles per day and has lost significant weight. He is feeling much improved.    Past Medical History   Diagnosis Date   • COPD (chronic obstructive pulmonary disease) (CMS-HCC) 9/6/2011   • Hepatitis C 9/6/2011   • Arthritis 9/6/2011   • Chronic pain 9/6/2011   • BPH (benign prostatic hyperplasia) 9/6/2011   • Mood disorder (CMS-HCC) 9/6/2011     History reviewed. No pertinent past surgical history.  Family History   Problem Relation Age of Onset   • Cancer Mother    • Heart Disease Mother    • Hypertension Mother    • Arthritis Mother    • Hypertension Father    • Arthritis Father    • Stroke Father      History   Smoking status   • Never Smoker    Smokeless tobacco   • Never Used     Allergies   Allergen Reactions   • Indocin [Indometacin Sodium]      Severe headaches     Outpatient Encounter Prescriptions as of 7/26/2017   Medication Sig Dispense Refill   • PROAIR  (90 BASE) MCG/ACT Aero Soln inhalation aerosol INHALE 2 PUFFS EVERY 4 HOURS AS NEEDED FOR SHORTNESS OF BREATH 8.5 Inhaler 3   • tiotropium (SPIRIVA HANDIHALER) 18 MCG Cap INHALE 1 CAPSULE VIA HANDIHALER ONCE DAILY AT THE SAME TIME EVERY DAY 30 Cap 3   • methadone (DOLOPHINE) 10 MG/ML Conc Take 5 mg by mouth.     • vitamin D, Ergocalciferol, (DRISDOL) 55302 UNITS Cap capsule TAKE ONE CAPSULE BY MOUTH EVERY 28 DAYS 3 Cap 3   • Misc. Devices Misc O2 NC by portable concentrator to use as directed to keep O2 sats > 90% 1 Device 0   • hydrOXYzine  "(ATARAX) 25 MG Tab Take 1-2 Tabs by mouth 3 times a day as needed for Itching. 30 Tab 3   • Misc. Devices Misc Continue portable O2 NC with concentrator to use as directed 1 Device 0   • celecoxib (CELEBREX) 100 MG CAPS Take 1 Cap by mouth 2 times a day. 60 Cap 3   • cyclobenzaprine (FLEXERIL) 5 MG tablet Take 1 Tab by mouth 3 times a day. 30 Tab 0   • docusate sodium (COLACE) 100 MG CAPS Take 1 Cap by mouth 2 Times a Day. 60 Cap 0   • gabapentin (NEURONTIN) 300 MG CAPS Take 1 Cap by mouth 3 times a day. 90 Cap 1     No facility-administered encounter medications on file as of 7/26/2017.     Review of Systems   All other systems reviewed and are negative.       Objective:   /80 mmHg  Pulse 60  Ht 1.778 m (5' 10\")  SpO2 94%    Physical Exam   Constitutional: He is oriented to person, place, and time. He appears well-developed and well-nourished. No distress.   Nasal cannula oxygen   HENT:   Head: Normocephalic and atraumatic.   Mouth/Throat: Oropharynx is clear and moist.   Eyes: Conjunctivae are normal. Pupils are equal, round, and reactive to light. No scleral icterus.   Neck: No JVD present. No tracheal deviation present. No thyromegaly present.   Cardiovascular: Normal rate, regular rhythm, S1 normal, normal heart sounds and intact distal pulses.  PMI is not displaced.  Exam reveals no gallop and no friction rub.    No murmur heard.  Pulses:       Carotid pulses are 2+ on the right side, and 2+ on the left side.       Radial pulses are 2+ on the right side, and 2+ on the left side.        Dorsalis pedis pulses are 2+ on the right side, and 2+ on the left side.        Posterior tibial pulses are 2+ on the right side, and 2+ on the left side.   Pulmonary/Chest: Effort normal and breath sounds normal. He has no wheezes. He has no rales.   Abdominal: Soft. Bowel sounds are normal. He exhibits no distension and no pulsatile midline mass. There is no tenderness. There is no guarding.   Musculoskeletal: He " exhibits edema (1+ bilateral lower extremities).   Neurological: He is alert and oriented to person, place, and time. No cranial nerve deficit.   Skin: Skin is warm and dry. No rash noted. He is not diaphoretic. No erythema.   Psychiatric: He has a normal mood and affect. His behavior is normal. Thought content normal.     ECHO CONCLUSIONS (2/28/2017):  Prior echo 09/23/2011  Normal left ventricular chamber size.  Left ventricular ejection fraction is visually estimated to be 65%.  Mild concentric left ventricular hypertrophy.  Grade I diastolic dysfunction.  The aortic valve is not well visualized. Unable to distinguish bicuspid   from tricuspid.  Trace aortic insufficiency.  Ascending aorta is dilated with a diameter of  4.8 cm.  Normal inferior vena cava size and inspiratory collapse.  Compared to the report of the study done - there has been an increase   in the size of the ascending aorta, previously commented on as being   normal.     EKG (12/15/2016):  I have personally reviewed the EKG this visit and discussed with the patient. It shows sinus bradycardia 55 bpm rightward axis. NORMAL ECG.    Assessment:     1. Ascending aorta dilation (CMS-HCC)     2. Pulmonary emphysema, unspecified emphysema type (CMS-HCC)     3. Dyspnea, unspecified type         Medical Decision Making:  Today's Assessment / Status / Plan:     His dyspnea is improving with weight loss diet and exercise. His echocardiogram shows an enlarged ascending aorta and an inability to distinguish a bicuspid from tricuspid aortic valve. Recommend interval follow-up with an echocardiogram and continued aerobic activity.    Thank you for this interesting consultation. It was my pleasure to see Tj Barrios today.

## 2017-10-11 NOTE — PROGRESS NOTES
"Subjective:      Tj Barrios is a 67 y.o. male who presents with Urinary Frequency            Urinary Frequency   This is a new problem. The current episode started more than 1 month ago. The problem occurs daily. The problem has been gradually worsening. Associated symptoms include urinary symptoms. Pertinent negatives include no abdominal pain, anorexia, arthralgias, change in bowel habit, chest pain, chills, congestion, coughing, diaphoresis, fatigue, fever, headaches, joint swelling, myalgias, nausea, neck pain, rash, sore throat, swollen glands, vertigo, visual change, vomiting or weakness. Treatments tried: will order a culture of his urine and start cipro, will also order an ultrasound of his kidneys and PSA.       Review of Systems   Constitutional: Negative for chills, diaphoresis, fatigue and fever.   HENT: Negative for congestion and sore throat.    Respiratory: Negative for cough.    Cardiovascular: Negative for chest pain.   Gastrointestinal: Negative for abdominal pain, anorexia, change in bowel habit, nausea and vomiting.   Musculoskeletal: Negative for arthralgias, joint swelling, myalgias and neck pain.   Skin: Negative for rash.   Neurological: Negative for vertigo, weakness and headaches.          Objective:     /62   Pulse 64   Temp 36.4 °C (97.6 °F)   Resp 20   Ht 1.778 m (5' 10\")   Wt 101.6 kg (224 lb)   SpO2 96%   BMI 32.14 kg/m²      Physical Exam   Constitutional: He is oriented to person, place, and time. He appears well-developed and well-nourished.   On NC O2   HENT:   Head: Normocephalic and atraumatic.   Cardiovascular: Normal rate, regular rhythm and normal heart sounds.  Exam reveals no friction rub.    No murmur heard.  Pulmonary/Chest: Effort normal and breath sounds normal. No respiratory distress. He has no wheezes.   Abdominal: Soft. Bowel sounds are normal. He exhibits no distension. There is no tenderness.   No CVAT   Neurological: He is alert and " oriented to person, place, and time.   Skin: Skin is warm and dry.   Psychiatric: He has a normal mood and affect. His behavior is normal.   Nursing note and vitals reviewed.              Assessment/Plan:     1. Frequency of micturition  Will order a urine culture and start cipro, if he has worsened sxs will have RTC or go to the er.  - PROSTATE SPECIFIC AG DIAGNOSTIC; Future  - URINE CULTURE(NEW); Future  - COMP METABOLIC PANEL; Future  - US-RENAL; Future    2. Benign prostatic hyperplasia with lower urinary tract symptoms, symptom details unspecified  Will have him check a PSA and ultrasound of his kidneys. Will continue to follow.   - PROSTATE SPECIFIC AG DIAGNOSTIC; Future  - URINE CULTURE(NEW); Future  - COMP METABOLIC PANEL; Future  - US-RENAL; Future

## 2017-10-19 NOTE — PROGRESS NOTES
Subjective:      Tj Barrios is a 67 y.o. male who presents with Urinary Frequency and Diarrhea            Pt here for follow up of his increased urinary frequency. He had been started on an antibiotic and a culture was done. His culture did not show any organisms and he did not feel any improvement in his urinary frequency. He has been noticing he has been needing to go more frequently and also urinating in only small amounts. He is requesting to be referred to a urologist today since he has been getting worse and is concerned about prostate cancer. Will refer to urology.    He has been following up with GI for his history of hep C. He had been treated for hep C and has been doing well. His most recent blood work showed no elevation of his liver enzymes and he still needs to get his ultrasound of his liver done. He does not remember when his last colonoscopy was done.  Since being on the antibiotics he has noticed his stool has been softer than normal. If he continues to have changes to his stool will have pt RTC for a test for C diff. Recommended increasing his dietary fibers and staying hydrated. Will continue to follow.     Will have him restart neurontin for his chronic back pain. He has been going to the methadone clinic for his methadone replacement. He states his back has been bothering him more. After stretching his pain seems to be slightly relieved but soon returns. Discussed pain management with pt but pt declined at this time stating he is able to tolerate his pain. Will continue to follow.     Current medications, allergies, and problem list reviewed with patient and updated in EPIC.          Review of Systems   Constitutional: Negative for chills and fever.   HENT: Negative for hearing loss.    Respiratory: Negative for cough, sputum production and shortness of breath.    Cardiovascular: Negative for chest pain and palpitations.   Gastrointestinal: Negative for abdominal pain, nausea and  vomiting.   Musculoskeletal: Positive for back pain.   Neurological: Negative for tingling and headaches.          Objective:     /70   Pulse 60   Temp 36.3 °C (97.3 °F)   Resp 16   Wt 100.7 kg (222 lb)   SpO2 96%   BMI 31.85 kg/m²      Physical Exam   Constitutional: He is oriented to person, place, and time. He appears well-developed and well-nourished.   Using NC O2   HENT:   Head: Normocephalic and atraumatic.   Cardiovascular: Normal rate, regular rhythm and normal heart sounds.  Exam reveals no friction rub.    No murmur heard.  Pulmonary/Chest: Effort normal and breath sounds normal. No respiratory distress. He has no wheezes.   Abdominal: Soft. Bowel sounds are normal. He exhibits no distension. There is no tenderness. There is no guarding.   Musculoskeletal:   Decreased ROM of back in flexion   Neurological: He is alert and oriented to person, place, and time.   Nursing note and vitals reviewed.              Assessment/Plan:     1. H/O hepatitis  Discussed the results of his recent blood work and he still has to get his ultrasound done. He will continue to also follow up with GI for management.    2. Chronic low back pain with sciatica, sciatica laterality unspecified, unspecified back pain laterality  Will have him restart neurontin. Will continue to follow. Discussed pain management for further management.  - gabapentin (NEURONTIN) 100 MG Cap; Take 1 Cap by mouth 3 times a day.  Dispense: 90 Cap; Refill: 3    3. Diarrhea, unspecified type  Will have him stay hydrated and increasing his dietary fibers, if his sxs persist or worsen will order stool studies.    4. Increased frequency of urination  Will refer to urology for further assistance in management of increased urinary frequency.   - REFERRAL TO UROLOGY

## 2017-10-31 NOTE — PROGRESS NOTES
"Subjective:      Tj Barrios is a 67 y.o. male who presents with Diarrhea (3 weeks) and GI Problem            Diarrhea    This is a new problem. The current episode started in the past 7 days. The problem has been gradually worsening. The stool consistency is described as watery. The patient states that diarrhea awakens him from sleep. Associated symptoms include bloating and increased flatus. Pertinent negatives include no abdominal pain, arthralgias, chills, coughing, fever, headaches, myalgias, sweats, URI, vomiting or weight loss. Exacerbated by: had been on cipro. Risk factors include recent antibiotic use. He has tried increased fluids (will order stool studies, and have him continue to stay hydrated and lomotil as needed) for the symptoms. history of hep C       Review of Systems   Constitutional: Negative for chills, fever and weight loss.   Respiratory: Negative for cough.    Gastrointestinal: Positive for bloating, diarrhea and flatus. Negative for abdominal pain and vomiting.   Musculoskeletal: Negative for arthralgias and myalgias.   Neurological: Negative for headaches.          Objective:     /80   Pulse (!) 53   Temp 35.8 °C (96.5 °F)   Resp 20   Ht 1.778 m (5' 10\")   Wt 99.8 kg (220 lb)   SpO2 97%   BMI 31.57 kg/m²      Physical Exam   Constitutional: He is oriented to person, place, and time.   On NC O2   HENT:   Head: Normocephalic and atraumatic.   Congestion  Moist mucous membranes   Eyes: Conjunctivae and EOM are normal. Pupils are equal, round, and reactive to light.   Neck: Normal range of motion. Neck supple.   Cardiovascular: Normal rate, regular rhythm and normal heart sounds.  Exam reveals no friction rub.    No murmur heard.  Pulmonary/Chest: Effort normal and breath sounds normal. No respiratory distress. He has no wheezes.   Abdominal: Soft. Bowel sounds are normal. He exhibits no distension. There is no tenderness.   Lymphadenopathy:     He has no cervical " adenopathy.   Neurological: He is alert and oriented to person, place, and time.   Skin: Skin is warm and dry.   Nursing note and vitals reviewed.              Assessment/Plan:     1. Diarrhea, unspecified type  Will order a C diff and culture. Will have pt stay well hydrated and try lomotil as directed. Will continue to follow. ER precautions given to pt.  - CDIFF BY PCR; Future  - CULTURE STOOL; Future    2. Hepatitis C virus infection without hepatic coma, unspecified chronicity  Will have him continue to follow up with GI as directed.

## 2017-11-02 NOTE — TELEPHONE ENCOUNTER
Phone Number Called: [phone] (home)     Message: patient left message requesting resent lab results. Called patient left message stating results are not in.    Left Message for patient to call back: yes

## 2017-11-06 NOTE — PROGRESS NOTES
"Subjective:      Tj Barrios is a 67 y.o. male who presents with No chief complaint on file.            Pt here for results of his recent stool test for diarrhea. He had recently been on an antibiotic and the diarrhea started after completion of the abx. The results are as follows:    Significant Indicator: NEG  Source: UR    11/1/2017 07:00  [id number] Presumptive: Negative  C Diff by PCR: Negative  [facility]: Negative for Shig...  Significant Indicator: NEG  Site: STOOL  Source: STL    11/1/2017 07:00  [facility]: Negative for Shig...  Significant Indicator: NEG  Site: STOOL  Source: STL    Since his cultures and C diff are negative no antibiotics will be started and will have him continue to stay hydrated and use antimotility medications as needed. He will also continue to follow up with his GI providers as directed. He will need a note today to be able to return to his group sessions. Will continue to follow.      Current medications, allergies, and problem list reviewed with patient and updated in EPIC.          Review of Systems   Constitutional: Negative for chills and fever.   HENT: Negative for hearing loss.    Respiratory: Negative for cough, sputum production and shortness of breath.    Cardiovascular: Negative for chest pain and palpitations.   Gastrointestinal: Positive for diarrhea. Negative for abdominal pain, nausea and vomiting.   Musculoskeletal: Positive for back pain.   Neurological: Positive for tingling. Negative for headaches.          Objective:     Vitals:    11/06/17 0826   BP: 125/80   Pulse: 80   Resp: 20   Temp: 36.5 °C (97.7 °F)   SpO2: 95%   Weight: 101.6 kg (224 lb)   Height: 1.778 m (5' 10\")         Physical Exam   Constitutional: He is oriented to person, place, and time. He appears well-developed and well-nourished.   On NC O2   HENT:   Head: Normocephalic and atraumatic.   Cardiovascular: Normal rate, regular rhythm and normal heart sounds.  Exam reveals no friction rub.    No " murmur heard.  Pulmonary/Chest: Effort normal and breath sounds normal. No respiratory distress. He has no wheezes.   Abdominal: Soft. Bowel sounds are normal. He exhibits no distension. There is no tenderness.   Neurological: He is alert and oriented to person, place, and time.   Skin: Skin is warm and dry.   Psychiatric: He has a normal mood and affect. His behavior is normal.   Nursing note and vitals reviewed.              Assessment/Plan:     1. Diarrhea, unspecified type  Reviewed the results of his recent tests. Will have him continue to stay hydrated, note written to return to his activities and will have him follow up with GI as needed.

## 2017-11-06 NOTE — LETTER
November 6, 2017       Patient: Tj Barrios   YOB: 1950   Date of Visit: 11/6/2017         To Whom It May Concern:    It is my medical opinion that Tj Barrios return to his normal activities his test returned as normal..    If you have any questions or concerns, please don't hesitate to call [phone]          Sincerely,          Orion Santana M.D.  Electronically Signed

## 2017-11-13 NOTE — TELEPHONE ENCOUNTER
Was the patient seen in the last year in this department? Yes     Does patient have an active prescription for medications requested? Yes     Received Request Via: Pharmacy   Future Appointments       Provider Department [place]    11/14/2017 9:00 AM [address] [facility] - [address]    11/14/2017 9:30 AM [address] [facility] - [facility]TR[facility] - [address]    11/21/2017 9:10 AM Orion Santana M.D. [facility]    1/30/2018 12:45 PM Papo Traore M.D. [facility]

## 2017-11-21 NOTE — PROGRESS NOTES
"Subjective:      Tj Barrios is a 67 y.o. male who presents with Follow-Up (us)            Patient here for follow-up of his recent ultrasounds of his abdomen. Results of his liver and biliary ultrasound are as follows:    1.  Solid mass in upper abdomen near region of pancreatic head is noted measuring 3.6 x 4.2 x 4.7 cm. Malignant lymph node enlargement is a possibility. Enlarged nodes were seen on prior CT. Malignancy arising from pancreas is also possible. Dedicated CT   pancreas recommended for further evaluation.    2.  Sludge and gallstones are noted in the gallbladder.    3.  There is dilatation of the common bile duct. Intrahepatic ductal dilatation is also noted.    Due to the presence of a mass near his pancreas a CT scan of his abdomen has also been ordered. Once this test is done he will reschedule to review the results of the scan. He'll continue to follow-up with his gastroenterologist for further evaluation of his liver issues as well as liver mass. We'll continue to follow.    The ultrasound of his kidneys were within normal limits.    Normal renal ultrasound.    He has an appt with urology today for his urinary frequency and hesitancy. Will continue to follow.         Review of Systems   Constitutional: Negative for chills and fever.   HENT: Negative for hearing loss and tinnitus.    Respiratory: Negative for cough, sputum production and shortness of breath.         On NC O2   Cardiovascular: Negative for chest pain and palpitations.   Gastrointestinal: Positive for diarrhea. Negative for abdominal pain, nausea and vomiting.   Skin:        jaundice   Neurological: Positive for tingling.          Objective:     /68   Pulse 80   Temp 36.2 °C (97.2 °F)   Ht 1.778 m (5' 10\")   Wt 98.9 kg (218 lb)   SpO2 88%   BMI 31.28 kg/m²      Physical Exam   Constitutional: He is oriented to person, place, and time.   BMI 31   HENT:   Head: Normocephalic and atraumatic.   Cardiovascular: " Normal rate, regular rhythm and normal heart sounds.  Exam reveals no friction rub.    No murmur heard.  Pulmonary/Chest: Effort normal and breath sounds normal. No respiratory distress. He has no wheezes. He has no rales.   Abdominal: Soft. Bowel sounds are normal. He exhibits distension.   Neurological: He is alert and oriented to person, place, and time.   Skin: Skin is warm and dry.   jaundice   Psychiatric: He has a normal mood and affect. His behavior is normal.   Nursing note and vitals reviewed.              Assessment/Plan:     1. Chronic hepatitis C without hepatic coma (CMS-HCC)  Reviewed the results of his recent ultrasound, a CT of his liver and pancreas has been ordered. Will have him continue to follow up with GI for a further evaluation. Will continue to follow.     2. Mass of pancreas  See above plan.    3. Jaundice  See above plan.

## 2017-12-06 PROBLEM — E87.1 HYPONATREMIA: Status: ACTIVE | Noted: 2017-01-01

## 2017-12-06 PROBLEM — J96.11 CHRONIC RESPIRATORY FAILURE WITH HYPOXIA (HCC): Status: ACTIVE | Noted: 2017-01-01

## 2017-12-06 PROBLEM — C25.9 PANCREATIC CANCER (HCC): Status: ACTIVE | Noted: 2017-01-01

## 2017-12-06 PROBLEM — K83.1 OBSTRUCTIVE JAUNDICE: Status: ACTIVE | Noted: 2017-01-01

## 2017-12-06 PROBLEM — D64.9 NORMOCYTIC ANEMIA: Status: ACTIVE | Noted: 2017-01-01

## 2017-12-06 PROBLEM — E80.6 HYPERBILIRUBINEMIA: Status: ACTIVE | Noted: 2017-01-01

## 2017-12-06 NOTE — ED PROVIDER NOTES
ED Provider Note    ED Provider Note    Primary care provider: Orion Santana M.D.  Means of arrival: EMS  History obtained from: Patient  History limited by: None    CHIEF COMPLAINT  Chief Complaint   Patient presents with   • Abdominal Pain     mid upper gastric   • Jaundice     X2 weeks; patient has seen PCP for this       HPI  Tj Barrios is a 67 y.o. male who [facility] with a chief complaint of epigastric and upper quadrant abdominal pain. Patient states the pain started yesterday at 4 PM, approximately 10 hours ago. Patient is in the process of being worked up for a pancreatic mass. This was then covered in November of this year. He is scheduled for a procedure on December 15 to have a stent placed in his common bile duct, by [facility]. Patient reports that he has had diarrhea since September 28 as well as nausea since that date. No vomiting. No fever. Until 3 days ago, he was having chronic diarrhea now it had no bowel movement for 3 days. Patient has a history of hepatitis C which was treated. He also has a history of heroin addiction however, this is remote, he's been on the down for many years now. During the course of his diarrhea over the last few months, he's been worked up multiple times for Clostridium difficile. It's been negative each time. Patient reports that he presented to me at [facility] last week with jaundice but was not having pain at that time and he was instructed to follow-up with his primary care doctor. He does report that he was supposed to have a CT scan but they were unable to establish an IV so that was never done. Patient does report that he's been jaundiced for some time but it does appear to be getting worse and now he reports that he's itchy with dry skin which is relatively new.    Patient's been seen by the mid-level provider, Glynn [facility]    REVIEW OF SYSTEMS  Review of Systems  "  Constitutional: Negative for fever.   HENT: Negative for congestion.    Respiratory: Negative for shortness of breath.    Cardiovascular: Negative for chest pain.   Gastrointestinal: Positive for abdominal pain, constipation, diarrhea and nausea. Negative for blood in stool and vomiting.   Genitourinary: Negative for dysuria and flank pain.        Discolored urine   Skin: Positive for itching and rash.   Neurological: Negative for dizziness.   All other systems reviewed and are negative.      PAST MEDICAL HISTORY   has a past medical history of Arthritis (9/6/2011); BPH (benign prostatic hyperplasia) (9/6/2011); Chronic pain (9/6/2011); COPD (chronic obstructive pulmonary disease) (CMS-HCC) (9/6/2011); Hepatitis C (9/6/2011); and Mood disorder (CMS-HCC) (9/6/2011).    SURGICAL HISTORY  patient denies any surgical history    SOCIAL HISTORY  Social History   Substance Use Topics   • Smoking status: Never Smoker   • Smokeless tobacco: Never Used   • Alcohol use No      History   Drug Use No       FAMILY HISTORY  Family History   Problem Relation Age of Onset   • Cancer Mother    • Heart Disease Mother    • Hypertension Mother    • Arthritis Mother    • Hypertension Father    • Arthritis Father    • Stroke Father        CURRENT MEDICATIONS  Home Medications    **Home medications have not yet been reviewed for this encounter**         ALLERGIES  Allergies   Allergen Reactions   • Indocin [Indometacin Sodium]      Severe headaches       PHYSICAL EXAM  VITAL SIGNS: /84   Pulse 69   Temp 37.2 °C (98.9 °F)   Resp 19   Ht 1.778 m (5' 10\")   Wt 99.8 kg (220 lb)   SpO2 99%   BMI 31.57 kg/m²   Vitals reviewed.  Constitutional: Patient is oriented to person, place, and time. Appears well-developed and well-nourished.Mild distress.  anxious.  Head: Normocephalic and atraumatic.   Ears: Normal external ears bilaterally.   Mouth/Throat: Oropharynx is clear and moist, no exudates.  there is icterus to the floor of the " mouth  Eyes: Conjunctivae are icteric. Pupils are equal, round, and reactive to light.   Neck: Normal range of motion. Neck supple.  Cardiovascular: Normal rate, regular rhythm and normal heart sounds. Normal peripheral pulses.  Pulmonary/Chest: Effort normal and breath sounds normal. No respiratory distress, no wheezes, rhonchi, or rales. No chest wall tenderness.  Abdominal: Soft. Bowel sounds are normal. There is epigastric and bilateral upper quadrant tenderness. No lower quadrant tenderness. No rebound or guarding, or peritoneal signs. No CVA tenderness.  Musculoskeletal: Bilateral edema and no tenderness.   Lymphadenopathy: No cervical adenopathy.   Neurological: No focal deficits.   Skin: Skin is warm and dry. No erythema. No pallor. Severe jaundiced.  Psychiatric: Patient has a normal mood and affect, given circumstances, he is tearful, anxious and fearful..     LABS  Results for orders placed or performed during the hospital encounter of 12/06/17   CBC WITH DIFFERENTIAL   Result Value Ref Range    WBC 8.1 4.8 - 10.8 K/uL    RBC 3.32 (L) 4.70 - 6.10 M/uL    Hemoglobin 10.5 (L) 14.0 - 18.0 g/dL    Hematocrit 29.1 (L) 42.0 - 52.0 %    MCV 87.7 81.4 - 97.8 fL    MCH 31.6 27.0 - 33.0 pg    MCHC 36.1 (H) 33.7 - 35.3 g/dL    RDW 77.1 (H) 35.9 - 50.0 fL    Platelet Count 220 164 - 446 K/uL    MPV 11.2 9.0 - 12.9 fL    Neutrophils-Polys 84.30 (H) 44.00 - 72.00 %    Lymphocytes 4.80 (L) 22.00 - 41.00 %    Monocytes 8.60 0.00 - 13.40 %    Eosinophils 0.10 0.00 - 6.90 %    Basophils 0.70 0.00 - 1.80 %    Immature Granulocytes 1.50 (H) 0.00 - 0.90 %    Nucleated RBC 0.00 /100 WBC    Lymphs (Absolute) 0.39 (L) 1.00 - 4.80 K/uL    Monos (Absolute) 0.70 0.00 - 0.85 K/uL    Eos (Absolute) 0.01 0.00 - 0.51 K/uL    Baso (Absolute) 0.06 0.00 - 0.12 K/uL    Immature Granulocytes (abs) 0.12 (H) 0.00 - 0.11 K/uL    NRBC (Absolute) 0.00 K/uL    Neutrophils (Absolute) 6.84 1.82 - 7.42 K/uL   COMP METABOLIC PANEL   Result Value Ref  Range    Sodium 133 (L) 135 - 145 mmol/L    Potassium 3.8 3.6 - 5.5 mmol/L    Chloride 101 96 - 112 mmol/L    Co2 21 20 - 33 mmol/L    Anion Gap 11.0 0.0 - 11.9    Glucose 123 (H) 65 - 99 mg/dL    Bun 11 8 - 22 mg/dL    Creatinine 0.98 0.50 - 1.40 mg/dL    Calcium 8.7 8.5 - 10.5 mg/dL    AST(SGOT) 102 (H) 12 - 45 U/L    ALT(SGPT) 71 (H) 2 - 50 U/L    Alkaline Phosphatase 353 (H) 30 - 99 U/L    Albumin 3.2 3.2 - 4.9 g/dL    Total Protein 6.2 6.0 - 8.2 g/dL    Globulin 3.0 1.9 - 3.5 g/dL    A-G Ratio 1.1 g/dL    Total Bilirubin 38.5 (H) 0.1 - 1.5 mg/dL   LIPASE   Result Value Ref Range    Lipase 158 (H) 11 - 82 U/L   ESTIMATED GFR   Result Value Ref Range    GFR If African American >60 >60 mL/min/1.73 m 2    GFR If Non African American >60 >60 mL/min/1.73 m 2   PERIPHERAL SMEAR REVIEW   Result Value Ref Range    Peripheral Smear Review see below    PLATELET ESTIMATE   Result Value Ref Range    Plt Estimation Normal    MORPHOLOGY   Result Value Ref Range    RBC Morphology Present     Large Platelets 1+     Poikilocytosis 3+     Target Cells 3+    DIFFERENTIAL COMMENT   Result Value Ref Range    Comments-Diff see below    EKG (ER)   Result Value Ref Range    Report       [facility] Emergency Dept.    Test Date:  2017  Pt Name:    HENOK CHAN               Department: ER  MRN:        [id number]                      Room:       [facility]  Gender:     M                            Technician: [id number]  :        1950                   Requested By:ER TRIAGE PROTOCOL  Order #:    [id number]                    Reading MD:    Measurements  Intervals                                Axis  Rate:       74                           P:          67  AZ:         176                          QRS:        -48  QRSD:       100                          T:          55  QT:         408  QTc:        453    Interpretive Statements  SINUS RHYTHM  CONSIDER LEFT ATRIAL ABNORMALITY  LEFT ANTERIOR FASCICULAR BLOCK  EARLY  PRECORDIAL R/S TRANSITION  No previous ECG available for comparison         All labs reviewed by me.    RADIOLOGY  CT-CHEST,ABDOMEN,PELVIS WITH   Final Result            1.  5 cm mass in the head of the pancreas causing obstruction of the common bile duct and marked intrahepatic biliary ductal dilatation suspicious for pancreatic carcinoma. Further there is evidence of peripancreatic and periportal adenopathy.    Additionally there are several small low-attenuation lesions in the right lobe of the liver which may represent metastatic disease.      2.  Multiple small bulla throughout the lung fields with an exceptionally large bullous lesion in the left lower lobe which measures 17 cm in greatest diameter.      3.  Extensive coronary artery disease.        The radiologist's interpretation of all radiological studies have been reviewed by me.    COURSE & MEDICAL DECISION MAKING  Pertinent Labs & Imaging studies reviewed. (See chart for details)    Obtained and reviewed past medical records. Patient's most recent encounter, was for follow-up. He went over a recently done ultrasound with his primary care doctor, Dr. Dariusz sanchez. It was a solid mass noted in the upper abdomen near the region of the pancreas which was 3.6 x 4.2 x 4.76 m, with a suspected malignant lymph node. CT was recommended. There was sludge and gallstones noted in the gallbladder. There is common bile duct dilatation with intrahepatic ductal dilatation as well. This was noted from November 21. Patient's last set of labs in the computer are from August of this year hemoglobin was 15 and 45. Platelet count low 151. Chemistry at that time was quite unremarkable. Creatinine was 0.9. LFTs were normal including a normal total bilirubin of 0.6.    1:27 AM - Patient seen and examined at bedside. Patient having epigastric and bilateral upper quadrant pain. Up to this point, despite his health feeling, he has not experienced pain. He is also highly concerned  about not having had a bowel movement for the last 3 days after having had weeks of diarrhea. He severely jaundiced. Patient will be treated with pain medication, he'll be kept nothing by mouth. Ordered CBC, chemistry as well as a CT chest and abdomen to evaluate his symptoms. Patient's labs were remarkably normal, in August of this year. The differential diagnoses include but are not limited to: Metastatic disease - liver and/or pancreatic cancer, bowel obstruction, constipation. Discussed with the patient on initial evaluation, that would plan for admission to the hospital for further evaluation and palliative measures if possible. It has been discussed with him in the past, that he likely does have either pancreatic and or liver cancer.    4:40 AM, patient's reevaluated. He does report that he is feeling better. Pain down from a 10 to a 6-1/2 or 7. We discussed CT findings chest read demonstrate, a pancreatic mass as well as abnormal findings in the liver which patient was aware of from previous ultrasound. We also discussed labs. The fact that they were quite normal in August and now quite abnormal, particularly his total bilirubin.    4:56 AM discussed with Dr. Alanis, on-call for GI. He recommends that the hospitalist call them later. Apparently, the equipment that needed for common bile duct stenting is not available at this hospital may would have to transfer the patient to [facility]. Will update hospitalist on this.     5:30 AM, Dr. Braga, updated on plan. He is aware of my conversation with Dr. Alanis He will touch base with the GI service later today to see whether this patient needs to be discharged to have the endoscopic ultrasound scheduled at [facility] later to have the patient transferred there.    Patient admitted in guarded condition.    FINAL IMPRESSION  1. Jaundice    2. Hyperbilirubinemia    3. Pancreatic mass

## 2017-12-06 NOTE — CONSULTS
"Reason for PC Consult: Advance Care Planning    Consulted by: Dr. Braga; Hosp- Currently Dr. Morgan    Assessment:  General: 68 y/o admitted with abdominal pain and jaundice x2 weeks. Diagnosed with metastatic pancreatic cancer \"recently\" but patient unable to recall exactly when. Had plan for stent placement next week but pain became unbearable at home. PMHx of Hep C (1971), COPD, BPH, chronic pain    Consults: GI, oncology requested    Dyspnea: Yes- on chronic O2 at home  Last BM:  -    Pain: Yes- abdominal; ranging 7-10 with movement and at rest  Depression: Yes-    Dementia: No;       Spiritual:  Is Restorationism or spirituality important for coping with this illness? No-    Has a  or spiritual provider visit been requested? No    Palliative Performance Scale: 60%    Advance Directive: None- completed today  DPOA: No- new AD appointing Rebecca ROJAS: No- initiated today    Code Status: DNR-      Outcome:  PC RN met with Tj at bedside and explained the role of PC. He lives here in [place] but has never been  and has no kids. He has a brother who is 16 years younger and lives in [place], but states \"We don't really have a relationship. He's doing good and doesn't need any of this.\" He reports that both parents have passed and he has no other relatives. He has a close friend Rebecca Cardona who he trusts with his affairs. He expressed understanding of his diagnosis and plan for stent next week. He stated he did not speak to an oncologist because \"I doubt they would give me anything.\" PC inquired about his desire to receive treatment if there are options and he was undecided, but open to hearing what the MDs had to say. PC RN discussed the role of PC vs hospice for pain management and ongoing care. PC discussed hospice at length and he was agreeable to their help if treatment wasn't pursued. Tj appeared very insightful and at ease when discussing his condition and POC. PC RN offered to have the PC MD " evaluate his situation for assistance with pain control, which he was open to as well.    AD and POLST were also explained and Tj completed the AD appointing Rebecca and selecting options 2, 3, and 5. The POLST was initiated reflecting his DNR code status but not completed until his plan was better determined. Certificate faxed to [facility]. Tj was grateful for the visit and support.    While talking to Tj, PC RN provided active listening, normalization, validation of thoughts and feelings, as well as reinforced his goals of care. PC contact information provided to ron and Rebecca and encouraged to call with any questions or concerns.     Updated: BS team/PC MD    Plan: further discussion with MD regarding plan for oncology consult or not; possible hospice if treatment not pursued    Recommendations: I recommend a hospice consult. -pending further discussion with MD re oncology treatment     Thank you for allowing Palliative Care to participate in this patient's care. Please feel free to call [phone] with any questions or concerns.

## 2017-12-06 NOTE — ASSESSMENT & PLAN NOTE
Advanced, likely with metastasis to the liver as seen on CT imaging.  He does have a hx of HCC and has been treated/followed by GI.  Scan in 6/2017 showed reactive peripancreatic lymph notes which could have suggested CA at that time.  Patient at this point would like to answer questions re: chemotherapy options but is leaning towards comfort care  Going to [facility] for EUS today for stent (palliative), possible celaic block and biopsy  Pain control with oxycodone and Dilaudid  Methadone 100  Palliative care is following, discussed case w them

## 2017-12-06 NOTE — PROGRESS NOTES
Per pt, he gets methadone through [facility] #[phone]. RN called facility, verified with CHANCE Rain, pt takes 100mg methadone daily for pain. MD notified.

## 2017-12-06 NOTE — PROGRESS NOTES
Please see the H&P Dictated After Midnight for full details    Interval Progress Note:  Patient seen and examined    67M w hx of hep C s/p treatment ([facility]), chronic resp failure with hypoxia due to COPD, admitted with worsening abdominal pain found to have 5cm pancreatic mass w obstruction.  Has been followed by [facility] for this and had plans for stent placement next week.    Plan:  -Consulted [facility] ([facility]) who will eval the patient for possible stent and ?biopsy  -If this were to happen, he would need transfer to [facility] for EUS  -Increase dilaudid  -Resume home methadone 100    35 minutes were spent discussing plan and goals of care with patient and his friend at bedside.  Discussed possible EUS and biopsy vs aiming for comfort.  Patient is leaning towards comfort care /hospice but wants to get biopsy and eval options for treatment.  This was in addition to the time of the initial visit with the patient for total time of 65min.  All questions answered.  Greater than 50% of this time was at the patient's bedside.    Brenda Morgan DO

## 2017-12-06 NOTE — ED NOTES
"Pt BIB EMS for upper abd pain that started around 1600 described as sharp/stabbing; pt has obvious severe jaundice; reports having an appt at [facility] to differentiate whether he has pancreatic or liver cancer or both. Patient given 100 fentanyl intranasal PTA. A&O X4. VSS at this time. Patient in mild distress due to pain at this time.     /84   Pulse 76   Temp 37.2 °C (98.9 °F)   Resp (!) 22   Ht 1.778 m (5' 10\")   Wt 99.8 kg (220 lb)   SpO2 97%   BMI 31.57 kg/m²     "

## 2017-12-06 NOTE — ASSESSMENT & PLAN NOTE
Secondary to obstructive pancreatic cancer, marked bili elevation >38 (down to 32 today)  GI to place stent  F/U biopsy results

## 2017-12-06 NOTE — H&P
Hospital Medicine History and Physical    Date of Service  12/6/2017    Chief Complaint  Chief Complaint   Patient presents with   • Abdominal Pain     mid upper gastric   • Jaundice     X2 weeks; patient has seen PCP for this       History of Presenting Illness  67 y.o. male With a past medical history of hepatitis C who presented 12/6/2017 with epigastric pain for the past one day. Pain is dull in nature and constant. It is rated at 8 out of 10 in intensity. Pain does not radiate and is not associated with nausea or vomiting. Patient reports that he has had chronic diarrhea since September 28 2017. He tested negative for C. diff ×3. He underwent an abdominal ultrasound which revealed a pancreatic mass suspicious for pancreatic cancer. Patient has been evaluated by GI for pancreatic mass and was due to get a biliary stent placed. She reports worsening jaundice is associated with itching for the past one week. She reports that he has not had a bowel movement for the past 3 days. Patient denies any chest pain, fever, nausea, vomiting or dysuria.      Primary Care Physician  Orion Santana M.D.    Consultants  GI     Code Status  DNR    Review of Systems  Review of Systems   Constitutional: Positive for malaise/fatigue.   Gastrointestinal: Positive for abdominal pain and constipation. Negative for blood in stool, nausea and vomiting.   Genitourinary: Negative for dysuria.   Skin: Positive for itching.   Neurological: Positive for weakness.   All other systems reviewed and are negative.       Past Medical History  Past Medical History:   Diagnosis Date   • COPD (chronic obstructive pulmonary disease) (CMS-HCC) 9/6/2011   • Hepatitis C 9/6/2011   • Arthritis 9/6/2011   • Chronic pain 9/6/2011   • BPH (benign prostatic hyperplasia) 9/6/2011   • Mood disorder (CMS-[facility]) 9/6/2011       Surgical History  No pertinent surgical history  Medications  No current facility-administered medications on file prior to  encounter.      Current Outpatient Prescriptions on File Prior to Encounter   Medication Sig Dispense Refill   • diphenoxylate-atropine (LOMOTIL) 2.5-0.025 MG Tab TAKE 1 TABLET BY MOUTH 4 TIMES A DAY AS NEEDED FOR DIARRHEA 30 Tab 0   • gabapentin (NEURONTIN) 100 MG Cap Take 1 Cap by mouth 3 times a day. 90 Cap 3   • vitamin D, Ergocalciferol, (DRISDOL) 83389 units Cap capsule TAKE ONE CAPSULE BY MOUTH EVERY 28 DAYS 3 Cap 3   • tiotropium (SPIRIVA HANDIHALER) 18 MCG Cap INHALE 1 CAPSULE VIA HANDIHALER ONCE DAILY AT THE SAME TIME EVERY DAY 30 Cap 3   • methadone (DOLOPHINE) 10 MG/ML Conc Take 5 mg by mouth.     • Misc. Devices Misc O2 NC by portable concentrator to use as directed to keep O2 sats > 90% 1 Device 0   • hydrOXYzine (ATARAX) 25 MG Tab Take 1-2 Tabs by mouth 3 times a day as needed for Itching. 30 Tab 3   • Misc. Devices Misc Continue portable O2 NC with concentrator to use as directed 1 Device 0   • celecoxib (CELEBREX) 100 MG CAPS Take 1 Cap by mouth 2 times a day. 60 Cap 3   • cyclobenzaprine (FLEXERIL) 5 MG tablet Take 1 Tab by mouth 3 times a day. 30 Tab 0   • docusate sodium (COLACE) 100 MG CAPS Take 1 Cap by mouth 2 Times a Day. 60 Cap 0       Family History  Family History   Problem Relation Age of Onset   • Cancer Mother    • Heart Disease Mother    • Hypertension Mother    • Arthritis Mother    • Hypertension Father    • Arthritis Father    • Stroke Father        Social History  Social History   Substance Use Topics   • Smoking status: Never Smoker   • Smokeless tobacco: Never Used   • Alcohol use No       Allergies  Allergies   Allergen Reactions   • Indocin [Indometacin Sodium]      Severe headaches        Physical Exam  Laboratory   Hemodynamics  Temp (24hrs), Av.2 °C (98.9 °F), Min:37.2 °C (98.9 °F), Max:37.2 °C (98.9 °F)   Temperature: 37.2 °C (98.9 °F)  Pulse  Av.4  Min: 67  Max: 80 Heart Rate (Monitored): 60  Blood Pressure : 124/84, NIBP: 115/66      Respiratory      Respiration:  20, Pulse Oximetry: 98 %             Physical Exam   Constitutional: He is oriented to person, place, and time. He appears well-developed and well-nourished. No distress.   Jaundiced   HENT:   Head: Normocephalic and atraumatic.   Mouth/Throat: Oropharynx is clear and moist.   Eyes: Conjunctivae are normal. Pupils are equal, round, and reactive to light.   Neck: Neck supple.   Cardiovascular: Normal rate and regular rhythm.    Pulmonary/Chest: Effort normal and breath sounds normal. No respiratory distress. He has no wheezes. He has no rales.   Abdominal: He exhibits distension. There is tenderness. There is no rebound.   Musculoskeletal: Normal range of motion. He exhibits no edema or tenderness.   Neurological: He is alert and oriented to person, place, and time. No cranial nerve deficit. Coordination normal.   Skin: Skin is warm and dry.   Psychiatric: He has a normal mood and affect. His behavior is normal.       Recent Labs      12/06/17   0136   WBC  8.1   RBC  3.32*   HEMOGLOBIN  10.5*   HEMATOCRIT  29.1*   MCV  87.7   MCH  31.6   MCHC  36.1*   RDW  77.1*   PLATELETCT  220   MPV  11.2     Recent Labs      12/06/17   0136   SODIUM  133*   POTASSIUM  3.8   CHLORIDE  101   CO2  21   GLUCOSE  123*   BUN  11   CREATININE  0.98   CALCIUM  8.7     Recent Labs      12/06/17   0136   ALTSGPT  71*   ASTSGOT  102*   ALKPHOSPHAT  353*   TBILIRUBIN  38.5*   LIPASE  158*   GLUCOSE  123*     Recent Labs      12/06/17   0136   INR  1.74*             No results found for: TROPONINI  Urinalysis:  No results found for: SPECGRAVITY, GLUCOSEUR, KETONES, NITRITE, WBCURINE, RBCURINE, BACTERIA, EPITHELCELL     Imaging  CT-CHEST,ABDOMEN,PELVIS WITH   Final Result            1.  5 cm mass in the head of the pancreas causing obstruction of the common bile duct and marked intrahepatic biliary ductal dilatation suspicious for pancreatic carcinoma. Further there is evidence of peripancreatic and periportal adenopathy.    Additionally  there are several small low-attenuation lesions in the right lobe of the liver which may represent metastatic disease.      2.  Multiple small bulla throughout the lung fields with an exceptionally large bullous lesion in the left lower lobe which measures 17 cm in greatest diameter.      3.  Extensive coronary artery disease.           Assessment/Plan     I anticipate this patient will require at least two midnights for appropriate medical management, necessitating inpatient admission.    Pancreatic cancer (CMS-HCC)- (present on admission)   Assessment & Plan    Advanced, likely with metastasis to the liver as seen on CT imaging  Patient at this point would not like to undergo any chemotherapy  Patient is open to stent placement for palliation, GI has been consulted  Pain control with oxycodone and Dilaudid  Palliative care consult        Hyperbilirubinemia- (present on admission)   Assessment & Plan    Secondary to obstructive pancreatic cancer  GI has been consulted for stent placement          COPD (chronic obstructive pulmonary disease) (CMS-HCC)- (present on admission)   Assessment & Plan    Currently in no acute exacerbation  Continue Spiriva  Breathing treatments when necessary  RT protocol            VTE prophylaxis:Lovenox .

## 2017-12-06 NOTE — ASSESSMENT & PLAN NOTE
Currently in no acute exacerbation  Continue Spiriva  Breathing treatments when necessary  RT protocol

## 2017-12-06 NOTE — PROGRESS NOTES
Gi consult dictated:  Impression:  1. Obstructive jaundice  2. 5cm pancreatic head mass.   3. New onset severe upper abdominal pain.  4. Pruritus.  5. Cirrhosis  6. HCV treated to cure in 2016.   7. Gallstones  8. COPD on 2-4L O2.  9. Anxiety  10. 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 Pancreatic cancer pain will be difficult to treat until jaundiced is alleviated.   11. Coagulopathy  12. CAD on imaging  13. Pancreatic insufficiency diarrhea.   Plan:  1. My office will work on scheduling EUS/ERCP at [facility] tomorrow or Friday. He'll need FNA of mass, bile duct stenting and celiac block.  2. Vit k to try and correct coags  3. NPO at midnight.   4. CA 19-9.   5. Pancreatic enzymes can be prescribed if diarrhea persists.

## 2017-12-06 NOTE — CONSULTS
Palliative Care H&P    Author: Aundrea Joyner    Date & Time note created:    12/6/2017   2:37 PM       Date of Consult: 12/06/17  Requesting Physician: Brenda Morgan MD   Consulting Physician: Dr. Joyner  Reason for Consult: Pain     History of Present Illness:    Mr. Barrios is a 67 year old man with a past medical history of hepatitis C and is currently being evaluated for a pancreatic mass, who presented to the ED last night with sharp mid epigastric pain that was 10/10. No radiation of pain. The pain began suddenly at 4 pm 12/05 at an intesity of 5/10 and progressed throughout the evening. He has jaundice with pruritis, which he reports began one week ago. He reports that he has had chronic diarrhea since Sept. 28, but has not had a bowel movement for the last 3 days. He is being followed by Dr. Alanis in GI. He has a history of IVDU with abstinence from drug use for many years and chronic pain. He has taken methadone for the last 9 years. Today, he reports that his pain is improved to 5/10 after receiving Dilaudid.       Review of Systems:     Review of Systems   Constitutional: Negative.    HENT: Negative.    Eyes: Negative.    Respiratory: Negative.    Cardiovascular: Negative.    Gastrointestinal: Positive for abdominal pain and constipation.   Genitourinary: Negative.    Musculoskeletal: Negative.    Skin: Positive for itching and rash.   Neurological: Negative.    Endo/Heme/Allergies: Negative.    Psychiatric/Behavioral: Negative.        Physical Exam:  Physical Exam   Constitutional: He is oriented to person, place, and time. No distress.   obese   Eyes: Pupils are equal, round, and reactive to light. Scleral icterus is present.   Abdominal: He exhibits distension. There is tenderness.   Musculoskeletal: He exhibits no edema.   Neurological: He is alert and oriented to person, place, and time.   Skin: Skin is warm and dry. He is not diaphoretic.   Jaundice         Past Medical History:  Hepatitis  C  COPD  Arthritis  BPH  Chronic pain  Mood disorder        Past Surgical History:  patient denies any surgical history      Current Outpatient Medications:  Home Medications     Reviewed by Lian Bravo R.N. (Registered Nurse) on 12/06/17 at 0933  Med List Status: Not Addressed   Medication Last Dose Status   celecoxib (CELEBREX) 100 MG CAPS 7/26/2017 Active   cyclobenzaprine (FLEXERIL) 5 MG tablet 7/26/2017 Active   diphenoxylate-atropine (LOMOTIL) 2.5-0.025 MG Tab  Active   docusate sodium (COLACE) 100 MG CAPS 7/26/2017 Active   gabapentin (NEURONTIN) 100 MG Cap  Active   hydrOXYzine (ATARAX) 25 MG Tab 7/26/2017 Active   methadone (DOLOPHINE) 10 MG/ML Conc 7/26/2017 Active   Misc. Devices Misc 7/26/2017 Active   Misc. Devices Misc 7/26/2017 Active   tiotropium (SPIRIVA HANDIHALER) 18 MCG Cap 7/26/2017 Active   Tiotropium Bromide-Olodaterol (STIOLTO RESPIMAT) 2.5-2.5 MCG/ACT Aero Soln  Active   vitamin D, Ergocalciferol, (DRISDOL) 91595 units Cap capsule  Active                Medication Allergy/Sensitivities:  Allergies   Allergen Reactions   • Indocin [Indometacin Sodium]      Severe headaches   • Cortisone Unspecified     Severe pain          Family History:   Cancer Mother      • Heart Disease Mother     • Hypertension Mother     • Arthritis Mother     • Hypertension Father     • Arthritis Father     • Stroke Father             Social History:   Smoking: yes  Alcohol: no  Illicit drugs: heroin IV  Living situation: lives alone at own home    Palliative Performance Scale: 60%    Spiritual History: not done    Advanced Care Planning and Goals of Care: See note by PC nurse Gabriela Nicholson RN. Patient wishes to complete an AD and POLST. Has a friend Ana who will be DPOA. At this point is expressing that he is unlikely to want to go through chemotherapy. Wishes control of symptoms. Wishes to remain DNR.      Vitals:  Weight/BMI: Body mass index is 31.57 kg/m².  /69   Pulse 64   Temp 36.8 °C (98.2  "°F)   Resp 20   Ht 1.778 m (5' 10\")   Wt 99.8 kg (220 lb)   SpO2 97%   BMI 31.57 kg/m²   Vitals:    17 0343 17 0530 17 0827 17 0935   BP:   110/69    Pulse: 69 67 64    Resp: 19 20 20    Temp:   36.8 °C (98.2 °F)    SpO2: 99% 98% 97%    Weight:    99.8 kg (220 lb)   Height:    1.778 m (5' 10\")     Oxygen Therapy:  Pulse Oximetry: 97 %, O2 (LPM): 4, O2 Delivery: Silicone Nasal Cannula      Lab Data Review:  Recent Results (from the past 24 hour(s))   EKG (ER)    Collection Time: 17  1:11 AM   Result Value Ref Range    Report       [facility]    Test Date:  2017  Pt Name:    HENOK CHAN               Department: ER  MRN:        [id number]                      Room:       [facility]  Gender:     M                            Technician: [id number]  :        1950                   Requested By:ER TRIAGE PROTOCOL  Order #:    [id number]                    Reading MD:    Measurements  Intervals                                Axis  Rate:       74                           P:          67  IN:         176                          QRS:        -48  QRSD:       100                          T:          55  QT:         408  QTc:        453    Interpretive Statements  SINUS RHYTHM  CONSIDER LEFT ATRIAL ABNORMALITY  LEFT ANTERIOR FASCICULAR BLOCK  EARLY PRECORDIAL R/S TRANSITION  No previous ECG available for comparison     CBC WITH DIFFERENTIAL    Collection Time: 17  1:36 AM   Result Value Ref Range    WBC 8.1 4.8 - 10.8 K/uL    RBC 3.32 (L) 4.70 - 6.10 M/uL    Hemoglobin 10.5 (L) 14.0 - 18.0 g/dL    Hematocrit 29.1 (L) 42.0 - 52.0 %    MCV 87.7 81.4 - 97.8 fL    MCH 31.6 27.0 - 33.0 pg    MCHC 36.1 (H) 33.7 - 35.3 g/dL    RDW 77.1 (H) 35.9 - 50.0 fL    Platelet Count 220 164 - 446 K/uL    MPV 11.2 9.0 - 12.9 fL    Neutrophils-Polys 84.30 (H) 44.00 - 72.00 %    Lymphocytes 4.80 (L) 22.00 - 41.00 %    Monocytes 8.60 0.00 - 13.40 %    Eosinophils 0.10 " 0.00 - 6.90 %    Basophils 0.70 0.00 - 1.80 %    Immature Granulocytes 1.50 (H) 0.00 - 0.90 %    Nucleated RBC 0.00 /100 WBC    Lymphs (Absolute) 0.39 (L) 1.00 - 4.80 K/uL    Monos (Absolute) 0.70 0.00 - 0.85 K/uL    Eos (Absolute) 0.01 0.00 - 0.51 K/uL    Baso (Absolute) 0.06 0.00 - 0.12 K/uL    Immature Granulocytes (abs) 0.12 (H) 0.00 - 0.11 K/uL    NRBC (Absolute) 0.00 K/uL    Neutrophils (Absolute) 6.84 1.82 - 7.42 K/uL   COMP METABOLIC PANEL    Collection Time: 12/06/17  1:36 AM   Result Value Ref Range    Sodium 133 (L) 135 - 145 mmol/L    Potassium 3.8 3.6 - 5.5 mmol/L    Chloride 101 96 - 112 mmol/L    Co2 21 20 - 33 mmol/L    Anion Gap 11.0 0.0 - 11.9    Glucose 123 (H) 65 - 99 mg/dL    Bun 11 8 - 22 mg/dL    Creatinine 0.98 0.50 - 1.40 mg/dL    Calcium 8.7 8.5 - 10.5 mg/dL    AST(SGOT) 102 (H) 12 - 45 U/L    ALT(SGPT) 71 (H) 2 - 50 U/L    Alkaline Phosphatase 353 (H) 30 - 99 U/L    Albumin 3.2 3.2 - 4.9 g/dL    Total Protein 6.2 6.0 - 8.2 g/dL    Globulin 3.0 1.9 - 3.5 g/dL    A-G Ratio 1.1 g/dL    Total Bilirubin 38.5 (H) 0.1 - 1.5 mg/dL   LIPASE    Collection Time: 12/06/17  1:36 AM   Result Value Ref Range    Lipase 158 (H) 11 - 82 U/L   ESTIMATED GFR    Collection Time: 12/06/17  1:36 AM   Result Value Ref Range    GFR If African American >60 >60 mL/min/1.73 m 2    GFR If Non African American >60 >60 mL/min/1.73 m 2   PERIPHERAL SMEAR REVIEW    Collection Time: 12/06/17  1:36 AM   Result Value Ref Range    Peripheral Smear Review see below    PLATELET ESTIMATE    Collection Time: 12/06/17  1:36 AM   Result Value Ref Range    Plt Estimation Normal    MORPHOLOGY    Collection Time: 12/06/17  1:36 AM   Result Value Ref Range    RBC Morphology Present     Large Platelets 1+     Poikilocytosis 3+     Target Cells 3+    DIFFERENTIAL COMMENT    Collection Time: 12/06/17  1:36 AM   Result Value Ref Range    Comments-Diff see below    PROTHROMBIN TIME    Collection Time: 12/06/17  1:36 AM   Result Value Ref  Range    PT 20.0 (H) 12.0 - 14.6 sec    INR 1.74 (H) 0.87 - 1.13       Imaging/Procedures Review:    CT-CHEST,ABDOMEN,PELVIS WITH   Final Result            1.  5 cm mass in the head of the pancreas causing obstruction of the common bile duct and marked intrahepatic biliary ductal dilatation suspicious for pancreatic carcinoma. Further there is evidence of peripancreatic and periportal adenopathy.    Additionally there are several small low-attenuation lesions in the right lobe of the liver which may represent metastatic disease.      2.  Multiple small bulla throughout the lung fields with an exceptionally large bullous lesion in the left lower lobe which measures 17 cm in greatest diameter.      3.  Extensive coronary artery disease.             Problem List:  1. Epigastric pain - likely caused by pancreatic mass  2.  Obstructive Jaundice causing pruritis   3. Pancreatic mass -5 CM  4. COPD - previous tobacco use  5. Hyperbilirubinemia  6. Hepatitis C  7. H/O IVDU -heroin  8. Methadone maintenance  9. PVD   10. Peripheral Neuropathy        Discussion/Recommendations:    1. Pain  He is due to have a stent placement on Dec. 15 with the goal to improve his pain and reduce the hyperbilirubinemia. Dr Alvarez is attempting to move this date up due to extremely high bilirubin. A celiac plexus block is recommended by Dr. Alvarez to treat his pain. His pain is possibly caused by the pancreatic mass eroding into the celiac plexus. His 9 year history of methadone use has given him a significant tolerance to opiate pain medication. His pain will therefore be difficult to manage and the celliac plexus block is an excellent option. I recommend maintaining his current dose of methadone at 100 mg/day and not decreasing this. His pain is controlled with Dilaudid and continued use is recommended. He is unable to receive morphine at this time with such high bilirubin. Adding morphine may be considered after stent placement and  reduction of bilirubin. Adding gabapentin will likely help control his pain as it is neuropathic and to relieve his pruritis. He has previously been on gabapentin for leg cramps and tolerated it well so we will start his dose at 300 mg po q8 h. We will monitor him for possible side effects of dizziness.  2. Pancreatic Mass  He is undecided about treatment and is interested in knowing more about his options. EUS biopsy also planned. We recommend a consult with Oncology to discuss this once pathology is obtained     Total visit time was 75 minutes, with greater than 50% of the time spent in counseling and care coordination. See plan for details.

## 2017-12-06 NOTE — PROGRESS NOTES
Savannah Torres Fall Risk Assessment:     Last Known Fall: No falls  Mobility: Dizziness/generalized weakness  Medications: Cardiovascular or central nervous system meds  Mental Status/LOC/Awareness: Awake, alert, and oriented to date, place, and person  Toileting Needs: No needs  Volume/Electrolyte Status: Use of IV fluids/tube feeds  Communication/Sensory: Visual (Glasses)/hearing deficit  Behavior: Appropriate behavior  Savannah Torres Fall Risk Total: 8  Fall Risk Level: LOW RISK    Universal Fall Precautions:  call light/belongings in reach, bed in low position and locked, wheelchairs and assistive devices out of sight, siderails up x 2, adequate lighting, use non-slip footwear, clutter free and spill free environment, educate on level of risk, educate to call for assistance    Fall Risk Level Interventions:   TRIAL (TELE 8, NEURO, MED HAROON 5) Low Fall Risk Interventions  Place yellow fall risk ID band on patient: completed  Provide patient/family education based on risk assessment: completed  Educate patient/family to call staff for assistance when getting out of bed: completed  Place fall precaution signage outside patient door: completed      Patient Specific Interventions:     Medication: review medications with patient and family and limit combination of prn medications  Mental Status/LOC/Awareness: reinforce falls education, utilize bed/chair fall alarm and reinforce the use of call light  Toileting: instruct patient/family on the need to call for assistance when toileting, do not leave patient unattended in bathroom/refer to toileting scripting and toilet prior to giving pain medications  Volume/Electrolyte Status: ensure patient remains hydrated, monitor abnormal lab values and ensure IV fluids are appropriate  Communication/Sensory: update plan of care on whiteboard, ensure proper positioning when transferrng/ambulating, ensure patient has glasses/contacts and hearing aids/dentures and for visually  impaired patients orient to their room surrounding and do not change their surroundings  Behavioral: encourage patient to voice feelings, engage patient in daily activities, administer medication as ordered and instruct/reinforce fall program rationale  Mobility: ensure bed is locked and in lowest position, provide appropriate assistive device, instruct patient to exit bed on their strongest side and collaborate with doctor for possible PT/OT consult

## 2017-12-06 NOTE — PROGRESS NOTES
Pt transferred to unit, pt oriented to unit, poc, rounds, call light. Pt denies SOB, no CP, no N/V. Pt states pain 7/10, pt states pain controlled at this number, mid upper abd pain. Pt states worse pain with movement. Pt requesting something for constipation, his methadone that he has been on previously, RN to notify Md. All other needs met, call light in reach. Will continue to monitor.

## 2017-12-06 NOTE — ED NOTES
Patient's friend, Ginna, called and was updated per patient request. Patient resting comfortably; denies needs at this time.

## 2017-12-07 NOTE — DIETARY
"Nutrition Services: Patient seen for weight loss prior to admit    67 y.o. male with admitting DX of Pancreatic cancer  Pertinent History: hepatitis C, COPD, Arthritis, BPH    Height: 177.8 cm (5' 10\")  Weight: 99.8 kg (220 lb)  Body mass index is 31.57 kg/m².     Patient off the floor for ERCP and EGD per nursing.  Patient is on  NPO.  Labs, medications and past medical history reviewed.    Recommend:  Will f/u when patient is available.  RD following plan of care.    "

## 2017-12-07 NOTE — PROGRESS NOTES
Assumed care of pt, received report from day shift RN, pt assessed.  Pt complaining of 4/10 abdominal pain, declines pain medication at this time.  Pt is A&O x4.  Pt low fall risk, wearing treaded socks, bed locked and in lowest position, bed alarm not indicated.  Pt instructed to call for assistance prior to getting OOB, pt verbalized understanding.  Call light, phone, and personal belongings within reach.

## 2017-12-07 NOTE — CONSULTS
DATE OF SERVICE:  12/06/2017    REFERRING PHYSICIAN:  Brenda Morgan MD    REASON FOR CONSULTATION:  Obstructive jaundice and 5 cm pancreatic head mass.    HISTORY OF PRESENT ILLNESS:  The patient is a pleasant 67-year-old man   followed by my group for hepatitis C and liver cirrhosis.  He was treated and   cured of hepatitis C last year.  About 3 weeks ago, he developed jaundice and   an ultrasound showed gallstones, mass in the head of the pancreas and a   dilated duct.  He was scheduled for EUS and ERCP eval on 12/15th, but   yesterday in the afternoon developed fairly severe steady epigastric pain   without any radiation, nausea or vomiting.  On admission here, he has a   bilirubin of 38.  An imaging shows massively dilated ducts, 5 cm pancreatic   head lesion and dilated gallbladder with stones.  He has been told that he   likely has pancreatic cancer.  He has noted dark urine for weeks and for the   past several days, he has also noted some itching.  He developed diarrhea   symptoms with foul greasy stools about 2 months ago.    PAST MEDICAL HISTORY:  COPD, on oxygen; genotype 2B hepatitis C, treated and   cured in 2016; cirrhosis; anxiety; knee arthritis, on methadone; ascites and   gallstones.    PAST SURGICAL HISTORY:  He has not had any prior abdominal surgeries.    ALLERGIES:  CORTISONE.    CURRENT MEDICATIONS:  Tylenol, Lovenox, DuoNeb nebulizer, methadone 100 mg a   day, nicotine patch, Zofran p.r.n., oxycodone, Dilaudid and vitamin K.    SOCIAL HISTORY:  He is single, he is living alone, but has close friends.  He   is disabled secondary to COPD.  He has previously worked as a fisherman and   .    HABITS:  He is an ex-smoker, does not drink alcohol.    FAMILY HISTORY:  No colorectal cancer.    REVIEW OF SYSTEMS:  CONSTITUTIONAL:  He has had about a 30-pound weight loss in the past 3 months.  DERMATOLOGIC:  Itching came on several days ago.  NEUROLOGIC:  No strokes or  seizures.  PSYCHIATRIC:  Has frequent anxiety.  PULMONARY:  He is on 2-4 liters of oxygen and gets short of breath with   exertion.  CARDIAC:  No chest pain, but has extensive atherosclerosis on imaging.  GASTROINTESTINAL:  Diarrhea for several months.  GENITOURINARY:  No problems.  MUSCULOSKELETAL:  Chronic knee arthritis for which he is on narcotics.    PHYSICAL EXAMINATION:  GENERAL:  Pleasant, alert, deeply jaundiced male.  VITAL SIGNS:  Temperature is 36.8, pulse 64, respirations 20 and blood   pressure 110/70.  SKIN:  Notable for deep jaundice.  LYMPH NODES:  No nodes.  HEAD AND NECK:  Sclerae are anicteric.  Oropharynx clear.  Neck is supple.  LUNGS:  Clear bilaterally, but hurts to take a deep breath.  HEART:  Regular rate and rhythm.  ABDOMEN:  Soft, but markedly tender in the epigastrium and upper quadrants.    The gallbladder is palpable.  EXTREMITIES:  About 1+ edema.    LABORATORY DATA:  White count 8.1, hemoglobin 10.5, hematocrit 29.1 and   platelets 220,000.  Sodium 133, potassium is 3.8, BUN 11 and creatinine 0.98.    , ALT 71, alkaline phosphatase 353, total bilirubin 38.5, lipase 158.    INR is 1.74.    IMPRESSION:  1.  Obstructive jaundice.  2.  A 5 cm pancreatic head mass.  3.  New onset severe upper abdominal pain.  4.  Pruritus.  5.  Cirrhosis.  6.  Hepatitis C, treated and cured in 2016.  7.  Gallstones.  8.  Chronic obstructive pulmonary disease, on 2-4 L of oxygen.  9.  Anxiety  10.  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  His pancreatic cancer pain will be   difficult to treat until the jaundice is alleviated.  11.  Coagulopathy.  12.  Coronary artery disease on imaging.  13.  Pancreatic insufficiency diarrhea.    PLAN:  1.  My office is currently working on scheduling him for an EUS and ERCP at   [facility] tomorrow or Friday.  He will need FNA of the mass, bile duct   stenting and a celiac block.  2.  Vitamin K is ordered to try and correct the coags.  3.  N.p.o. at midnight.  4.  CA  19-9 is pending.  5.  He can be put on pancreatic enzymes after the bile duct distended if   diarrhea persists.       ____________________________________     MD HALLIE ROBBINS / DIPIKA    DD:  12/06/2017 14:39:37  DT:  12/06/2017 19:26:03    D#:  [id number]  Job#:  [id number]

## 2017-12-07 NOTE — DISCHARGE PLANNING
Pt's surgery rescheduled for 1500. ILYA requested [facility] transport for 1330. Transport confirmed by [facility] Elana.  ILYA requested standby [facility] transport for pt to return to [facility].   Confirmed with Charge CHANCE Valencia CNA will transport with pt.

## 2017-12-07 NOTE — CARE PLAN
"Problem: Bowel/Gastric:  Goal: Normal bowel function is maintained or improved  Outcome: PROGRESSING AS EXPECTED  Pt chart states no real BM since 12/3.  Pt stated he had a 1/2 c stool this morning, but still feels like there \"is more to come\",    Problem: Psychosocial Needs:  Goal: Level of anxiety will decrease  Outcome: PROGRESSING AS EXPECTED  Pt anxious for procedure.  MD Lora thoroughly explained.   Pt utilizing Restorationist to quell anx.  Reciting rosary now.        "

## 2017-12-07 NOTE — PROGRESS NOTES
Savannah Torres Fall Risk Assessment:     Last Known Fall: No falls  Mobility: Dizziness/generalized weakness  Medications: Cardiovascular or central nervous system meds  Mental Status/LOC/Awareness: Awake, alert, and oriented to date, place, and person  Toileting Needs: No needs  Volume/Electrolyte Status: Use of IV fluids/tube feeds  Communication/Sensory: Visual (Glasses)/hearing deficit  Behavior: Appropriate behavior  Savannah Torres Fall Risk Total: 8  Fall Risk Level: LOW RISK    Universal Fall Precautions:  call light/belongings in reach, bed in low position and locked, wheelchairs and assistive devices out of sight, siderails up x 2, use non-slip footwear, adequate lighting, clutter free and spill free environment, educate on level of risk, educate to call for assistance    Fall Risk Level Interventions:   TRIAL (TELE 8, NEURO, MED HAROON 5) Low Fall Risk Interventions  Place yellow fall risk ID band on patient: verified  Provide patient/family education based on risk assessment: completed  Educate patient/family to call staff for assistance when getting out of bed: completed  Place fall precaution signage outside patient door: verified      Patient Specific Interventions:     Medication: review medications with patient and family and assess for medications that can be discontinued or dosage decreased  Mental Status/LOC/Awareness: reinforce falls education, check on patient hourly and reinforce the use of call light  Toileting: provide frquent toileting and instruct patient/family on the need to call for assistance when toileting  Volume/Electrolyte Status: ensure patient remains hydrated, monitor abnormal lab values and ensure IV fluids are appropriate  Communication/Sensory: update plan of care on whiteboard and ensure patient has glasses/contacts and hearing aids/dentures  Behavioral: encourage patient to voice feelings, administer medication as ordered and instruct/reinforce fall program rationale  Mobility:  ensure bed is locked and in lowest position

## 2017-12-07 NOTE — CARE PLAN
Problem: Communication  Goal: The ability to communicate needs accurately and effectively will improve  Outcome: PROGRESSING AS EXPECTED  Pt capable of verbalizing all needs and utilizes call light system approprietly.    Problem: Safety  Goal: Will remain free from falls  Outcome: PROGRESSING AS EXPECTED  Pt low fall risk, pt wearing treaded socks, bed locked and in lowest position, bed alarm not indicated.  Pt call light and phone within reach.

## 2017-12-07 NOTE — PROGRESS NOTES
Savannah Torres Fall Risk Assessment:     Last Known Fall: No falls  Mobility: Dizziness/generalized weakness  Medications: Cardiovascular or central nervous system meds  Mental Status/LOC/Awareness: Awake, alert, and oriented to date, place, and person  Toileting Needs: No needs  Volume/Electrolyte Status: Use of IV fluids/tube feeds  Communication/Sensory: Visual (Glasses)/hearing deficit  Behavior: Appropriate behavior  Savannah Torres Fall Risk Total: 8  Fall Risk Level: LOW RISK    Universal Fall Precautions:  call light/belongings in reach, bed in low position and locked, wheelchairs and assistive devices out of sight, siderails up x 2, use non-slip footwear, adequate lighting, clutter free and spill free environment, educate on level of risk, educate to call for assistance    Fall Risk Level Interventions:   TRIAL (TELE 8, NEURO, MED HAROON 5) Low Fall Risk Interventions  Place yellow fall risk ID band on patient: verified  Provide patient/family education based on risk assessment: completed  Educate patient/family to call staff for assistance when getting out of bed: completed  Place fall precaution signage outside patient door: verified      Patient Specific Interventions:     Medication: review medications with patient and family, assess for medications that can be discontinued or dosage decreased and limit combination of prn medications  Mental Status/LOC/Awareness: reorient patient, reinforce falls education, check on patient hourly, reinforce the use of call light and provide activity  Toileting: provide frquent toileting and monitor intake and output/use of appropriate interventions  Volume/Electrolyte Status: advance diet as tolerated, monitor abnormal lab values and ensure IV fluids are appropriate  Communication/Sensory: update plan of care on whiteboard, ensure proper positioning when transferrng/ambulating and ensure patient has glasses/contacts and hearing aids/dentures  Behavioral: encourage patient  to voice feelings, engage patient in daily activities, administer medication as ordered and instruct/reinforce fall program rationale  Mobility: schedule physical activity throughout the day, utilize bed/chair fall alarm, ensure bed is locked and in lowest position, provide appropriate assistive device, instruct patient to exit bed on their strongest side and collaborate with doctor for possible PT/OT consult

## 2017-12-07 NOTE — RESPIRATORY CARE
COPD EDUCATION by COPD CLINICAL EDUCATOR  12/7/2017 at 6:35 AM by Olivia Ly     Patient reviewed by COPD education team. Patient does not qualify for COPD program.

## 2017-12-07 NOTE — PROGRESS NOTES
Spoke to Terra in Pre-Op.  Pt is all set, being transported by [facility].  Escorted by Staff RNBianca.  Pre-op checklist complete.  MD Lora went over procedure and consent bedside this morning.   Awaiting arrival of transport to take patient.

## 2017-12-07 NOTE — DISCHARGE PLANNING
Received transportation request for patient to transfer to [facility] for GI procedure via [facility]. Call placed to [place] and transport time arranged for 1330.     Transportation has also been arranged for patient to return transfer to [facility]. Info sent and call placed to [facility], spoke to Honorio and transportation has been arranged for 1500 on a will call.     ILYA Mackay has been notified.

## 2017-12-07 NOTE — DISCHARGE PLANNING
Late Entry from 11/6/17 - Met with patient to complete Advance Directive - answered questions, completed document and notarized. Will scan into Epic. KATHY Colbert - [phone]

## 2017-12-07 NOTE — PROGRESS NOTES
Assumed care of patient @ 0700, report received at bedside,  assessment done, labs and orders noted.  Pt A & Ox4, PIV running maintenance fluids as per MAR.  Pt is resting comfortably in bed, no signs or symptoms of distress.  Pt reports pain is a 8/10, provided dilaudid as per MAR.  Pt has been updated on the plan of care, transport scheduled for 1330 for procedure at [facility] today at 1500.    Bed alarm is on, bed is in lowest position, fall risk socks in place, call light within reach. Pt verbalized all needs are met at this time.

## 2017-12-07 NOTE — OR SURGEON
Immediate Post OP Note    PreOp Diagnosis: Pancreatic mass, obstructive jaundice    PostOp Diagnosis: Same    Procedure(s):  GASTROSCOPY - Wound Class: Clean Contaminated  EGD W/ENDOSCOPIC ULTRASOUND - UPPER, RADIAL - Wound Class: Clean Contaminated  EGD WITH ASP/BX - FNA - Wound Class: Clean Contaminated  ERCP W/CELIAC PLEXUS BLOCK - Wound Class: Clean Contaminated    Surgeon(s):  Rui Will M.D.    Anesthesiologist/Type of Anesthesia:  Anesthesiologist: Tj Dean M.D./* No anesthesia type entered *    Surgical Staff:  Circulator: Lencho Estrella R.N.  Endoscopy Technician: Chrissy Meadows    Specimens:  * No specimens in log *    Dr. Will  [facility]  [facility]  [phone]    EGD with: Biopsy    EUS with: FNA    Celiac plexus neurolysis    ERCP  Failed due to inability to identify biliary orifice - tumor    Radiologic interpretation of static and dynamic fluoroscopic images    Indication: Head of pancreas mass, obstructive jaundice, abdominal pain    Sedation: GA (Jermaine)    Findings:   EGD    Esophagus     Suspected candidiasis - biopsied    Stomach     Unremarkable muocsa    Duodenum     Mass      Fungating and friable      4cm      At level of ampulla     Otherwise unremarkable mucosa     EUS    Celiac axis     Extensive bulky adenopathy     FNA 22G Shark Core    Pancreas body/tail     Atrophic gland     Hypoechoic, heterogeneous    Pancreatic duct     Dilated and ectatic through to tail    Head of pancreas mass     Heterogeneous     Hypoechoic     Invasive into duodenum     Irregular borders     49.8mm x 32.4mm     FNA 22G Shark Core       ** PRELIMINARY PATHOLOGY (+) FOR MALIGNANCY **      Ampulla     Replaced by tumor    CBD     Dilated upstream from pancreatic mass     ERCP  ** FAILED **    Ampulla     Replaced by tumor     Fungating and friable     Contact bleeding with any manipulation     Unable to identify biliary or pancreatic orifice    Pancreatic duct     Not  cannulated    CBD     Unable to identify or cannulated     Therapeutics    Celiac plexus neurolysis     5mL NS cushion     20mL Bupivacaine - 0.25%     20mL Dehydrated alcohol - 98%     5mL NS flush      Plan:  Follow up final pathology    Return to [facility] when criteria met    Percutaneous cholangiogram (PTC) with external vs internal drain ordered    Follow liver enzymes    Oncology evaluation      12/7/2017 3:36 PM Rui Will

## 2017-12-07 NOTE — PROGRESS NOTES
RN confirmed with SW and notified France from [facility] that transport has been arranged for pt on 12/7. See SW notes for details.

## 2017-12-07 NOTE — DISCHARGE PLANNING
Care Transition Team Assessment    IHD met with pt at bedside. Pt currently lives alone, but states that he has local community support to rely on. He does not have DME or HHC at this time, but does have home O2 provided by [employer]. Pt states that he will have his friend Rebecca provide transportation upon d/c if needed.     Information Source  Orientation : Oriented x 4  Information Given By: Patient  Informant's Name: juan luis  Who is responsible for making decisions for patient? : Patient         Elopement Risk  Legal Hold: No  Ambulatory or Self Mobile in Wheelchair: Yes  Disoriented: No  Psychiatric Symptoms: None  History of Wandering: No  Elopement this Admit: No  Vocalizing Wanting to Leave: No  Displays Behaviors, Body Language Wanting to Leave: No-Not at Risk for Elopement  Elopement Risk: Not at Risk for Elopement    Interdisciplinary Discharge Planning  Does Admitting Nurse Feel This Could be a Complex Discharge?: Yes  Primary Care Physician: none (was Dr. Santana)  Lives with - Patient's Self Care Capacity: Alone and Able to Care For Self  Patient or legal guardian wants to designate a caregiver (see row info): Yes  Caregiver name: Rebecca Cardona  Caregiver relationship to patient: friend (if needed, she can make decisions for pt)  Caregiver contact info: [phone] (C: [phone])  Support Systems: Friends / Neighbors  Housing / Facility: Mobile Home  Do You Take your Prescribed Medications Regularly: Yes  Able to Return to Previous ADL's: Other (unsure)  Mobility Issues: Yes (due to acute pain to abd)  Patient Expects to be Discharged to:: unsure  Assistance Needed: Unknown at this Time  Durable Medical Equipment: Home Oxygen  DME Provider / Phone: [employer] Marrero)    Discharge Preparedness  What is your plan after discharge?: Uncertain - pending medical team collaboration  What are your discharge supports?: Other (comment) (Friends/neighbors)  Prior Functional Level: Ambulatory, Drives Self, Independent  with Activities of Daily Living, Independent with Medication Management  Difficulity with ADLs: None  Difficulity with IADLs: None    Functional Assesment  Prior Functional Level: Ambulatory, Drives Self, Independent with Activities of Daily Living, Independent with Medication Management    Finances  Financial Barriers to Discharge: No  Prescription Coverage: Yes ([facility] on [address])    Vision / Hearing Impairment  Vision Impairment : Yes  Right Eye Vision: Impaired, Wears Glasses  Left Eye Vision: Impaired, Wears Glasses  Hearing Impairment : No              Domestic Abuse  Have you ever been the victim of abuse or violence?: No  Physical Abuse or Sexual Abuse: No  Verbal Abuse or Emotional Abuse: No  Possible Abuse Reported to:: Not Applicable    Psychological Assessment  History of Substance Abuse: None  History of Psychiatric Problems: No  Non-compliant with Treatment: No    Discharge Risks or Barriers  Discharge risks or barriers?: No    Anticipated Discharge Information  Anticipated discharge disposition: Discharge needs currently unknown  Discharge Address: unknown at this time  Discharge Contact Phone Number: [phone]

## 2017-12-07 NOTE — NON-PROVIDER
Palliative Progress Note               Author: France Arredondo Date & Time created: 2017  3:02 PM     Interval History:  Mr. Barrios is a 67 year old man admitted for abdominal pain, currently being evaluated for a 5cm pancreatic mass with obstruction. He was transported to [facility] for EUS and ERCP this afternoon and will return to [facility] after the procedure. His pain today has improved with dilaudid and methadone.     Review of Systems:  Positive for pain. Positive for anxiety. Positive for constipation.        Physical Exam:  Physical Exam   Constitutional: He is oriented to person, place, and time. He appears well-nourished. No distress.   Eyes: Scleral icterus is present.   Abdominal: He exhibits distension. There is tenderness.   Neurological: He is alert and oriented to person, place, and time.   Skin: Skin is warm and dry. Rash noted. He is not diaphoretic.   jaundice   Psychiatric: He has a normal mood and affect.       Lab Results:  Recent Labs      17   0136  17   0214   SODIUM  133*  135   POTASSIUM  3.8  4.1   CHLORIDE  101  104   CO2  21  21   GLUCOSE  123*  91   BUN  11  14   CREATININE  0.98  0.93   CALCIUM  8.7  8.2*     Recent Labs      17   0136  17   0318   WBC  8.1  5.2   RBC  3.32*  3.03*   HEMOGLOBIN  10.5*  9.6*   HEMATOCRIT  29.1*  26.8*   MCV  87.7  88.4   MCH  31.6  31.7   MCHC  36.1*  35.8*   RDW  77.1*  79.1*   PLATELETCT  220  185   MPV  11.2  11.4         Hemodynamics:  Temp (24hrs), Av.8 °C (98.2 °F), Min:36.6 °C (97.8 °F), Max:36.9 °C (98.5 °F)  Temperature: 36.6 °C (97.8 °F)  Pulse  Av.9  Min: 50  Max: 80   Blood Pressure : 107/67       Medications:  • methadone  80 mg      And   • methadone  20 mg     • senna-docusate  2 Tab     • enoxaparin  40 mg     • nicotine  21 mg     • tiotropium  1 Cap     • phytonadione  10 mg     • gabapentin  300 mg         Problem List:  1. Epigastric pain - likely caused by pancreatic mass  2.  Obstructive Jaundice causing pruritis   3. Pancreatic mass -5 CM  4. COPD - previous tobacco use  5. Hyperbilirubinemia  6. Hepatitis C  7. H/O IVDU -heroin  8. Methadone maintenance  9. PVD   10. Peripheral Neuropathy    Plan:    1. Abdominal Pain  His pain is improved today and is better controlled with oxycodone, Dilaudid, Methadone 100 and gabapentin.  His 9 year history of methadone use has given him a significant tolerance to opiate pain medication. His pain will therefore be difficult to manage and the celliac plexus block is an excellent option.     2. Pancreatic Mass  He has gone for EUS today for FNA of the mass, bile duct stenting and a celiac block. He is leaning towards comfort care, but would like to discuss chemotherapy options. Once pathology is obtained, we recommend a consult with Oncology to discuss this.    Code status discussed:    Transition planning discussed:    Goals of care addressed:    Total visit time 15 minutes with 50% of the time spent with counseling and case coordination.    Medications reviewed. Labs Reviewed.

## 2017-12-07 NOTE — DISCHARGE PLANNING
Clinical Note per Chart Review:   CC: Abdominal Pain  CT Abd:  5 cm mass in the head of pancreas, causing obstruction of the common bile duct and marked intrahepatic biliary ductal dilation suspicious for pancreatic carcinoma.   Dilaudid 0.5-1.0 mg every 3 hrs PRN.  Palliative Care Consult    Discharge Plan: SW to complete discharge plan as appropriate.

## 2017-12-07 NOTE — DISCHARGE PLANNING
Call placed to Brina with [facility] for transportation Auth for patient transport from [facility] to [facility] via [facility].   #[id number]  Call placed to Deyvi with [facility], information has been give regarding Auth.

## 2017-12-07 NOTE — PROGRESS NOTES
[facility] Hospitalist Progress Note    Date of Service: 2017    Chief Complaint  67M w hx of hep C s/p treatment ([facility]), chronic resp failure with hypoxia due to COPD, admitted with worsening abdominal pain found to have 5cm pancreatic mass w obstruction.      Interval Problem Update  : Plan for EUS at [facility], pain is improved w dilaudid and methadone.  Pending biopsy results prior to onc consult    Consultants/Specialty  Dr. Alvarez - GI    Disposition  EUS at [facility]  Biopsy --> Follow up path  Pain control        Review of Systems   Constitutional: Negative for chills, fever and malaise/fatigue.   HENT: Negative for sore throat.    Respiratory: Negative for cough and shortness of breath.    Cardiovascular: Negative for chest pain and palpitations.   Gastrointestinal: Positive for abdominal pain. Negative for blood in stool, diarrhea, heartburn, nausea and vomiting.   Genitourinary: Negative for dysuria and frequency.   Musculoskeletal: Negative for back pain and myalgias.   Neurological: Negative for dizziness, focal weakness, weakness and headaches.   Psychiatric/Behavioral: Positive for memory loss (Difficulty finding words). Negative for depression and hallucinations.   All other systems reviewed and are negative.     Physical Exam  Laboratory/Imaging   Hemodynamics  Temp (24hrs), Av.8 °C (98.2 °F), Min:36.6 °C (97.8 °F), Max:36.9 °C (98.5 °F)   Temperature: 36.6 °C (97.8 °F)  Pulse  Av.9  Min: 50  Max: 80    Blood Pressure : 107/67      Respiratory      Respiration: 17, Pulse Oximetry: 98 %        RUL Breath Sounds: Diminished, RML Breath Sounds: Diminished, RLL Breath Sounds: Diminished, NARESH Breath Sounds: Diminished, LLL Breath Sounds: Diminished    Fluids    Intake/Output Summary (Last 24 hours) at 17 1304  Last data filed at 17 0800   Gross per 24 hour   Intake                0 ml   Output              575 ml   Net             -575 ml       Nutrition  Orders Placed This Encounter    Procedures   • DIET NPO     Standing Status:   Standing     Number of Occurrences:   8     Order Specific Question:   Type:     Answer:   At Midnight [5]     Order Specific Question:   Restrict to:     Answer:   Sips with Medications [3]     Physical Exam   Constitutional: He is oriented to person, place, and time. He appears well-developed and well-nourished. No distress.   HENT:   Head: Normocephalic.   Mouth/Throat: No oropharyngeal exudate.   Eyes: Conjunctivae are normal. Pupils are equal, round, and reactive to light. Scleral icterus is present.   Neck: Normal range of motion. No tracheal deviation present.   Cardiovascular: Normal rate and regular rhythm.    No murmur heard.  Pulmonary/Chest: Effort normal. No respiratory distress. He has no wheezes. He exhibits no tenderness.   Abdominal: Soft. He exhibits distension. There is tenderness. There is guarding.   Musculoskeletal: Normal range of motion. He exhibits no edema or tenderness.   Lymphadenopathy:     He has no cervical adenopathy.   Neurological: He is alert and oriented to person, place, and time. No cranial nerve deficit.   Skin: Skin is warm and dry. No rash noted.   Jaundiced   Psychiatric: He has a normal mood and affect. His behavior is normal.   Nursing note and vitals reviewed.      Recent Labs      12/06/17 0136 12/07/17   0318   WBC  8.1  5.2   RBC  3.32*  3.03*   HEMOGLOBIN  10.5*  9.6*   HEMATOCRIT  29.1*  26.8*   MCV  87.7  88.4   MCH  31.6  31.7   MCHC  36.1*  35.8*   RDW  77.1*  79.1*   PLATELETCT  220  185   MPV  11.2  11.4     Recent Labs      12/06/17 0136 12/07/17   0214   SODIUM  133*  135   POTASSIUM  3.8  4.1   CHLORIDE  101  104   CO2  21  21   GLUCOSE  123*  91   BUN  11  14   CREATININE  0.98  0.93   CALCIUM  8.7  8.2*     Recent Labs      12/06/17 0136   INR  1.74*                  Assessment/Plan     * Obstructive jaundice- (present on admission)   Assessment & Plan    Secondary to obstructive pancreatic cancer,  marked bili elevation >38 (down to 32 today)  GI to place stent  F/U biopsy results          Pancreatic cancer (CMS-HCC)- (present on admission)   Assessment & Plan    Advanced, likely with metastasis to the liver as seen on CT imaging.  He does have a hx of HCC and has been treated/followed by [facility].  Scan in 6/2017 showed reactive peripancreatic lymph notes which could have suggested CA at that time.  Patient at this point would like to answer questions re: chemotherapy options but is leaning towards comfort care  Going to [facility] for EUS today for stent (palliative), possible celaic block and biopsy  Pain control with oxycodone and Dilaudid  Methadone 100  Palliative care is following, discussed case w them        Normocytic anemia   Assessment & Plan    No e/o blood loss, likely from malignancy  -Monitor        Hyponatremia   Assessment & Plan    Improved   Repeat in AM        Chronic respiratory failure with hypoxia (CMS-HCC)   Assessment & Plan    Due to COPD, wears 2L at baseline  -No acute complaints  -Continue home inhaler regimen        Hepatitis C- (present on admission)   Assessment & Plan    S/p treatment still w cirrhosis, followed by GI        COPD (chronic obstructive pulmonary disease) (CMS-HCC)- (present on admission)   Assessment & Plan    Currently in no acute exacerbation  Continue Spiriva  Breathing treatments when necessary  RT protocol            Reviewed items::  EKG reviewed, Labs reviewed, Medications reviewed and Radiology images reviewed  Scales catheter::  No Scales

## 2017-12-08 PROBLEM — Z99.81 DEPENDENCE ON SUPPLEMENTAL OXYGEN: Status: RESOLVED | Noted: 2017-01-01 | Resolved: 2017-01-01

## 2017-12-08 PROBLEM — E87.1 HYPONATREMIA: Status: RESOLVED | Noted: 2017-01-01 | Resolved: 2017-01-01

## 2017-12-08 PROBLEM — D68.9 COAGULOPATHY (HCC): Status: ACTIVE | Noted: 2017-01-01

## 2017-12-08 NOTE — PROGRESS NOTES
[facility] Hospitalist Progress Note          Chief Complaint  67M w hx of hep C s/p treatment ([facility]), chronic resp failure with hypoxia due to COPD, admitted with worsening abdominal pain found to have 5cm pancreatic mass w obstruction.       Interval Problem Update  : Plan for EUS at [facility], pain is improved w dilaudid and methadone.  Pending biopsy results prior to onc consult    : Unsuccessful stent under EUS, planned for IR placement today.  Biopsy taken, results pending     Consultants/Specialty  Dr. Alvarez - GI     Disposition  EUS at [facility]  Biopsy --> Follow up path  Pain control         Review of Systems   Constitutional: Negative for chills, fever and +malaise/fatigue.   HENT: Negative for sore throat.    Respiratory: Negative for cough and shortness of breath.    Cardiovascular: Negative for chest pain and palpitations.   Gastrointestinal: Positive for abdominal pain. Negative for blood in stool, diarrhea, heartburn, nausea and vomiting.   Genitourinary: Negative for dysuria and frequency.   Musculoskeletal: Negative for back pain and myalgias.   Neurological: Negative for dizziness, focal weakness, weakness and headaches.   Psychiatric/Behavioral: Positive for memory loss (Difficulty finding words). Occasionally confused.  Negative for depression and hallucinations.   All other systems reviewed and are negative.      Physical Exam   Laboratory/Imaging   Hemodynamics  Temp (24hrs), Av.8 °C (98.2 °F), Min:36.6 °C (97.8 °F), Max:36.9 °C (98.5 °F)   Temperature: 36.6 °C (97.8 °F)  Pulse  Av.9  Min: 50  Max: 80    Blood Pressure : 107/67       Respiratory      Respiration: 17, Pulse Oximetry: 98 %        RUL Breath Sounds: Diminished, RML Breath Sounds: Diminished, RLL Breath Sounds: Diminished, NARESH Breath Sounds: Diminished, LLL Breath Sounds: Diminished     Fluids     Intake/Output Summary (Last 24 hours) at 17 1304  Last data filed at 17 0800    Gross per 24 hour   Intake                 0 ml   Output              575 ml   Net             -575 ml         Nutrition        Orders Placed This Encounter   Procedures   • DIET NPO       Standing Status:   Standing       Number of Occurrences:   8       Order Specific Question:   Type:       Answer:   At Midnight [5]       Order Specific Question:   Restrict to:       Answer:   Sips with Medications [3]      Physical Exam   Constitutional: He is oriented to person, place, and time. He appears well-developed and well-nourished. No distress.   HENT:   Head: Normocephalic.   Mouth/Throat: No oropharyngeal exudate.   Eyes: Conjunctivae are normal. Pupils are equal, round, and reactive to light. Scleral icterus is present.   Neck: Normal range of motion. No tracheal deviation present.   Cardiovascular: Normal rate and regular rhythm.    No murmur heard.  Pulmonary/Chest: Effort normal. No respiratory distress. He has no wheezes. He exhibits no tenderness.   Abdominal: Soft. He exhibits distension. There is tenderness. There is guarding.   Musculoskeletal: Normal range of motion. He exhibits no edema or tenderness.   Lymphadenopathy:     He has no cervical adenopathy.   Neurological: He is alert and oriented to person, place, and time. No cranial nerve deficit.   Skin: Skin is warm and dry. No rash noted.   Jaundiced   Psychiatric: He has a normal mood and affect. His behavior is normal.   Nursing note and vitals reviewed.           Recent Labs      12/06/17 0136 12/07/17   0318   WBC  8.1  5.2   RBC  3.32*  3.03*   HEMOGLOBIN  10.5*  9.6*   HEMATOCRIT  29.1*  26.8*   MCV  87.7  88.4   MCH  31.6  31.7   MCHC  36.1*  35.8*   RDW  77.1*  79.1*   PLATELETCT  220  185   MPV  11.2  11.4           Recent Labs      12/06/17 0136 12/07/17   0214   SODIUM  133*  135   POTASSIUM  3.8  4.1   CHLORIDE  101  104   CO2  21  21   GLUCOSE  123*  91   BUN  11  14   CREATININE  0.98  0.93   CALCIUM  8.7  8.2*          Recent Labs      12/06/17 0136   INR  1.74*                   Assessment/Plan           * Obstructive jaundice- (present on admission)   Assessment & Plan     Secondary to obstructive pancreatic cancer, marked bili elevation >38 (down to 32 today)  IR to place stent  (Unable to be placed w EUS due to extensive obstruction)  F/U biopsy results          Pancreatic cancer (CMS-HCC)- (present on admission)   Assessment & Plan     Advanced, likely with metastasis to the liver as seen on CT imaging.  He does have a hx of HCC and has been treated/followed by [facility].  Scan in 6/2017 showed reactive peripancreatic lymph notes which could have suggested CA at that time.  Patient at this point would like to answer questions re: chemotherapy options but is leaning towards comfort care  Went [facility] for EUS, w celaic block and biopsy  Will to to IR today for stent placement  Pain control with oxycodone and Dilaudid  Methadone 100  Palliative care is following, discussed case w them       Normocytic anemia   Assessment & Plan     No e/o blood loss, likely from malignancy  -Monitor       Hyponatremia   Assessment & Plan     Improved   Repeat in AM       Chronic respiratory failure with hypoxia (CMS-HCC)   Assessment & Plan     Due to COPD, wears 2L at baseline  -No acute complaints  -Continue home inhaler regimen       Hepatitis C- (present on admission)   Assessment & Plan     S/p treatment still w cirrhosis, followed by GI       COPD (chronic obstructive pulmonary disease) (CMS-HCC)- (present on admission)   Assessment & Plan     Currently in no acute exacerbation  Continue Spiriva  Breathing treatments when necessary  RT protocol             Reviewed items::  EKG reviewed, Labs reviewed, Medications reviewed and Radiology images reviewed  Scales catheter::  No Scales

## 2017-12-08 NOTE — PROGRESS NOTES
Report received from PACU RN @ [facility].  Pt scheduled for transport at 1840.  Friend Rebecca at bedside here at [facility] awaiting pt arrival.

## 2017-12-08 NOTE — PROGRESS NOTES
Came back from [facility] accompanied by [facility] RN staff, alert and oriented x 4, on 02 at 2 l/nc at 95-97%, denies pain/discomfort, generalized jaundice, distended abdomen, call light within reach, up self, urinal at the bed side, offered full liquid after got an order from MD on call, readmitted with previous order activated, needs attended, will continue to monitor abdominal pain.

## 2017-12-08 NOTE — OR NURSING
1709: To ENDO from OR via gurney, sleeping, respirations spontaneous and non-labored via nasal trumpet orally.   1715: Sleeping, respirations spontaneous and non-labored via nasal trumpet orally.  1730: Still sleeping, respirations spontaneous and non-labored via nasal trumpet orally. BP low, medication given per MAR.  1745: Still sleeping, respirations spontaneous and non-labored via nasal trumpet orally.   1750: Pt awake, able to open mouth and nasal trumpet removed from pt's mouth, able to cough on request.   1800: Pt sleepy, but arouses to voice, no pain, no nausea.  1815: Pt more awake, still no pain or nausea. [facility] called and scheduled pickup is for 1840.  1830: Report called to Carolyne, CHANCE and RN from the main hospital brought back to bedside.   1845: Pt still comfortable, no pain, no nausea.   1850: [facility] here to transport pt, report given and pt readied for transfer.

## 2017-12-08 NOTE — PROGRESS NOTES
Gastroenterology Progress Note     Author: Rui Will   Date & Time Created: 12/8/2017 10:41 AM    CC: Obstructive jaundice, head of pancreas mass    Interval History:  67-year-old man followed by my group for hepatitis C and liver cirrhosis.  He was treated and cured of hepatitis C last year.  About 3 weeks ago, he developed jaundice and an ultrasound showed gallstones, mass in the head of the pancreas and a dilated duct.  He was scheduled for EUS and ERCP eval on 12/15th, but yesterday in the afternoon developed fairly severe steady epigastric pain without any radiation, nausea or vomiting.  On admission here, he has a bilirubin of 38.  An imaging shows massively dilated ducts, 5 cm pancreatic head lesion and dilated gallbladder with stones.  He has been told that he likely has pancreatic cancer.  He has noted dark urine for weeks and for the past several days, he has also noted some itching.  He developed diarrhea symptoms with foul greasy stools about 2 months ago.    12/07/2017 - EGD / EUS FNA celiac neurolysis / ERCP - Large head of panc mass with involvement/obliteration of ampulla.  Unable to find biliary orifice so ERCP aborted.  Preliminary pathology (+) for malignancy (highly suspicious)  12/08/2017 - Feeling okay.  Bilirubin up higher.  PTC and NPO orders didn't follow from [facility].  Reordered.  No nausea, vomiting.  Abdominal pain mildly improved post celiac neurolysis    Review of Systems:  Review of Systems   Constitutional: Negative.    HENT: Negative.    Respiratory: Negative.    Cardiovascular: Negative.    Gastrointestinal: Positive for abdominal pain. Negative for heartburn, nausea and vomiting.   Skin: Positive for itching.       Physical Exam:  Physical Exam   Constitutional: He is oriented to person, place, and time. He appears well-developed and well-nourished.   Jaundiced   HENT:   Head: Normocephalic and atraumatic.   Eyes: EOM are normal. Pupils are equal, round, and  reactive to light. Scleral icterus is present.   Neck: Neck supple.   Cardiovascular: Normal rate and regular rhythm.    Pulmonary/Chest: Effort normal and breath sounds normal.   Abdominal: Soft. Bowel sounds are normal.   Musculoskeletal: Normal range of motion.   Neurological: He is alert and oriented to person, place, and time.   Skin: Skin is warm and dry.   jaundiced   Psychiatric: He has a normal mood and affect. His behavior is normal. Thought content normal.   Nursing note and vitals reviewed.      Labs:        Invalid input(s): [id number]      Recent Labs      17   SODIUM  133*  135  138   POTASSIUM  3.8  4.1  3.6   CHLORIDE  101  104  105   CO2  21  21  22   BUN  11  14  18   CREATININE  0.98  0.93  0.93   MAGNESIUM   --   1.9   --    PHOSPHORUS   --   2.3*   --    CALCIUM  8.7  8.2*  8.6     Recent Labs      17   ALTSGPT  71*  54*  48   ASTSGOT  102*  82*  87*   ALKPHOSPHAT  353*  292*  283*   TBILIRUBIN  38.5*  32.3*  39.3*   LIPASE  158*   --    --    GLUCOSE  123*  91  117*     Recent Labs      17   RBC  3.32*  3.03*  2.87*   HEMOGLOBIN  10.5*  9.6*  9.2*   HEMATOCRIT  29.1*  26.8*  26.1*   PLATELETCT  220  185  193   PROTHROMBTM  20.0*   --    --    INR  1.74*   --    --      Recent Labs      17   0701   WBC  8.1   --   5.2  5.0   NEUTSPOLYS  84.30*   --   73.70*  82.60*   LYMPHOCYTES  4.80*   --   12.20*  9.60*   MONOCYTES  8.60   --   9.90  5.20   EOSINOPHILS  0.10   --   1.10  0.00   BASOPHILS  0.70   --   0.40  0.00   ASTSGOT  102*  82*   --   87*   ALTSGPT  71*  54*   --   48   ALKPHOSPHAT  353*  292*   --   283*   TBILIRUBIN  38.5*  32.3*   --   39.3*     Hemodynamics:  Temp (24hrs), Av.7 °C (98.1 °F), Min:36.3 °C (97.4 °F), Max:36.9 °C (98.5 °F)  Temperature: 36.9 °C (98.5 °F)  Pulse  Avg:  66.5  Min: 50  Max: 80   Blood Pressure : 106/70     Respiratory:    Respiration: 17, Pulse Oximetry: 99 %        RUL Breath Sounds: Diminished, RML Breath Sounds: Diminished, RLL Breath Sounds: Diminished, NARESH Breath Sounds: Diminished, LLL Breath Sounds: Diminished  Fluids:    Intake/Output Summary (Last 24 hours) at 12/08/17 1041  Last data filed at 12/08/17 0800   Gross per 24 hour   Intake              450 ml   Output              950 ml   Net             -500 ml        GI/Nutrition:  Orders Placed This Encounter   Procedures   • DIET NPO     Standing Status:   Standing     Number of Occurrences:   1     Order Specific Question:   Restrict to:     Answer:   Sips with Medications [3]     Medical Decision Making, by Problem:  Active Hospital Problems    Diagnosis   • *Obstructive jaundice [K83.8]   • Pancreatic cancer (CMS-HCC) [C25.9]   • Normocytic anemia [D64.9]   • Chronic respiratory failure with hypoxia (CMS-HCC) [J96.11]   • Hepatitis C [B19.20]   • COPD (chronic obstructive pulmonary disease) (CMS-HCC) [J44.9]   • Chronic pain [G89.29]       Impression / Plan:  1.  Obstructive jaundice  2.  A 5 cm pancreatic head mass  3.  New onset severe upper abdominal pain  4.  Pruritus  5.  Cirrhosis  6.  Hepatitis C, treated and cured in 2016  7.  Gallstones  8.  Chronic obstructive pulmonary disease, on 2-4 L of oxygen  9.  Anxiety  10.  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  His pancreatic cancer pain will be difficult to treat until the jaundice is alleviated  11.  Coagulopathy  12.  Coronary artery disease on imaging  13.  Pancreatic insufficiency diarrhea       1.  Follow up final pathology  2.  Correct the coags  3.  N.p.o.   4.  PTC today - hopefully with internalized stent  5.  He can be put on pancreatic enzymes after the bile duct distended if diarrhea persists      Reviewed items::  Labs reviewed, Radiology images reviewed and Medications reviewed

## 2017-12-08 NOTE — DISCHARGE PLANNING
Care Transition Team Assessment    IHD met with pt at bedside. Pt currently lives alone at home, but states that he will have a friend stay with him during his recovery if discharged to home. Pt does not have DME or HHC at this time, but does have home O2 provided by [facility]. Pt states that he will have his friend Rebecca provide transportation upon d/c if needed.     Information Source  Orientation : Oriented x 4  Information Given By: Patient  Informant's Name: Tj  Who is responsible for making decisions for patient? : Patient         Elopement Risk  Legal Hold: No  Ambulatory or Self Mobile in Wheelchair: Yes  Disoriented: No  Psychiatric Symptoms: None  History of Wandering: No  Elopement this Admit: No  Vocalizing Wanting to Leave: No  Displays Behaviors, Body Language Wanting to Leave: No-Not at Risk for Elopement  Elopement Risk: Not at Risk for Elopement         Discharge Preparedness  What is your plan after discharge?: Uncertain - pending medical team collaboration  What are your discharge supports?: Other (comment) (Friend)  Prior Functional Level: Ambulatory, Drives Self, Independent with Activities of Daily Living, Independent with Medication Management  Difficulity with ADLs: None  Difficulity with IADLs: None    Functional Assesment  Prior Functional Level: Ambulatory, Drives Self, Independent with Activities of Daily Living, Independent with Medication Management    Finances  Financial Barriers to Discharge: No  Prescription Coverage: Yes ([facility] [address])    Vision / Hearing Impairment  Right Eye Vision: Impaired, Wears Glasses  Left Eye Vision: Impaired, Wears Glasses                   Psychological Assessment  History of Substance Abuse: None  History of Psychiatric Problems: No  Non-compliant with Treatment: No    Discharge Risks or Barriers  Discharge risks or barriers?: No    Anticipated Discharge Information  Anticipated discharge disposition: Discharge needs currently unknown  Discharge  Address: unknown at this time  Discharge Contact Phone Number: [phone]

## 2017-12-08 NOTE — NON-PROVIDER
Palliative Progress Note               Author: France Arredondo Date & Time created: 12/8/2017  11:05 AM     Interval History:  Mr. Barrios is a 67 year old man admitted for abdominal pain, currently being evaluated for a 5cm mass at the large head of the pancreas with with involvement/obliteration of ampulla. 12/07, EGD/ EUS were done and FNA and celiac neurolysis were completed. ERCP and stent placement were aborted as they were unable to find biliary orifice. Preliminary pathology (+) for malignancy (highly suspicious). He has had no nausea or vomiting. His pain this morning was such that he had difficulty continuing a conversation. He did express that he was disappointed that the stent could not be placed and that he has decided he would like treatment.    Review of Systems:  Positive for pain. Negative for sedation. Negative for nausea and vomiting. Positive for constipation.        Physical Exam:  Physical Exam   Constitutional: He is oriented to person, place, and time. No distress.   Eyes: Scleral icterus is present.   Cardiovascular: Normal rate and regular rhythm.    Pulmonary/Chest: Effort normal and breath sounds normal.   Abdominal: He exhibits distension. There is tenderness.   Neurological: He is alert and oriented to person, place, and time.   Skin: Skin is warm and dry. Rash noted. He is not diaphoretic.   Jaundice   Psychiatric: He has a normal mood and affect.       Lab Results:  Recent Labs      12/06/17   0136  12/07/17   0214  12/08/17   0701   SODIUM  133*  135  138   POTASSIUM  3.8  4.1  3.6   CHLORIDE  101  104  105   CO2  21  21  22   GLUCOSE  123*  91  117*   BUN  11  14  18   CREATININE  0.98  0.93  0.93   CALCIUM  8.7  8.2*  8.6     Recent Labs      12/06/17   0136  12/07/17   0318  12/08/17   0701   WBC  8.1  5.2  5.0   RBC  3.32*  3.03*  2.87*   HEMOGLOBIN  10.5*  9.6*  9.2*   HEMATOCRIT  29.1*  26.8*  26.1*   MCV  87.7  88.4  90.9   MCH  31.6  31.7  32.1   MCHC  36.1*  35.8*  35.2    RDW  77.1*  79.1*  79.6*   PLATELETCT  220  185  193   MPV  11.2  11.4  11.3         Hemodynamics:  Temp (24hrs), Av.7 °C (98.1 °F), Min:36.3 °C (97.4 °F), Max:36.9 °C (98.5 °F)  Temperature: 36.9 °C (98.5 °F)  Pulse  Av.7  Min: 54  Max: 78   Blood Pressure : 106/70       Medications:  • gabapentin  600 mg     • nicotine  21 mg     • enoxaparin  40 mg     • senna-docusate  2 Tab     • methadone  80 mg     • methadone  20 mg         Problem List:  1. Epigastric pain - likely caused by pancreatic mass  2. Obstructive Jaundice causing pruritis   3. Pancreatic mass -5 CM  4. COPD - previous tobacco use  5. Hyperbilirubinemia  6. Hepatitis C  7. H/O IVDU -heroin  8. Methadone maintenance  9. PVD   10. Peripheral Neuropathy     Plan:     1. Abdominal Pain  Celliac plexus block was done , which should provide an overall improvement. His 9 year history of methadone use has given him a significant tolerance to opiate pain medication. We recommend increasing his dose of Dilaudid to 2.0mg Q3 hrs because of this tolerance.      2. Pancreatic Mass  FNA of the mass was done . Once pathology is obtained, consult with Oncology to discuss treatment options and approach. Because stenting was not possible yesterday, PTC today with internalized stent for hepatic drainage.      Code status discussed:    Transition planning discussed:    Goals of care addressed:    Total visit time 15 minutes with 50% of the time spent with counseling and case coordination.    Medications reviewed. Labs Reviewed.

## 2017-12-08 NOTE — PROCEDURES
DATE OF SERVICE:  12/07/2017    PROCEDURES:  1.  EGD with biopsy.  2.  Endoscopic ultrasound with fine needle aspiration and celiac plexus   neurolysis.  3.  ERCP attempted but failed due to inability to identify biliary orifice due   to tumor ingrowth.  Radiologic interpretation of static and dynamic   fluoroscopic images for scope placement performed by myself at no time was   radiologist present in the room.    INDICATION:  Head of pancreas mass, obstructive jaundice, abdominal pain.    FINAL IMPRESSION:  ESOPHAGOGASTRODUODENOSCOPY FINDINGS:  1.  Suspected candidiasis, biopsied.  2.  Stomach, unremarkable mucosa.  3.  Duodenal mass fungating and friable 4 cm.  At level of the ampulla easily   bleeding with contact.  4.  Otherwise, unremarkable duodenal mucosa proximal and distal to the mass.    ENDOSCOPIC ULTRASOUND FINDINGS:  1.  Celiac axis, extensive bulky adenopathy.  Fine needle aspiration   performed.  2.  Pancreatic body and tail, atrophic gland, hypoechoic heterogeneous.  3.  Pancreatic duct dilated and ectatic through to the tail.  4.  Head of the pancreas, heterogeneous hypoechoic invasive into the duodenum.    Fine needle aspiration performed.  5.  PRELIMINARY PATHOLOGY POSITIVE FOR MALIGNANCY.  6.  Biliary ampulla replaced by tumor.  7.  Common bile duct dilated upstream from the pancreatic mass.    ERCP FINDINGS:  1.  Biliary ampulla replaced by tumor fungating and friable with contact   bleeding with any manipulation.  Unable to identify biliary or pancreatic   orifice.  2.  Incomplete ERCP.    THERAPEUTIC:  Celiac plexus neurolysis performed in the usual fashion.    RECOMMENDATIONS:  1.  Follow up final pathology.  2.  Return to [facility] when criteria met.  3.  Percutaneous cholangiogram with external versus internal drain ordered.  4.  Follow liver enzymes.  5.  Oncology evaluation.    DESCRIPTION OF PROCEDURE:  Prior to the procedure, vital signs were stable.    Prior to  sedation, informed consent was obtained.  Risks, benefits and   alternatives including but not limited to risk of bleeding, infection,   perforation, adverse reaction to medication, failure to identify pathology,   pancreatitis and death explained to the patient who accepted all risks.    Patient was intubated and sedated, prepped in the prone position.    EGD:  Scope tip of the Olympus flexible gastroscope passed in the proximal   esophagus.  Proximal middle and distal thirds of the esophagus were well   visualized, there were white plaques throughout consistent with candidiasis.    Biopsies were taken.  Stomach was entered.  Gastric pool suctioned, air   insufflated, scope retroflexed to view the body, fundus, and cardia, then   straightened to view the antrum and incisura, which were grossly unremarkable.    The pylorus was patent.  The duodenal bulb was entered.  There was extrinsic   compression of the duodenal bulb and sweep suspicious for head of the   pancreas mass.  The overlying mucosa was unremarkable.  In the second portion   of the duodenum, there was a fungating friable mass measuring approximately 4   cm with central ulceration at the level of the ampulla.  This was not   initially bleeding, but with any manipulation of the scope tip whatsoever   there was contact bleeding seen.  The mucosa distal to this was unremarkable.    This was nonobstructive at this time.  The gastroscope was withdrawn and we   proceed with endoscopic ultrasound.    EUS:  A flexible radial echoendoscope was passed into the proximal stomach.    Imaging of the celiac axis demonstrated bulky adenopathy throughout with a   rounded suspicious appearance.  The larger lymph node measured approximately   1.73 cm.  Multiple lymph nodes were in this area.  The body of the pancreas   was quite atrophic and heterogeneous and hypoechoic.  The pancreatic duct was   dilated and ectatic through to the tail measuring approximately 4.5 mm in  the   body.  The scope was advanced into the duodenum.  The biliary ampulla was   completely replaced with tumor which was contiguous with the head of pancreas   mass, which was invasive into the duodenum.  This had irregular invasive   borders, vascularity was not well seen, but there was clear invasion of the   portal vein.  The lesion was heterogeneous and hypoechoic.  It measured   48.9x32.4 mm.  The common bile duct was dilated upstream from this mass.  The   scope was withdrawn and the flexible linear echoendoscope passed to the level   of the head of the pancreas where multiple passes with multiple throws were   made with a 22-gauge SharkCore needle under endoscopic and direct   visualization.  This was sent to pathology.  The scope was then withdrawn back   to the celiac axis and 2 separate passes with multiple throws were made into   the adenopathy.  At this time, the pathologist called the room and reported   that the pancreatic head mass was highly suspicious for malignancy and was   able to make the call after my clinical description.  The linear echoendoscope   was then withdrawn and we proceeded with attempted ERCP.    The side-viewing TGF flexible duodenoscope was passed to the level of the   ampulla in the short position.  The ampulla was clearly replaced by tumor, it   was fungating and friable.  There was blood clot and fresh blood from   preceding fine needle aspiration.  There was slight bile staining and   extensive examination of the tumor was made with prodding with the cannula and   positioning utilizing fluoroscopic guidance of the scope.  The tip of the   cannula seem to sink in to the ampullary tumor with a sensation of being in   the duct, but the wire would not pass and multiple times a wire passed   straight through the lesion and out through visualized adjacent tissue.  After   multiple attempts and careful visualization along with continued oozing of   blood, it was felt that the  better part of valor was to stop the procedure   rather than risk wire perforation and intraabdominal infection or other   complications.  Procedure was deemed complete.  The scope was withdrawn.  Air   and liquid were suctioned.  Patient tolerated the procedure well and was sent   to recovery without immediate complications.       ____________________________________     MD LEANDRO COULTER / DIPIKA    DD:  12/07/2017 17:18:37  DT:  12/07/2017 17:52:50    D#:  [id number]  Job#:  [id number]

## 2017-12-08 NOTE — DISCHARGE PLANNING
Clinical Note per Chart Review:   FFP 2 units See Orders  Hgb: 9.2, HCT 26.1% INR:1.74  Celiac Plexus Block for pain control  biopsy obtained pending results.  Oncology Consult  Palliative Consult    Discharge Plan: Continued POC includes pain management, decision r/t prognosis, and pt's decision to pursue further treatment, or comfort care.  SW will continue to secure discharge resources as appropriate.

## 2017-12-08 NOTE — PROGRESS NOTES
Author: France Arredondo Date & Time created: 2017  3:02 PM      Interval History:  Mr. Barrios is a 67 year old man admitted for abdominal pain, currently being evaluated for a 5cm pancreatic mass with obstruction. He was transported to [facility] for EUS and ERCP this afternoon and will return to [facility] after the procedure. His pain today has improved with dilaudid and methadone.      Review of Systems:  Positive for pain. Positive for anxiety. Positive for constipation.         Physical Exam:  Physical Exam   Constitutional: He is oriented to person, place, and time. He appears well-nourished. No distress.   Eyes: Scleral icterus is present.   Abdominal: He exhibits distension. There is tenderness.   Neurological: He is alert and oriented to person, place, and time.   Skin: Skin is warm and dry. Rash noted. He is not diaphoretic.   jaundice   Psychiatric: He has a normal mood and affect.         Lab Results:       Recent Labs      17   0136  17   0214   SODIUM  133*  135   POTASSIUM  3.8  4.1   CHLORIDE  101  104   CO2  21  21   GLUCOSE  123*  91   BUN  11  14   CREATININE  0.98  0.93   CALCIUM  8.7  8.2*           Recent Labs      17   0136  17   0318   WBC  8.1  5.2   RBC  3.32*  3.03*   HEMOGLOBIN  10.5*  9.6*   HEMATOCRIT  29.1*  26.8*   MCV  87.7  88.4   MCH  31.6  31.7   MCHC  36.1*  35.8*   RDW  77.1*  79.1*   PLATELETCT  220  185   MPV  11.2  11.4            Hemodynamics:  Temp (24hrs), Av.8 °C (98.2 °F), Min:36.6 °C (97.8 °F), Max:36.9 °C (98.5 °F)  Temperature: 36.6 °C (97.8 °F)  Pulse  Av.9  Min: 50  Max: 80   Blood Pressure : 107/67        Medications:  • methadone  80 mg       And   • methadone  20 mg     • senna-docusate  2 Tab     • enoxaparin  40 mg     • nicotine  21 mg     • tiotropium  1 Cap     • phytonadione  10 mg     • gabapentin  300 mg           Problem List:  1. Epigastric pain - likely caused by pancreatic mass  2. Obstructive Jaundice  causing pruritis   3. Pancreatic mass -5 CM  4. COPD - previous tobacco use  5. Hyperbilirubinemia  6. Hepatitis C  7. H/O IVDU -heroin  8. Methadone maintenance  9. PVD   10. Peripheral Neuropathy     Plan:     1. Abdominal Pain  His pain is improved today and is better controlled with oxycodone, Dilaudid, Methadone 100 and gabapentin.  His 9 year history of methadone use has given him a significant tolerance to opiate pain medication. His pain will therefore be difficult to manage and the celliac plexus block is an excellent option.      2. Pancreatic Mass  He has gone for EUS today for FNA of the mass, bile duct stenting and a celiac block. He is leaning towards comfort care, but would like to discuss chemotherapy options. Once pathology is obtained, we recommend a consult with Oncology to discuss this.     Code status discussed:     Transition planning discussed:     Goals of care addressed:     Total visit time 15 minutes with 50% of the time spent with counseling and case coordination.     Medications reviewed. Labs Reviewed.

## 2017-12-08 NOTE — DIETARY
"Nutrition Services: Patient seen for weight loss prior to admit    67 y.o. male with admitting DX of Probable Pancreatic cancer  Pertinent History: hepatitis C, COPD, Arthritis, BPH    Height: 177.8 cm (5' 10\")  Weight: 99.8 kg (220 lb)  BMI:  31.5     Patient is on  NPO.  He is off the floor for a procedure, but will start PO diet after test per MD report.  Labs, medications and past medical history reviewed. Palliative care following.    Recommend:  Will f/u when patient is available. Nutrition Representative will see him for meal and snack preferences.    RD following plan of care.    "

## 2017-12-08 NOTE — RESPIRATORY CARE
COPD EDUCATION by COPD CLINICAL EDUCATOR  12/8/2017 at 7:06 AM by Olivia Ly     Patient reviewed by COPD education team. Patient does not qualify for COPD program.

## 2017-12-09 NOTE — OR SURGEON
Immediate Post- Operative Note        PostOp Diagnosis: Pancreatic head mass with biliary obstruction.       Procedure(s): PTBD.       Estimated Blood Loss: Less than 5 ml        Complications: None            12/9/2017     0835 AM     Foster Pickering

## 2017-12-09 NOTE — CARE PLAN
Problem: Bowel/Gastric:  Goal: Will not experience complications related to bowel motility    Intervention: Implement interventions to promote bowel evacuation if inadequate bowel movements in past 48 hours  Pt is actively receiving bowel protocol.  Pt could not have miralax due to NPO status (sips with meds).  Pt requested suppository late in the day today. NOC RN aware and will give tonight.       Problem: Knowledge Deficit  Goal: Knowledge of disease process/condition, treatment plan, diagnostic tests, and medications will improve    Intervention: Explain information regarding disease process/condition, treatment plan, diagnostic tests, and medications and document in education  Explained results of yesterdays procedure to pt ..  Pt is anxious regarding last night and also regarding todays pending PTC.  Updated on the POC.  Documented in edu

## 2017-12-09 NOTE — PROGRESS NOTES
Assumed care of patient @ 0700, report received at bedside,  assessment done, labs and orders noted.  Pt A & Ox4, PIV running maintenance fluids as per MAR.  Pt is resting comfortably in bed, no signs or symptoms of distress.  Pt reports pain is a 9/10, provided dilaudid as per MAR.  Pt has been updated on the plan of care. Pt is scheduled for PTC at 3pm, pt is NPO.   Bed is in lowest position, fall risk socks in place, call light within reach. Pt verbalized all needs are met at this time.

## 2017-12-09 NOTE — PROGRESS NOTES
Received alert and oriented  X 4, c/o abdominal pain 8/10 pain level, abdomen distented, jaundice, - BM, new iv acces in placed, ivf continued, instructed NPO at midnight, call light within reach, SBA, refused bed alarm despite health teaching, call appropriately, needs attended, will continue to monitor.

## 2017-12-09 NOTE — PROGRESS NOTES
IR Nursing Note    Patient underwent percutaneous transhepatic cholangiogram by Dr. Pickering. Pt remained hemodynamically stable during procedure. C)2 waveform capnography was monitored during procedure and remained 14-32.  Report called to Glenny FLETCHER. Pt transported by bed to [facility]. 6cc green aspirate hand delivered to lab.   [employer] Flexima RO Regular Biliary drain Right abdomen taped in. 8F x 35 cm Ref # [id number] Lot # [id number]

## 2017-12-09 NOTE — PROGRESS NOTES
[facility] Hospitalist Progress Note          Chief Complaint  67M w hx of hep C s/p treatment ([facility]), chronic resp failure with hypoxia due to COPD, admitted with worsening abdominal pain found to have 5cm pancreatic mass w obstruction.       Interval Problem Update  : Plan for EUS at [facility], pain is improved w dilaudid and methadone.  Pending biopsy results prior to onc consult    : Unsuccessful stent under EUS, planned for IR placement today.  Biopsy taken, results pending.  INR elevated, 2U FFP given    : Stent placed in AM.  Bili still >39, marked jaundice.  Pain w relatively good control.  Becoming more confused/anxious     Consultants/Specialty  Dr. Alvarez - GI     Disposition  Biopsy path, consult onc when back  Bili in AM after stent         Review of Systems   Constitutional: Negative for chills, fever and +malaise/fatigue.   HENT: Negative for sore throat.    Respiratory: Negative for cough and shortness of breath.    Cardiovascular: Negative for chest pain and palpitations.   Gastrointestinal: Positive for abdominal pain. Negative for blood in stool, diarrhea, heartburn, nausea and vomiting.   Genitourinary: Negative for dysuria and frequency.   Musculoskeletal: Negative for back pain and myalgias.   Neurological: Negative for dizziness, focal weakness, weakness and headaches.   Psychiatric/Behavioral: Positive for anxious, memory loss (Difficulty finding words). Occasionally confused.  Negative for depression and hallucinations.   All other systems reviewed and are negative.      Physical Exam   Laboratory/Imaging   Hemodynamics  Temp (24hrs), Av.8 °C (98.2 °F), Min:36.6 °C (97.8 °F), Max:36.9 °C (98.5 °F)   Temperature: 36.6 °C (97.8 °F)  Pulse  Av.9  Min: 50  Max: 80    Blood Pressure : 107/67       Respiratory      Respiration: 17, Pulse Oximetry: 98 %        RUL Breath Sounds: Diminished, RML Breath Sounds: Diminished, RLL Breath Sounds: Diminished, NARESH Breath Sounds: Diminished, LLL  Breath Sounds: Diminished     Fluids     Intake/Output Summary (Last 24 hours) at 12/07/17 1304  Last data filed at 12/07/17 0800    Gross per 24 hour   Intake                0 ml   Output              575 ml   Net             -575 ml         Nutrition        Orders Placed This Encounter   Procedures   • DIET NPO       Standing Status:   Standing       Number of Occurrences:   8       Order Specific Question:   Type:       Answer:   At Midnight [5]       Order Specific Question:   Restrict to:       Answer:   Sips with Medications [3]      Physical Exam   Constitutional: He is oriented to person, place, and time. He appears well-developed and well-nourished. Ill appearing  HENT:   Head: Normocephalic.   Mouth/Throat: No oropharyngeal exudate.   Eyes: Conjunctivae are normal. Pupils are equal, round, and reactive to light. Scleral icterus is present.   Neck: Normal range of motion. No tracheal deviation present.   Cardiovascular: Normal rate and regular rhythm.    No murmur heard.  Pulmonary/Chest: Effort normal. No respiratory distress. He has no wheezes. He exhibits no tenderness.   Abdominal: Soft. He exhibits distension. There is tenderness. There is guarding.   Musculoskeletal: Normal range of motion. He exhibits no edema or tenderness.   Lymphadenopathy:     He has no cervical adenopathy.   Neurological: He is alert and oriented to person, place, and time. No cranial nerve deficit.   Skin: Skin is warm and dry. No rash noted.   Jaundiced   Psychiatric: Anxious, tangential.  Pleasant  Nursing note and vitals reviewed.           Recent Labs      12/06/17   0136  12/07/17   0318   WBC  8.1  5.2   RBC  3.32*  3.03*   HEMOGLOBIN  10.5*  9.6*   HEMATOCRIT  29.1*  26.8*   MCV  87.7  88.4   MCH  31.6  31.7   MCHC  36.1*  35.8*   RDW  77.1*  79.1*   PLATELETCT  220  185   MPV  11.2  11.4           Recent Labs      12/06/17   0136  12/07/17   0214   SODIUM  133*  135   POTASSIUM  3.8  4.1   CHLORIDE  101  104   CO2  21   21   GLUCOSE  123*  91   BUN  11  14   CREATININE  0.98  0.93   CALCIUM  8.7  8.2*          Recent Labs      12/06/17   0136   INR  1.74*                  Assessment/Plan           * Obstructive jaundice- (present on admission)   Assessment & Plan     Secondary to obstructive pancreatic cancer, marked bili elevation >38  IR placed stnet 12/9  EUS done w biopsy taken on 12/7 - results pending          Pancreatic cancer (CMS-HCC)- (present on admission)   Assessment & Plan     Advanced, likely with metastasis to the liver as seen on CT imaging.  He does have a hx of HCC and has been treated/followed by [facility].  Scan in 6/2017 showed reactive peripancreatic lymph notes which could have suggested CA at that time.  Path still pending but tumor markers are elevated.  Patient at this point would like to answer questions re: chemotherapy options but is leaning towards comfort care  12/7: EUS, w celaic block and biopsy  12/9: IR for stent placement  Pain control with oxycodone and Dilaudid  Methadone 100  Palliative care is following, discussed case w them       Normocytic anemia   Assessment & Plan     No e/o blood loss, likely from malignancy  -Monitor       Hyponatremia   Assessment & Plan     Improved   Repeat in AM       Chronic respiratory failure with hypoxia (CMS-HCC)   Assessment & Plan     Due to COPD, wears 2L at baseline  -No acute complaints  -Continue home inhaler regimen       Hepatitis C- (present on admission)   Assessment & Plan     S/p treatment still w cirrhosis, followed by GI       COAGULOPATHY  Due to cirrhosis/obstructive jaundice.  FFP given prior to stent  -Monitor  -No e/o bleeding     COPD (chronic obstructive pulmonary disease) (CMS-HCC)- (present on admission)   Assessment & Plan     Currently in no acute exacerbation  Continue Spiriva  Breathing treatments when necessary  RT protocol             Reviewed items::  EKG reviewed, Labs reviewed, Medications reviewed and Radiology images  reviewed  Scales catheter::  No Scales

## 2017-12-09 NOTE — PROGRESS NOTES
Pt back from procedure, states he is feeling better, still with c/o pain, medicated with scheduled pain meds. Drain to RUQ, site wnl, dark green output, bag to gravity. Vss. Pt denies other needs at this time. Call light in reach.

## 2017-12-09 NOTE — PROGRESS NOTES
Savannah Torres Fall Risk Assessment:     Last Known Fall: No falls  Mobility: No limitations  Medications: Cardiovascular or central nervous system meds  Mental Status/LOC/Awareness: Awake, alert, and oriented to date, place, and person  Toileting Needs: No needs  Volume/Electrolyte Status: Use of IV fluids/tube feeds  Communication/Sensory: Visual (Glasses)/hearing deficit  Behavior: Appropriate behavior  Savannah Torres Fall Risk Total: 7  Fall Risk Level: LOW RISK    Universal Fall Precautions:  call light/belongings in reach, bed in low position and locked, wheelchairs and assistive devices out of sight, siderails up x 2, use non-slip footwear, adequate lighting, educate on level of risk, clutter free and spill free environment, educate to call for assistance    Fall Risk Level Interventions:   TRIAL (TELE 8, NEURO, MED HAROON 5) Low Fall Risk Interventions  Place yellow fall risk ID band on patient: verified  Provide patient/family education based on risk assessment: completed  Educate patient/family to call staff for assistance when getting out of bed: completed  Place fall precaution signage outside patient door: verified      Patient Specific Interventions:     Medication: review medications with patient and family, assess for medications that can be discontinued or dosage decreased and limit combination of prn medications  Mental Status/LOC/Awareness: reinforce falls education, check on patient hourly, reinforce the use of call light and provide activity  Toileting: monitor intake and output/use of appropriate interventions and instruct patient/family on the need to call for assistance when toileting  Volume/Electrolyte Status: ensure patient remains hydrated, advance diet as tolerated, monitor abnormal lab values and ensure IV fluids are appropriate  Communication/Sensory: update plan of care on whiteboard, ensure proper positioning when transferrng/ambulating and ensure patient has glasses/contacts and hearing  aids/dentures  Behavioral: encourage patient to voice feelings, engage patient in daily activities and administer medication as ordered  Mobility: schedule physical activity throughout the day, ensure bed is locked and in lowest position, provide appropriate assistive device and instruct patient to exit bed on their strongest side

## 2017-12-09 NOTE — CARE PLAN
Problem: Knowledge Deficit  Goal: Knowledge of disease process/condition, treatment plan, diagnostic tests, and medications will improve    Intervention: Explain information regarding disease process/condition, treatment plan, diagnostic tests, and medications and document in education  Health teaching given regarding IR PTC preparation today 12/9/2017.      Problem: Pain Management  Goal: Pain level will decrease to patient's comfort goal    Intervention: Follow pain managment plan developed in collaboration with patient and Interdisciplinary Team  Due med given for pain and reassessed after 2 hours, able to sleep and relaxed after medicated.

## 2017-12-09 NOTE — PROGRESS NOTES
Savannah Torres Fall Risk Assessment:     Last Known Fall: No falls  Mobility: No limitations  Medications: Cardiovascular or central nervous system meds  Mental Status/LOC/Awareness: Awake, alert, and oriented to date, place, and person  Toileting Needs: No needs  Volume/Electrolyte Status: Use of IV fluids/tube feeds  Communication/Sensory: Visual (Glasses)/hearing deficit  Behavior: Appropriate behavior  Savannah Torres Fall Risk Total: 9  Fall Risk Level: LOW RISK     Universal Fall Precautions:  call light/belongings in reach, bed in low position and locked, wheelchairs and assistive devices out of sight, siderails up x 2, use non-slip footwear, adequate lighting, educate on level of risk, clutter free and spill free environment, educate to call for assistance     Fall Risk Level Interventions:   TRIAL (TELE 8, NEURO, MED HAROON 5) Low Fall Risk Interventions  Place yellow fall risk ID band on patient: verified  Provide patient/family education based on risk assessment: completed  Educate patient/family to call staff for assistance when getting out of bed: completed  Place fall precaution signage outside patient door: verified       Patient Specific Interventions:      Medication: review medications with patient and family, assess for medications that can be discontinued or dosage decreased and limit combination of prn medications  Mental Status/LOC/Awareness: reinforce falls education, check on patient hourly, reinforce the use of call light and provide activity  Toileting: monitor intake and output/use of appropriate interventions and instruct patient/family on the need to call for assistance when toileting  Volume/Electrolyte Status: ensure patient remains hydrated, advance diet as tolerated, monitor abnormal lab values and ensure IV fluids are appropriate  Communication/Sensory: update plan of care on whiteboard, ensure proper positioning when transferrng/ambulating and ensure patient has glasses/contacts and  hearing aids/dentures  Behavioral: encourage patient to voice feelings, engage patient in daily activities and administer medication as ordered  Mobility: schedule physical activity throughout the day, ensure bed is locked and in lowest position, provide appropriate assistive device and instruct patient to exit bed on their strongest side

## 2017-12-09 NOTE — CARE PLAN
Problem: Infection  Goal: Will remain free from infection  Pt educated on s/s infections, infection prevention/protocols, and hand washing. Pt reinforced prn.

## 2017-12-09 NOTE — CARE PLAN
Problem: Pain Management  Goal: Pain level will decrease to patient's comfort goal  Outcome: PROGRESSING AS EXPECTED  Pt has scheduled methadone (hx of 9 years of use).  Pain not well controlled on oxys.  Oxys DC'd

## 2017-12-09 NOTE — PROGRESS NOTES
Palliative Care Progress Note    Author: Arcenio Miguelito    Date & Time note created:    12/8/2017   6:32 PM       Interval History:    Patient was seen at bedside at 9 AM.  Celiac plexus block has offered patient some level of pain imrovement but this morning he described the pain is at 9/10 that he would not be able to continue his conversation with us.  He is very anxious about the pending biopsy this afternoon and after his morning breakthrough dose he was able to continue his conversation and shared his wishes to be seen by Oncology and expressed verbal understanding after our discussion.         Review of Systems:     Review of Systems   Constitutional: Negative for fever.   Gastrointestinal: Positive for abdominal pain.   Psychiatric/Behavioral: Positive for memory loss. The patient is nervous/anxious.        Physical Exam:  Physical Exam   Constitutional: He appears distressed.   No longer in distress after the breakthrough medication even though he still would rate his pain at 7/10   HENT:   Head: Normocephalic and atraumatic.   Pulmonary/Chest: No respiratory distress.   Abdominal: There is tenderness.   Neurological: He is alert.   Skin: Skin is warm.       Vitals:  Weight/BMI: There is no height or weight on file to calculate BMI.  /75   Pulse (!) 57   Temp 36.9 °C (98.4 °F)   Resp (!) 22   SpO2 99%   Vitals:    12/08/17 0400 12/08/17 0800 12/08/17 1200 12/08/17 1600   BP: 100/62 106/70 115/70 120/75   Pulse: 62 (!) 54 (!) 51 (!) 57   Resp: 18 17 16 (!) 22   Temp: 36.3 °C (97.4 °F) 36.9 °C (98.5 °F) 36.4 °C (97.6 °F) 36.9 °C (98.4 °F)   SpO2: 98% 99% 99% 99%     Oxygen Therapy:  Pulse Oximetry: 99 %, O2 (LPM): 2, O2 Delivery: Silicone Nasal Cannula      Lab Data Review:  Recent Results (from the past 24 hour(s))   ABO AND RH DETERMINATION    Collection Time: 12/08/17  6:43 AM   Result Value Ref Range    ABO Grouping Only A     Rh Grouping Only POS    FRESH FROZEN PLASMA    Collection Time:  12/08/17  6:43 AM   Result Value Ref Range    Component Ft       TA1                 Thawed Plasma 1     [id number]   issued       12/08/17   12:00      Product Type Thawed Apheresis Plasma 1st Cont     Dispense Status Issued     Unit Number (Barcoded) [id number]     Product Code (Barcoded) K4144T33     Blood Type (Barcoded) 6200    COMP METABOLIC PANEL    Collection Time: 12/08/17  7:01 AM   Result Value Ref Range    Sodium 138 135 - 145 mmol/L    Potassium 3.6 3.6 - 5.5 mmol/L    Chloride 105 96 - 112 mmol/L    Co2 22 20 - 33 mmol/L    Anion Gap 11.0 0.0 - 11.9    Glucose 117 (H) 65 - 99 mg/dL    Bun 18 8 - 22 mg/dL    Creatinine 0.93 0.50 - 1.40 mg/dL    Calcium 8.6 8.5 - 10.5 mg/dL    AST(SGOT) 87 (H) 12 - 45 U/L    ALT(SGPT) 48 2 - 50 U/L    Alkaline Phosphatase 283 (H) 30 - 99 U/L    Total Bilirubin 39.3 (H) 0.1 - 1.5 mg/dL    Albumin 2.7 (L) 3.2 - 4.9 g/dL    Total Protein 5.0 (L) 6.0 - 8.2 g/dL    Globulin 2.3 1.9 - 3.5 g/dL    A-G Ratio 1.2 g/dL   CBC WITH DIFFERENTIAL    Collection Time: 12/08/17  7:01 AM   Result Value Ref Range    WBC 5.0 4.8 - 10.8 K/uL    RBC 2.87 (L) 4.70 - 6.10 M/uL    Hemoglobin 9.2 (L) 14.0 - 18.0 g/dL    Hematocrit 26.1 (L) 42.0 - 52.0 %    MCV 90.9 81.4 - 97.8 fL    MCH 32.1 27.0 - 33.0 pg    MCHC 35.2 33.7 - 35.3 g/dL    RDW 79.6 (H) 35.9 - 50.0 fL    Platelet Count 193 164 - 446 K/uL    MPV 11.3 9.0 - 12.9 fL    Nucleated RBC 0.00 /100 WBC    NRBC (Absolute) 0.00 K/uL    Neutrophils-Polys 82.60 (H) 44.00 - 72.00 %    Lymphocytes 9.60 (L) 22.00 - 41.00 %    Monocytes 5.20 0.00 - 13.40 %    Eosinophils 0.00 0.00 - 6.90 %    Basophils 0.00 0.00 - 1.80 %    Neutrophils (Absolute) 4.26 1.82 - 7.42 K/uL    Lymphs (Absolute) 0.48 (L) 1.00 - 4.80 K/uL    Monos (Absolute) 0.26 0.00 - 0.85 K/uL    Eos (Absolute) 0.00 0.00 - 0.51 K/uL    Baso (Absolute) 0.00 0.00 - 0.12 K/uL    Anisocytosis 2+     Macrocytosis 2+    ESTIMATED GFR    Collection Time: 12/08/17  7:01 AM   Result Value  Ref Range    GFR If African American >60 >60 mL/min/1.73 m 2    GFR If Non African American >60 >60 mL/min/1.73 m 2   DIFFERENTIAL MANUAL    Collection Time: 12/08/17  7:01 AM   Result Value Ref Range    Bands-Stabs 2.60 0.00 - 10.00 %    Manual Diff Status PERFORMED    PERIPHERAL SMEAR REVIEW    Collection Time: 12/08/17  7:01 AM   Result Value Ref Range    Peripheral Smear Review see below    PLATELET ESTIMATE    Collection Time: 12/08/17  7:01 AM   Result Value Ref Range    Plt Estimation Normal    MORPHOLOGY    Collection Time: 12/08/17  7:01 AM   Result Value Ref Range    RBC Morphology Present     Polychromia 1+     Poikilocytosis 2+     Target Cells 2+    PROTHROMBIN TIME    Collection Time: 12/08/17  2:38 PM   Result Value Ref Range    PT 15.1 (H) 12.0 - 14.6 sec    INR 1.22 (H) 0.87 - 1.13   COD (ADULT)    Collection Time: 12/08/17  2:38 PM   Result Value Ref Range    ABO Grouping Only A     Rh Grouping Only POS     Antibody Screen-Cod NEG        Imaging/Procedures Review:    IR-PTC (INCLUDES ALL RADIOLOGY)    (Results Pending)          Problem List:  1. Epigastric pain - likely caused by pancreatic mass  2. Obstructive Jaundice causing pruritis   3. Pancreatic mass -5 CM  4. COPD - previous tobacco use  5. Hyperbilirubinemia  6. Hepatitis C  7. H/O IVDU -heroin  8. Methadone maintenance  9. PVD   10. Peripheral Neuropathy     Plan:     Patient has a failed stent placement attempt and the pain was increased this morning during our visit to the patient.  We would recommend increase his breakthrough dose of Dilaudid to up to 0.5mg to 2 mg IV every 3 hours as needed.  Normalized patient's feeling and anxiety and over 25 minutes of discussion of the biopsy rationale before oncology consult would occure.  As his pain level reduces we should consider reduce the upper limit of the breakthrough.    Total visit time 25 minutes with 50% of the time spent with counseling and case coordination.     Medications  reviewed. Labs Reviewed.

## 2017-12-10 NOTE — PROGRESS NOTES
Rounding completed on pt. Report given at bedside between night shift RN and day shift RN. Pt resting quietly, requesting pain meds, IV dilaudid, and suppository today. Pt denies other needs at this time, medicated per orders. Call light in reach. Assumed care of pt.

## 2017-12-10 NOTE — PROGRESS NOTES
[facility] Hospitalist Progress Note          Chief Complaint  67M w hx of hep C s/p treatment ([facility]), chronic resp failure with hypoxia due to COPD, admitted with worsening abdominal pain found to have 5cm pancreatic mass w obstruction.       Interval Problem Update  : Plan for EUS at [facility], pain is improved w dilaudid and methadone.  Pending biopsy results prior to onc consult    : Unsuccessful stent under EUS, planned for IR placement today.  Biopsy taken, results pending.  INR elevated, 2U FFP given    : Stent placed in AM.  Bili still >39, marked jaundice.  Pain w relatively good control.  Becoming more confused/anxious    12/10: Bili improved after bili drain placed.  Constipated. Pending path     Consultants/Specialty  Dr. Alvarez - GI     Disposition  Biopsy path, consult onc when back  Bili in AM after stent         Review of Systems   Constitutional: Negative for chills, fever and +malaise/fatigue.   HENT: Negative for sore throat.    Respiratory: Negative for cough and shortness of breath.    Cardiovascular: Negative for chest pain and palpitations.   Gastrointestinal: Positive for abdominal pain, constipated. Negative for blood in stool, diarrhea, heartburn, nausea and vomiting.   Genitourinary: Negative for dysuria and frequency.   Musculoskeletal: Negative for back pain and myalgias.   Neurological: Negative for dizziness, focal weakness, weakness and headaches.   Psychiatric/Behavioral: Positive for anxious, memory loss (Difficulty finding words). Occasionally confused.  Negative for depression and hallucinations.   All other systems reviewed and are negative.      Physical Exam   Laboratory/Imaging   Hemodynamics  Temp (24hrs), Av.8 °C (98.2 °F), Min:36.6 °C (97.8 °F), Max:36.9 °C (98.5 °F)   Temperature: 36.6 °C (97.8 °F)  Pulse  Av.9  Min: 50  Max: 80    Blood Pressure : 107/67       Respiratory      Respiration: 17, Pulse Oximetry: 98 %        RUL Breath Sounds: Diminished, RML  Breath Sounds: Diminished, RLL Breath Sounds: Diminished, NARESH Breath Sounds: Diminished, LLL Breath Sounds: Diminished     Fluids     Intake/Output Summary (Last 24 hours) at 12/07/17 1304  Last data filed at 12/07/17 0800    Gross per 24 hour   Intake                0 ml   Output              575 ml   Net             -575 ml         Nutrition        Orders Placed This Encounter   Procedures   • DIET NPO       Standing Status:   Standing       Number of Occurrences:   8       Order Specific Question:   Type:       Answer:   At Midnight [5]       Order Specific Question:   Restrict to:       Answer:   Sips with Medications [3]      Physical Exam   Constitutional: He is oriented to person, place, and time. He appears well-developed and well-nourished. Ill appearing  HENT:   Head: Normocephalic.   Mouth/Throat: No oropharyngeal exudate.   Eyes: Conjunctivae are normal. Pupils are equal, round, and reactive to light. Scleral icterus is present.   Neck: Normal range of motion. No tracheal deviation present.   Cardiovascular: Normal rate and regular rhythm.    No murmur heard.  Pulmonary/Chest: Effort normal. No respiratory distress. He has no wheezes. He exhibits no tenderness.   Abdominal: Soft. He exhibits distension. There is tenderness. There is guarding.   Musculoskeletal: Normal range of motion. He exhibits no edema or tenderness.   Lymphadenopathy:     He has no cervical adenopathy.   Neurological: He is alert and oriented to person, place, and time. No cranial nerve deficit.   Skin: Skin is warm and dry. No rash noted.   Jaundiced   Psychiatric: Anxious, tangential.  Pleasant  Nursing note and vitals reviewed.           Recent Labs      12/06/17   0136  12/07/17   0318   WBC  8.1  5.2   RBC  3.32*  3.03*   HEMOGLOBIN  10.5*  9.6*   HEMATOCRIT  29.1*  26.8*   MCV  87.7  88.4   MCH  31.6  31.7   MCHC  36.1*  35.8*   RDW  77.1*  79.1*   PLATELETCT  220  185   MPV  11.2  11.4           Recent Labs      12/06/17    0136  12/07/17   0214   SODIUM  133*  135   POTASSIUM  3.8  4.1   CHLORIDE  101  104   CO2  21  21   GLUCOSE  123*  91   BUN  11  14   CREATININE  0.98  0.93   CALCIUM  8.7  8.2*          Recent Labs      12/06/17   0136   INR  1.74*                  Assessment/Plan           * Obstructive jaundice- (present on admission)   Assessment & Plan     Secondary to obstructive pancreatic cancer, marked bili elevation >38, now down trending after bili drain placed.  IR placed drain 12/9  EUS done w biopsy taken on 12/7 - results pending          Pancreatic cancer (CMS-HCC)- (present on admission)   Assessment & Plan     Advanced, likely with metastasis to the liver as seen on CT imaging.  He does have a hx of HCC and has been treated/followed by [facility].  Scan in 6/2017 showed reactive peripancreatic lymph notes which could have suggested CA at that time.  Path still pending but tumor markers are elevated.  Patient at this point would like to answer questions re: chemotherapy options but is leaning towards comfort care  12/7: EUS, w celaic block and biopsy  12/9: IR for stent placement  Pain control with oxycodone and Dilaudid q3  Methadone 100  Palliative care is following, discussed case w them       Normocytic anemia   Assessment & Plan     No e/o blood loss, likely from malignancy  -Monitor       Hyponatremia   Assessment & Plan     Improved   Repeat in AM       Chronic respiratory failure with hypoxia (CMS-HCC)   Assessment & Plan     Due to COPD, wears 2L at baseline  -No acute complaints  -Continue home inhaler regimen       Hepatitis C- (present on admission)   Assessment & Plan     S/p treatment still w cirrhosis, followed by GI       COAGULOPATHY  Due to cirrhosis/obstructive jaundice.  FFP given prior to stent  -Monitor  -No e/o bleeding     COPD (chronic obstructive pulmonary disease) (CMS-HCC)- (present on admission)   Assessment & Plan     Currently in no acute exacerbation  Continue Spiriva  Breathing treatments  when necessary  RT protocol             Reviewed items::  EKG reviewed, Labs reviewed, Medications reviewed and Radiology images reviewed  Scales catheter::  No Scales

## 2017-12-10 NOTE — CARE PLAN
Problem: Bowel/Gastric:  Goal: Normal bowel function is maintained or improved  Pt educated on bowel care/protocol, side effects of pain meds, encouraged ambulating and drinking fluids.

## 2017-12-11 NOTE — CARE PLAN
Problem: Venous Thromboembolism (VTW)/Deep Vein Thrombosis (DVT) Prevention:  Goal: Patient will participate in Venous Thrombosis (VTE)/Deep Vein Thrombosis (DVT)Prevention Measures  Outcome: PROGRESSING AS EXPECTED  Pt on Lovenox for pharmacologic prophylaxis.    Problem: Respiratory:  Goal: Respiratory status will improve  Outcome: PROGRESSING AS EXPECTED  Pt SPO2 98% on 2L NC, has no complaints of shortness of breath or difficulty breathing.

## 2017-12-11 NOTE — PROGRESS NOTES
Interval History:  Mr. Barrios is a 67 year old man admitted for abdominal pain, currently being evaluated for a 5cm mass at the large head of the pancreas with with involvement/obliteration of ampulla.      12/07, EGD/ EUS were done and FNA and celiac neurolysis were completed. ERCP and stent placement were aborted as they were unable to find biliary orifice. Preliminary pathology (+) for malignancy (highly suspicious).     12/08 His pain was such that he had difficulty continuing our conversation. He did express that he was disappointed that the stent could not be placed and that he has decided he would like treatment.     12/9: PTC done     12/11 He continues to have pain at a level of difficulty speaking with us. The current dose of dilaudid does not seem to be controlling his pain. He is distressed and anxious, asking what it will feel like when he dies. He reports he believes that yesterday difficult IV access preventing him from receiving any Dilaudid and that his pain over the weekend escalated. His bilirubin has decreased to 21, and his jaundice appears to be improving. He denies nausea or vomiting and reports his itching has improved. He is not short of breath, but his pain is exacerbated on inspiration so he is breathing shallowly.      Review of Systems:  Positive for agitation. Negative for dyspnea. Positive for pain. Positive for anxiety. Negative for nausea and vomiting. Positive for constipation.         Physical Exam:  Physical Exam   Constitutional: He is oriented to person, place, and time. He appears distressed.   Eyes: Scleral icterus is present.   Cardiovascular: Normal rate, regular rhythm, normal heart sounds and intact distal pulses.    Pulmonary/Chest: Breath sounds normal.   Abdominal: He exhibits distension.   Biliary drain in place    Neurological: He is alert and oriented to person, place, and time.   Skin: Skin is warm and dry. Rash noted. He is not diaphoretic.   jaundice    Psychiatric:   Anxious          Lab Results:        Recent Labs      17   0454  12/10/17   0341  17   0152   SODIUM  138  140  134*   POTASSIUM  3.7  3.6  4.0   CHLORIDE  106  104  104   CO2  23  26  24   GLUCOSE  102*  90  119*   BUN  15  13  12   CREATININE  1.05  1.05  0.91   CALCIUM  8.7  8.9  8.5          Recent Labs      17   0504   WBC  6.8   RBC  2.68*   HEMOGLOBIN  8.9*   HEMATOCRIT  24.9*   MCV  92.9   MCH  33.2*   MCHC  35.7*   RDW  77.2*   PLATELETCT  232   MPV  12.0            Hemodynamics:  Temp (24hrs), Av.9 °C (98.4 °F), Min:36.3 °C (97.3 °F), Max:38.1 °C (100.5 °F)  Temperature: 36.3 °C (97.3 °F)  Pulse  Av.9  Min: 49  Max: 78  Blood Pressure : 116/71        Medications:  • bisacodyl  10 mg     • gabapentin  600 mg     • nicotine  21 mg     • enoxaparin  40 mg     • senna-docusate  2 Tab     • methadone  80 mg     • methadone  20 mg           Problem List:  1. Epigastric pain - likely caused by pancreatic mass  2. Obstructive Jaundice causing pruritis   3. Pancreatic mass -5 CM  4. COPD - previous tobacco use  5. Hyperbilirubinemia reduced to 21 from 38.  6. Hepatitis C  7. H/O IVDU -heroin  8. Methadone maintenance  9. PVD   10. Peripheral Neuropathy     Plan:     1. Abdominal Pain  Celliac plexus block was done , which should provide an overall improvement. His 9 year history of methadone use has given him a significant tolerance to opiate pain medication. We recommend increasing his dose of Dilaudid to 1.0mg - 2.0mg Q3 hrs because of this tolerance. Methadone is effective pain management in hepatic disease, and we recommend increasing it to 60mg BID.  Also increase oral Dilaudid to 8 mg q 3 h prn. Given increased risk of prolonged QTc, we will monitor Mg and K, give K-PHOS and repeat ECG .     2. Pancreatic Mass  FNA of the mass was done . PTC was done . Pathology is pending, and Oncology will discuss treatment options and approach.     I did very  directly address with the patient the complexities of managing his pain in the face of his previous addiction disorder and encouraged him to not over use pain medications. Ultimately the patient will need hospice care.    Discussed with Dr Morgan and bedside RN.         Code status discussed: DNR - confirmed today     Transition planning discussed:     Goals of care addressed: Uncertain. Somewhat dependent on pathology   Total visit time  35minutes with 50% of the time spent with counseling and case coordination.     Medications reviewed. Labs Reviewed.

## 2017-12-11 NOTE — PROGRESS NOTES
Assumed care of pt, received report from day shift RN, pt assessed.  Pt complaining of 9/10 mid abdominal pain, pt medicated per MAR.  Pt is A&O x4.  Pt low fall risk, wearing treaded socks, bed locked and in lowest position, bed alarm not indicated.  Pt instructed to call for assistance prior to getting OOB, pt verbalized understanding.  Call light, phone, and personal belongings within reach.

## 2017-12-11 NOTE — PROGRESS NOTES
Bedside report received. Assumed care of pt.  Pt able to make needs known.  Pt shows no s/s of distress.  Resting comfortably in bed.   Fall precautions in place, call light and belongings within reach.  Hourly rounding in place.

## 2017-12-11 NOTE — PROGRESS NOTES
Pt's IV leaking around site, unable to give IV pain meds. Attempted x3 iv sticks without assistive equipment, unable to place new iv. ICU RN placed 2 new IV's in pt with U/S, when RN gave medication, pt states painful and edema noted around sites after IV flushed. Pt states not feeling any relief with attempted dose of dilaudid. MD notified, additional dose of dilaudid ok to give IV when IV access established, RN attempted once more to flush IVs, unsuccessful, dose wasted and IV's discontinued, and night shift RN notified. Pt medicated with po oxycodone and po dilaudid with attempts to relieve some pain. Report given to night shift to continue pt monitoring.

## 2017-12-11 NOTE — PROGRESS NOTES
[facility] Hospitalist Progress Note          Chief Complaint  67M w hx of hep C s/p treatment ([facility]), chronic resp failure with hypoxia due to COPD, admitted with worsening abdominal pain found to have 5cm pancreatic mass w obstruction.       Interval Problem Update  : Plan for EUS at [facility], pain is improved w dilaudid and methadone.  Pending biopsy results prior to onc consult    : Unsuccessful stent under EUS, planned for IR placement today.  Biopsy taken, results pending.  INR elevated, 2U FFP given    : Stent placed in AM.  Bili still >39, marked jaundice.  Pain w relatively good control.  Becoming more confused/anxious    12/10: Bili improved after bili drain placed.  Constipated. Pending path    : Bili trending down, lost IV and had to get it replaced.     Consultants/Specialty  Dr. Alvarez - GI     Disposition  Biopsy path, consult onc when back  Bili in AM after stent         Review of Systems   Constitutional: Negative for chills, fever and +malaise/fatigue.   HENT: Negative for sore throat.    Respiratory: Negative for cough and shortness of breath.    Cardiovascular: Negative for chest pain and palpitations.   Gastrointestinal: Positive for abdominal pain, constipated. Negative for blood in stool, diarrhea, heartburn, nausea and vomiting.   Genitourinary: Negative for dysuria and frequency.   Musculoskeletal: Negative for back pain and myalgias.   Neurological: Negative for dizziness, focal weakness, weakness and headaches.   Psychiatric/Behavioral: Positive for anxious, forgetful. Occasionally confused.  Negative for depression and hallucinations.   All other systems reviewed and are negative.      Physical Exam   Laboratory/Imaging   Hemodynamics  Temp (24hrs), Av.8 °C (98.2 °F), Min:36.6 °C (97.8 °F), Max:36.9 °C (98.5 °F)   Temperature: 36.6 °C (97.8 °F)  Pulse  Av.9  Min: 50  Max: 80    Blood Pressure : 107/67       Respiratory      Respiration: 17, Pulse Oximetry: 98 %         RUL Breath Sounds: Diminished, RML Breath Sounds: Diminished, RLL Breath Sounds: Diminished, NARESH Breath Sounds: Diminished, LLL Breath Sounds: Diminished     Fluids     Intake/Output Summary (Last 24 hours) at 12/07/17 1304  Last data filed at 12/07/17 0800    Gross per 24 hour   Intake                0 ml   Output              575 ml   Net             -575 ml         Nutrition        Orders Placed This Encounter   Procedures   • DIET NPO       Standing Status:   Standing       Number of Occurrences:   8       Order Specific Question:   Type:       Answer:   At Midnight [5]       Order Specific Question:   Restrict to:       Answer:   Sips with Medications [3]      Physical Exam   Constitutional: He is oriented to person, place, and time. He appears well-developed and well-nourished. Ill appearing  HENT:   Head: Normocephalic.   Mouth/Throat: No oropharyngeal exudate.   Eyes: Conjunctivae are normal. Pupils are equal, round, and reactive to light. Scleral icterus is present.   Neck: Normal range of motion. No tracheal deviation present.   Cardiovascular: Normal rate and regular rhythm.    No murmur heard.  Pulmonary/Chest: Effort normal. No respiratory distress. He has no wheezes. He exhibits no tenderness.   Abdominal: Soft. He exhibits distension. There is tenderness. There is guarding.   Musculoskeletal: Normal range of motion. He exhibits no edema or tenderness.   Lymphadenopathy:     He has no cervical adenopathy.   Neurological: He is alert and oriented to person, place, and time. No cranial nerve deficit.   Skin: Skin is warm and dry. No rash noted.   Jaundiced   Psychiatric: Anxious, tangential.  Pleasant  Nursing note and vitals reviewed.           Recent Labs      12/06/17   0136  12/07/17   0318   WBC  8.1  5.2   RBC  3.32*  3.03*   HEMOGLOBIN  10.5*  9.6*   HEMATOCRIT  29.1*  26.8*   MCV  87.7  88.4   MCH  31.6  31.7   MCHC  36.1*  35.8*   RDW  77.1*  79.1*   PLATELETCT  220  185   MPV  11.2  11.4            Recent Labs      12/06/17   0136  12/07/17   0214   SODIUM  133*  135   POTASSIUM  3.8  4.1   CHLORIDE  101  104   CO2  21  21   GLUCOSE  123*  91   BUN  11  14   CREATININE  0.98  0.93   CALCIUM  8.7  8.2*          Recent Labs      12/06/17   0136   INR  1.74*                  Assessment/Plan           * Obstructive jaundice- (present on admission)   Assessment & Plan     Secondary to obstructive pancreatic cancer, marked bili elevation >38, now down trending after bili drain placed.  IR placed drain 12/9  EUS done w biopsy taken on 12/7 - results pending          Pancreatic cancer (CMS-HCC)- (present on admission)   Assessment & Plan     Advanced, likely with metastasis to the liver as seen on CT imaging.  He does have a hx of HCC and has been treated/followed by [facility].  Scan in 6/2017 showed reactive peripancreatic lymph notes which could have suggested CA at that time.  Path still pending but tumor markers are elevated.  Patient at this point would like to answer questions re: chemotherapy options but is leaning towards comfort care  12/7: EUS, w celaic block and biopsy  12/9: IR for drain placement  Pain control with oxycodone and Dilaudid 8mg po q3  Methadone, increase to 60mg BID  IV dilaudid for breakthrough if needed.  Palliative care is following, discussed case w them       Normocytic anemia   Assessment & Plan     No e/o blood loss, likely from malignancy  -Monitor       Hyponatremia   Assessment & Plan     Improved   Repeat in AM       Chronic respiratory failure with hypoxia (CMS-HCC)   Assessment & Plan     Due to COPD, wears 2L at baseline  -No acute complaints  -Continue home inhaler regimen  -Resume spiriva       Hepatitis C- (present on admission)   Assessment & Plan     S/p treatment still w cirrhosis, followed by GI       COAGULOPATHY  Due to cirrhosis/obstructive jaundice.  FFP given prior to stent  -Monitor  -No e/o bleeding     COPD (chronic obstructive pulmonary disease) (CMS-HCC)-  (present on admission)   Assessment & Plan     Currently in no acute exacerbation  Continue Spiriva  Breathing treatments when necessary  RT protocol             Reviewed items::  EKG reviewed, Labs reviewed, Medications reviewed and Radiology images reviewed  Scales catheter::  No Scales

## 2017-12-11 NOTE — CARE PLAN
Problem: Discharge Barriers/Planning  Goal: Patient's continuum of care needs will be met    Intervention: Involve patient and significant other/support system in setting and prioritizing goals for hospital stay and discharge  Path on tissue sample still pending.  Once that is received, a potential POC can be discussed with pt.        Problem: Pain Management  Goal: Pain level will decrease to patient's comfort goal  Outcome: PROGRESSING SLOWER THAN EXPECTED  Pt currently utilizing dilaudid 1-2mg as per mar Q3 for pain. Discussed with pt use of oral dilaudid as dose was increased from 4-8mg PO. Pt was receptive to conversation, but somewhat dismissive as to whether he thought it would work.  Will continue to educate and advocate for PO med use as needed.     Problem: Psychosocial Needs:  Goal: Level of anxiety will decrease  Outcome: NOT MET

## 2017-12-11 NOTE — DISCHARGE PLANNING
Updated Clinical note: via chart review  Total Bilirubin trending down to 21.8 today  Alkaline Phosphatase trending down to 205  Path result pending    O2 at 2liter per NC-baseline, home O2 provided by [facility]  Pain Management- IV and PO pain medication  Bilirubin Drain=295cc /24 hrs.    Update Discharge Planning:  Palliative care is involved.   Pt lives alone but has a friend that can help at home and can provide transportation when d/c if needed.   Possible need for hospice care when dc. Palliative care will need to follow pt.  May need IP placement with hospice if friend is unable to provide 24 hr care for pt when d/c. Pt has Medicare and Medicaid which can be payor for IP care.     Addendum:  Discussed by IDT, pending path then hospice discussion possible.

## 2017-12-11 NOTE — CARE PLAN
Savannah Torres Fall Risk Assessment:     Last Known Fall: No falls  Mobility: Dizziness/generalized weakness  Medications: Cardiovascular or central nervous system meds  Mental Status/LOC/Awareness: Awake, alert, and oriented to date, place, and person  Toileting Needs: Use of assistive device (Bedside commode, bedpan, urinal)  Volume/Electrolyte Status: No problems  Communication/Sensory: Visual (Glasses)/hearing deficit  Behavior: Depression/anxiety  Savannah Torres Fall Risk Total: 9  Fall Risk Level: LOW RISK    Universal Fall Precautions:  call light/belongings in reach, bed in low position and locked, wheelchairs and assistive devices out of sight, siderails up x 2, use non-slip footwear, clutter free and spill free environment, adequate lighting, educate on level of risk, educate to call for assistance    Fall Risk Level Interventions:   TRIAL (TELE 8, NEURO, MED HAROON 5) Low Fall Risk Interventions  Place yellow fall risk ID band on patient: verified  Provide patient/family education based on risk assessment: completed  Educate patient/family to call staff for assistance when getting out of bed: completed  Place fall precaution signage outside patient door: verified      Patient Specific Interventions:     Medication: review medications with patient and family, assess for medications that can be discontinued or dosage decreased and limit combination of prn medications  Mental Status/LOC/Awareness: reinforce falls education, check on patient hourly, reinforce the use of call light and provide activity  Toileting: provide frquent toileting and monitor intake and output/use of appropriate interventions  Volume/Electrolyte Status: advance diet as tolerated and monitor abnormal lab values  Communication/Sensory: update plan of care on whiteboard and ensure proper positioning when transferrng/ambulating  Behavioral: engage patient in daily activities, administer medication as ordered and instruct/reinforce fall  program rationale  Mobility: dangle prior to standing, ensure bed is locked and in lowest position and instruct patient to exit bed on their strongest side

## 2017-12-11 NOTE — NON-PROVIDER
Palliative Progress Note               Author: France Arredondo Date & Time created: 12/11/2017  9:42 AM     Interval History:  Mr. Barrios is a 67 year old man admitted for abdominal pain, currently being evaluated for a 5cm mass at the large head of the pancreas with with involvement/obliteration of ampulla.     12/07, EGD/ EUS were done and FNA and celiac neurolysis were completed. ERCP and stent placement were aborted as they were unable to find biliary orifice. Preliminary pathology (+) for malignancy (highly suspicious).    12/08 His pain was such that he had difficulty continuing our conversation. He did express that he was disappointed that the stent could not be placed and that he has decided he would like treatment.    12/9: PTC done    12/11 He continues to have pain at a level of difficulty speaking with us. The current dose of dilaudid does not seem to be controlling his pain. He is distressed and anxious, asking what it will feel like when he dies. He reports he believes that yesterday difficult IV access preventing him from receiving any Dilaudid and that his pain over the weekend escalated. His bilirubin has decreased to 21, and his jaundice appears to be improving. He denies nausea or vomiting and reports his itching has improved. He is not short of breath, but his pain is exacerbated on inspiration so he is breathing shallowly.     Review of Systems:  Positive for agitation. Negative for dyspnea. Positive for pain. Positive for anxiety. Negative for nausea and vomiting. Positive for constipation.        Physical Exam:  Physical Exam   Constitutional: He is oriented to person, place, and time. He appears distressed.   Eyes: Scleral icterus is present.   Cardiovascular: Normal rate, regular rhythm, normal heart sounds and intact distal pulses.    Pulmonary/Chest: Breath sounds normal.   Abdominal: He exhibits distension.   Biliary drain in place    Neurological: He is alert and oriented to person,  place, and time.   Skin: Skin is warm and dry. Rash noted. He is not diaphoretic.   jaundice   Psychiatric:   Anxious        Lab Results:  Recent Labs      17   0454  12/10/17   0341  17   0152   SODIUM  138  140  134*   POTASSIUM  3.7  3.6  4.0   CHLORIDE  106  104  104   CO2  23  26  24   GLUCOSE  102*  90  119*   BUN  15  13  12   CREATININE  1.05  1.05  0.91   CALCIUM  8.7  8.9  8.5     Recent Labs      17   0504   WBC  6.8   RBC  2.68*   HEMOGLOBIN  8.9*   HEMATOCRIT  24.9*   MCV  92.9   MCH  33.2*   MCHC  35.7*   RDW  77.2*   PLATELETCT  232   MPV  12.0         Hemodynamics:  Temp (24hrs), Av.9 °C (98.4 °F), Min:36.3 °C (97.3 °F), Max:38.1 °C (100.5 °F)  Temperature: 36.3 °C (97.3 °F)  Pulse  Av.9  Min: 49  Max: 78  Blood Pressure : 116/71       Medications:  • bisacodyl  10 mg     • gabapentin  600 mg     • nicotine  21 mg     • enoxaparin  40 mg     • senna-docusate  2 Tab     • methadone  80 mg     • methadone  20 mg         Problem List:  1. Epigastric pain - likely caused by pancreatic mass  2. Obstructive Jaundice causing pruritis   3. Pancreatic mass -5 CM  4. COPD - previous tobacco use  5. Hyperbilirubinemia  6. Hepatitis C  7. H/O IVDU -heroin  8. Methadone maintenance  9. PVD   10. Peripheral Neuropathy    Plan:     1. Abdominal Pain  Celliac plexus block was done , which should provide an overall improvement. His 9 year history of methadone use has given him a significant tolerance to opiate pain medication. We recommend increasing his dose of Dilaudid to 1.0mg - 2.0mg Q3 hrs because of this tolerance. Methadone is effective pain management in hepatic disease, and we recommend increasing it to 80mg BID. Given increased risk of prolonged QTc, we will monitor Mg and K, give K-PHOS and repeat ECG .    2. Pancreatic Mass  FNA of the mass was done . PTC was done . Pathology is pending, and Oncology will discuss treatment options and approach.           Code  status discussed:    Transition planning discussed:    Goals of care addressed:    Total visit time  minutes with 50% of the time spent with counseling and case coordination.    Medications reviewed. Labs Reviewed.

## 2017-12-11 NOTE — PROGRESS NOTES
Savannah Torres Fall Risk Assessment:     Last Known Fall: No falls  Mobility: Dizziness/generalized weakness  Medications: Cardiovascular or central nervous system meds  Mental Status/LOC/Awareness: Awake, alert, and oriented to date, place, and person  Toileting Needs: Use of assistive device (Bedside commode, bedpan, urinal)  Volume/Electrolyte Status: No problems  Communication/Sensory: Visual (Glasses)/hearing deficit  Behavior: Depression/anxiety  Savannah Torres Fall Risk Total: 9  Fall Risk Level: LOW RISK    Universal Fall Precautions:  call light/belongings in reach, bed in low position and locked, wheelchairs and assistive devices out of sight, siderails up x 2, use non-slip footwear, adequate lighting, clutter free and spill free environment, educate on level of risk, educate to call for assistance    Fall Risk Level Interventions:   TRIAL (TELE 8, NEURO, MED HAROON 5) Low Fall Risk Interventions  Place yellow fall risk ID band on patient: verified  Provide patient/family education based on risk assessment: completed  Educate patient/family to call staff for assistance when getting out of bed: completed  Place fall precaution signage outside patient door: verified      Patient Specific Interventions:     Medication: review medications with patient and family, assess for medications that can be discontinued or dosage decreased and limit combination of prn medications  Mental Status/LOC/Awareness: reorient patient, reinforce falls education, check on patient hourly and reinforce the use of call light  Toileting: provide frquent toileting, instruct patient/family on the need to call for assistance when toileting and do not leave patient unattended in bathroom/refer to toileting scripting  Volume/Electrolyte Status: ensure patient remains hydrated and monitor abnormal lab values  Communication/Sensory: update plan of care on whiteboard and ensure patient has glasses/contacts and hearing aids/dentures  Behavioral:  encourage patient to voice feelings, administer medication as ordered and instruct/reinforce fall program rationale  Mobility: ensure bed is locked and in lowest position and provide appropriate assistive device

## 2017-12-12 NOTE — DISCHARGE PLANNING
Updated Clinical note: via chart review  Path still pending    Update Discharge Planning:  dispo depends on path results. Palliative care is on board.    Addendum:  Discussed with IDT :  Path back per MD and he confirms that it mets adenocarcinoma  Onc consult pending   dispo would be hospice vs aggressive treatment which will be chemo.    Addendum:  Per Malvin, pt has chosen hospice care.

## 2017-12-12 NOTE — CARE PLAN
Problem: Safety  Goal: Will remain free from falls  Outcome: PROGRESSING AS EXPECTED  Bed in lowest position and call light within reach.    Problem: Pain Management  Goal: Pain level will decrease to patient's comfort goal  Medicated with prn pain medication.

## 2017-12-12 NOTE — DIETARY
Nutrition Update:  Patient eating breakfast (at lunchtime) that had been warmed up.  Patient reported pain with PO intake but is doing the best he can.  Reviewed meal and snack preferences with patient.  He was agreeable to having snacks sent between meals.  Nutrition Representative will continue to see him for ongoing meal and snack preferences.  RD following per department policy.

## 2017-12-12 NOTE — PROGRESS NOTES
[facility]ist Progress Note          Chief Complaint  67M w hx of hep C s/p treatment ([facility]), chronic resp failure with hypoxia due to COPD, admitted with worsening abdominal pain found to have 5cm pancreatic mass w obstruction.       Interval Problem Update  : Plan for EUS at [facility], pain is improved w dilaudid and methadone.  Pending biopsy results prior to onc consult    : Unsuccessful stent under EUS, planned for IR placement today.  Biopsy taken, results pending.  INR elevated, 2U FFP given    : Stent placed in AM.  Bili still >39, marked jaundice.  Pain w relatively good control.  Becoming more confused/anxious    12/10: Bili improved after bili drain placed.  Constipated. Pending path    : Bili trending down, lost IV and had to get it replaced.    : Continues to have quite a bit of pain. Labs are slowly improving. Very anxious. No acute events overnight. Biliary drain remains in place     Consultants/Specialty  Dr. Alvarez - GI     Disposition  Biopsy pathology shows metastatic adenocarcinoma  We'll consult oncology today           Review of Systems   Constitutional: Negative for chills, fever and +malaise/fatigue.   HENT: Negative for sore throat.    Respiratory: Negative for cough and shortness of breath.    Cardiovascular: Negative for chest pain and palpitations.   Gastrointestinal: Positive for abdominal pain, constipated. Negative for blood in stool, diarrhea, heartburn, nausea and vomiting.   Genitourinary: Negative for dysuria and frequency.   Musculoskeletal: Negative for back pain and myalgias.   Neurological: Negative for dizziness, focal weakness, weakness and headaches.   Psychiatric/Behavioral: Positive for anxious, forgetful. Occasionally confused.  Negative for depression and hallucinations.   All other systems reviewed and are negative.      Physical Exam   Laboratory/Imaging   Hemodynamics  Temp (24hrs), Av.8 °C (98.2 °F), Min:36.6 °C (97.8 °F), Max:36.9 °C (98.5  °F)   Temperature: 36.6 °C (97.8 °F)  Pulse  Av.9  Min: 50  Max: 80    Blood Pressure : 107/67       Respiratory      Respiration: 17, Pulse Oximetry: 98 %        RUL Breath Sounds: Diminished, RML Breath Sounds: Diminished, RLL Breath Sounds: Diminished, NARESH Breath Sounds: Diminished, LLL Breath Sounds: Diminished     Fluids     Intake/Output Summary (Last 24 hours) at 17 1304  Last data filed at 17 0800    Gross per 24 hour   Intake                0 ml   Output              575 ml   Net             -575 ml         Nutrition        Orders Placed This Encounter   Procedures   • DIET NPO       Standing Status:   Standing       Number of Occurrences:   8       Order Specific Question:   Type:       Answer:   At Midnight [5]       Order Specific Question:   Restrict to:       Answer:   Sips with Medications [3]      Physical Exam   Constitutional: He is oriented to person, place, and time. He appears well-developed and well-nourished. Ill appearing. Moderate distress secondary to pain  HENT:   Head: Normocephalic.   Mouth/Throat: No oropharyngeal exudate.   Eyes: Conjunctivae are normal. Pupils are equal, round, and reactive to light. Scleral icterus is present.   Neck: Normal range of motion. No tracheal deviation present.   Cardiovascular: Normal rate and regular rhythm.    No murmur heard.  Pulmonary/Chest: Effort normal. No respiratory distress. He has no wheezes. He exhibits no tenderness.   Abdominal: Soft. He exhibits distension. There is tenderness. There is guarding.   Musculoskeletal: Normal range of motion. He exhibits no edema or tenderness.   Lymphadenopathy:     He has no cervical adenopathy.   Neurological: He is alert and oriented to person, place, and time. No cranial nerve deficit.   Skin: Skin is warm and dry. No rash noted.   Jaundiced   Psychiatric: Anxious, tangential at times.  Pleasant  Nursing note and vitals reviewed.           Recent Labs      17   0136  17   0316    WBC  8.1  5.2   RBC  3.32*  3.03*   HEMOGLOBIN  10.5*  9.6*   HEMATOCRIT  29.1*  26.8*   MCV  87.7  88.4   MCH  31.6  31.7   MCHC  36.1*  35.8*   RDW  77.1*  79.1*   PLATELETCT  220  185   MPV  11.2  11.4           Recent Labs      12/06/17   0136  12/07/17   0214   SODIUM  133*  135   POTASSIUM  3.8  4.1   CHLORIDE  101  104   CO2  21  21   GLUCOSE  123*  91   BUN  11  14   CREATININE  0.98  0.93   CALCIUM  8.7  8.2*          Recent Labs      12/06/17   0136   INR  1.74*                  Assessment/Plan           * Obstructive jaundice- (present on admission)   Assessment & Plan     Secondary to obstructive pancreatic cancer, marked bili elevation >38,Continues to trend down after bili drain placed.  IR placed drain 12/9  EUS done w biopsy taken on 12/7 - resultsShow metastatic adenocarcinoma in the head of the pancreas          Pancreatic cancer (CMS-HCC)- (present on admission)   Assessment & Plan     Advanced, likely with metastasis to the liver as seen on CT imaging.  He does have a hx of HCC and has been treated/followed by [facility].  Scan in 6/2017 showed reactive peripancreatic lymph notes which could have suggested CA at that time.  Path still pending but tumor markers are elevated.  Patient at this point would like to answer questions re: chemotherapy options but is leaning towards comfort care  12/7: EUS, w celaic block and biopsy  12/9: IR for drain placement  Pain control with oxycodone and Dilaudid 8mg po q3  Methadone,Continue 60mg BID  IV dilaudid for breakthrough if needed.  Palliative care is following, discussed case w them       Normocytic anemia   Assessment & Plan     No e/o blood loss, likely from malignancy  -Monitor       Hyponatremia   Assessment & Plan     Improved        Chronic respiratory failure with hypoxia (CMS-HCC)   Assessment & Plan     Due to COPD, wears 2L at baseline  -No acute complaints  -Continue home inhaler regimen  -Resume spiriva       Hepatitis C- (present on admission)    Assessment & Plan     S/p treatment still w cirrhosis, followed by GI       COAGULOPATHY  Due to cirrhosis/obstructive jaundice.  FFP given prior to stent  -Monitor  -No e/o bleeding     COPD (chronic obstructive pulmonary disease) (CMS-HCC)- (present on admission)   Assessment & Plan     Currently in no acute exacerbation  Continue Spiriva  Breathing treatments when necessary  RT protocol             Reviewed items::  EKG reviewed, Labs reviewed, Medications reviewed and Radiology images reviewed  Scales catheter::  No Scales

## 2017-12-12 NOTE — PROGRESS NOTES
9:32 AM           Bedside report received. Assumed care of pt.  Pt able to make needs known.  Pt shows s/s of distress, pain not well controlled, hospitalist and palliative aware.  Pt irritated and restless.  Roommate is a source of irritation, and vice versa.  Patho results back --->adenocarcinoma.  Onc consult pending.   Resting comfortably in bed.   Fall precautions in place, call light and belongings within reach.  Hourly rounding in place.

## 2017-12-12 NOTE — PROGRESS NOTES
Patient alert and oriented x 3. Drain intact to RUQ, draining brownish, greenish drainage. Patient voids in urinal, brown urine. Edema noted to bilateral lower extremity. Bed in lowest position and call light within reach.

## 2017-12-12 NOTE — PROGRESS NOTES
Bedside report received. Assumed care of pt.  Pt able to make needs known.  Pt shows s/s of distress, pain not well controlled, hospitalist and palliative aware.  Pt irritated and restless.  Roommate is a source of irritation, and vice versa.  Patho results back --->adenocarcinoma.  Onc consult pending.   Resting comfortably in bed.   Fall precautions in place, call light and belongings within reach.  Hourly rounding in place.

## 2017-12-12 NOTE — PROGRESS NOTES
Savannah Torres Fall Risk Assessment:     Last Known Fall: No falls  Mobility: Dizziness/generalized weakness  Medications: Cardiovascular or central nervous system meds  Mental Status/LOC/Awareness: Awake, alert, and oriented to date, place, and person  Toileting Needs: Use of assistive device (Bedside commode, bedpan, urinal)  Volume/Electrolyte Status: No problems  Communication/Sensory: Visual (Glasses)/hearing deficit  Behavior: Depression/anxiety  Savannah Torres Fall Risk Total: 9  Fall Risk Level: LOW RISK    Universal Fall Precautions:  call light/belongings in reach, bed in low position and locked, wheelchairs and assistive devices out of sight, siderails up x 2, use non-slip footwear, adequate lighting, clutter free and spill free environment, educate on level of risk, educate to call for assistance    Fall Risk Level Interventions:   TRIAL (TELE 8, NEURO, MED HAROON 5) Low Fall Risk Interventions  Place yellow fall risk ID band on patient: verified  Provide patient/family education based on risk assessment: completed  Educate patient/family to call staff for assistance when getting out of bed: completed  Place fall precaution signage outside patient door: verified      Patient Specific Interventions:     Medication: review medications with patient and family, assess for medications that can be discontinued or dosage decreased and limit combination of prn medications  Mental Status/LOC/Awareness: check on patient hourly  Toileting: monitor intake and output/use of appropriate interventions  Volume/Electrolyte Status: monitor abnormal lab values  Communication/Sensory: update plan of care on whiteboard  Behavioral: engage patient in daily activities  Mobility: ensure bed is locked and in lowest position

## 2017-12-12 NOTE — CARE PLAN
Problem: Pain Management  Goal: Pain level will decrease to patient's comfort goal  Outcome: PROGRESSING SLOWER THAN EXPECTED  Pain discussed with Palliative team.  Dr. Joyner modified methadone.  Encouraged pt to take PO dilaudid.      Problem: Psychosocial Needs:  Goal: Level of anxiety will decrease  Outcome: PROGRESSING SLOWER THAN EXPECTED  Pt anxiety is increasing.  Asking for clonipin.  Will discuss with MD/palliative team.

## 2017-12-12 NOTE — PALLIATIVE CARE
"Palliative Care follow-up  PC RN visited with Tj at bedside and discussed his latest medical updates. He had just spoken with his friend Rebecca about his situation as well. He stated \"Well, kiddo, I got the news. And I'm okay with that. I'm really at peace with it.\" PC spent time with Tj as he reflected on memorable moments in his life and the peace he found with helping his parents at their end of lives as well. He recalls his love of motorcycle racing, watching sports, and surfing when he was younger. He states, \"No one wants to die, but I understand it's my time. I just don't want to suffer.\" PC RN validated his fears and wishes, noting the goal of comfort care/hospice is to have a peaceful, painless passing. He stated \"My only fear is of going down screaming.\" PC RN assured him this would not happen and explained the gradual process of end of life. This RN explained comfort measures and hospice again at length and he was agreeable to both at this time. Choice was completed selecting [facility] as he has expressed trust in Dr. Joyner for managing his pain and comfort. PC validated his wishes. He was very appreciative of the support, conversation and visit. Choice form faxed to [facility] [phone]    Call placed to his friend Rebecca at his request to provide updates. All questions answered and support provided.     Updated: MD/RN/ILYA    Plan: comfort care/hospice    Recommendations: I recommend a hospice consult.    Thank you for allowing Palliative Care to participate in this patient's care. Please feel free to call [phone] with any questions or concerns.  "

## 2017-12-12 NOTE — PROGRESS NOTES
Gabriela in palliative to assist with end of life planning for patient.   Onc has consulted and has discussed case with MD Yates.

## 2017-12-12 NOTE — PROGRESS NOTES
"Interval History:  Mr. Barrios is a 67 year old man admitted for abdominal pain, currently being evaluated for a 5cm mass at the large head of the pancreas with with involvement/obliteration of ampulla.      12/07, EGD/ EUS were done and FNA and celiac neurolysis were completed. ERCP and stent placement were aborted as they were unable to find biliary orifice. Preliminary pathology (+) for malignancy (highly suspicious).     12/08 His pain was such that he had difficulty continuing our conversation. He did express that he was disappointed that the stent could not be placed and that he has decided he would like treatment.     12/9: PTC done     12/11 He continues to have pain at a level of difficulty speaking with us. The current dose of dilaudid does not seem to be controlling his pain. He is distressed and anxious, asking what it will feel like when he dies. He reports he believes that yesterday difficult IV access preventing him from receiving any Dilaudid and that his pain over the weekend escalated. His bilirubin has decreased to 21, and his jaundice appears to be improving. He denies nausea or vomiting and reports his itching has improved. He is not short of breath, but his pain is exacerbated on inspiration so he is breathing shallowly.      12/12 He is anxious and agitated, c/o of increased \"insane\" pain in the upper right quadrant. He is clock watching for the IV Dilaudid and states he believes he is not getting the right dose or a placebo. He has been refusing the oral 8 mg dilaudid dose. The PM 60 mg Methadone was held 12/11, but it was begun this morning.   Potassium is WNL and  Bilirubin is on a downward trend at 20. His jaundice continues to improve, and he no longer c/o itching. He denies N/V. He has had a small bowel movement, but feels constipated.      Review of Systems:  Positive for agitation. Positive for pain. Negative for sedation. Positive for anxiety. Negative for nausea and vomiting. " Positive for constipation.         Physical Exam:  Physical Exam   Constitutional: He is oriented to person, place, and time. He appears distressed.   Abdominal: He exhibits distension. Bowel sounds are decreased. There is tenderness. There is no rebound.   Neurological: He is alert and oriented to person, place, and time.   Skin: Skin is warm and dry. Rash noted. He is not diaphoretic.   Psychiatric: His mood appears anxious. His speech is tangential.             Lab Results:        Recent Labs      12/10/17   0341  17   0152  17   0438   SODIUM  140  134*  138   POTASSIUM  3.6  4.0  4.3   CHLORIDE  104  104  105   CO2  26  24  23   GLUCOSE  90  119*  144*   BUN  13  12  15   CREATININE  1.05  0.91  0.86   CALCIUM  8.9  8.5  8.5          Recent Labs      17   0504   WBC  6.8   RBC  2.68*   HEMOGLOBIN  8.9*   HEMATOCRIT  24.9*   MCV  92.9   MCH  33.2*   MCHC  35.7*   RDW  77.2*   PLATELETCT  232   MPV  12.0            Hemodynamics:  Temp (24hrs), Av.5 °C (99.5 °F), Min:36.8 °C (98.2 °F), Max:37.9 °C (100.2 °F)  Temperature: 36.8 °C (98.2 °F)  Pulse  Av.7  Min: 49  Max: 85  Blood Pressure : 100/65        Medications:  • phosphorus  1 Tab     • methadone  60 mg     • tiotropium  1 Cap     • bisacodyl  10 mg     • gabapentin  600 mg     • nicotine  21 mg     • enoxaparin  40 mg     • senna-docusate  2 Tab           Problem List:  1. Epigastric pain - likely caused by pancreatic mass  2. Obstructive Jaundice causing pruritis   3. Pancreatic mass -5 CM  4. COPD - previous tobacco use  5. Hyperbilirubinemia reduced to 21 from 38.  6. Hepatitis C  7. H/O IVDU -heroin  8. Methadone maintenance  9. PVD   10. Peripheral Neuropathy     Plan:  1. Abdominal Pain   Dilaudid increased to 2.0mg Q3 hrs and Methadone increased to 60mg BID.      The complexities of managing his pain in the face of his previous addiction disorder have been discussed with him, and we encouraged him to not over use pain  "medications. After discussion, he will take the oral Dilaudid 8 mg q 3 h prn to improved pain control and was encouraged to ask for it.     Given increased risk of prolonged QTc with Methadone, we will continue to monitor Mg and K, give K-PHOS and repeat ECG 12/17.     2. Pancreatic Mass  PTC was done 12/09.  Bx on 12/07 of mass at the head of the pancreas showed adenocarcinoma.  He has stated he is decided that he would like treatment. Oncology will discuss treatment options and approach.   He has stated he wishes his friend Ana Cardona to be his DPOA but has not yet filled out an AD. He is irritable today and doesn't want to discuss that further but approves us to speak to her when necessary.    3. Addiction D/O - I was very direct with him about his evident addictive behavior and he became less agitated and admitted \"the beast is back\" and expressed his grief that after 15 years of abstinence he is in this situation. Support offered by active listening and validation of his thoughts and feelings. Bedside RN also at bedside and will continue to support use of PO Dilaudid.     Code status discussed: DNR     Transition planning discussed:      Goals of care addressed:     Total visit time 25 minutes with 50% of the time spent with counseling and case coordination.     Medications reviewed. Labs Reviewed.        "

## 2017-12-12 NOTE — NON-PROVIDER
"Palliative Progress Note               Author: France Arredondo Date & Time created: 12/12/2017  9:58 AM     Interval History:  Mr. Barrios is a 67 year old man admitted for abdominal pain, currently being evaluated for a 5cm mass at the large head of the pancreas with with involvement/obliteration of ampulla.      12/07, EGD/ EUS were done and FNA and celiac neurolysis were completed. ERCP and stent placement were aborted as they were unable to find biliary orifice. Preliminary pathology (+) for malignancy (highly suspicious).     12/08 His pain was such that he had difficulty continuing our conversation. He did express that he was disappointed that the stent could not be placed and that he has decided he would like treatment.     12/9: PTC done     12/11 He continues to have pain at a level of difficulty speaking with us. The current dose of dilaudid does not seem to be controlling his pain. He is distressed and anxious, asking what it will feel like when he dies. He reports he believes that yesterday difficult IV access preventing him from receiving any Dilaudid and that his pain over the weekend escalated. His bilirubin has decreased to 21, and his jaundice appears to be improving. He denies nausea or vomiting and reports his itching has improved. He is not short of breath, but his pain is exacerbated on inspiration so he is breathing shallowly.     12/12 He is anxious and agitated, c/o of increased \"insane\" pain in the upper right quadrant. He is clock watching for the IV Dilaudid and states he believes he is not getting the right dose or a placebo. He has been refusing the oral 8 mg dilaudid dose. The PM 60 mg Methadone was held 12/11, but it was begun this morning.   Potassium is WNL and  Bilirubin is on a downward trend at 20. His jaundice continues to improve, and he no longer c/o itching. He denies N/V. He has had a small bowel movement, but feels constipated.     Review of Systems:  Positive for " agitation. Positive for pain. Negative for sedation. Positive for anxiety. Negative for nausea and vomiting. Positive for constipation.        Physical Exam:  Physical Exam   Constitutional: He is oriented to person, place, and time. He appears distressed.   Abdominal: He exhibits distension. Bowel sounds are decreased. There is tenderness. There is no rebound.   Neurological: He is alert and oriented to person, place, and time.   Skin: Skin is warm and dry. Rash noted. He is not diaphoretic.   Psychiatric: His mood appears anxious. His speech is tangential.           Lab Results:  Recent Labs      12/10/17   0341  17   0152  17   0438   SODIUM  140  134*  138   POTASSIUM  3.6  4.0  4.3   CHLORIDE  104  104  105   CO2  26  24  23   GLUCOSE  90  119*  144*   BUN  13  12  15   CREATININE  1.05  0.91  0.86   CALCIUM  8.9  8.5  8.5     Recent Labs      17   0504   WBC  6.8   RBC  2.68*   HEMOGLOBIN  8.9*   HEMATOCRIT  24.9*   MCV  92.9   MCH  33.2*   MCHC  35.7*   RDW  77.2*   PLATELETCT  232   MPV  12.0         Hemodynamics:  Temp (24hrs), Av.5 °C (99.5 °F), Min:36.8 °C (98.2 °F), Max:37.9 °C (100.2 °F)  Temperature: 36.8 °C (98.2 °F)  Pulse  Av.7  Min: 49  Max: 85  Blood Pressure : 100/65       Medications:  • phosphorus  1 Tab     • methadone  60 mg     • tiotropium  1 Cap     • bisacodyl  10 mg     • gabapentin  600 mg     • nicotine  21 mg     • enoxaparin  40 mg     • senna-docusate  2 Tab         Problem List:  1. Epigastric pain - likely caused by pancreatic mass  2. Obstructive Jaundice causing pruritis   3. Pancreatic mass -5 CM  4. COPD - previous tobacco use  5. Hyperbilirubinemia reduced to 21 from 38.  6. Hepatitis C  7. H/O IVDU -heroin  8. Methadone maintenance  9. PVD   10. Peripheral Neuropathy    Plan:  1. Abdominal Pain   Dilaudid increased to 2.0mg Q3 hrs and Methadone increased to 60mg BID.     The complexities of managing his pain in the face of his previous  addiction disorder have been discussed with him, and we encouraged him to not over use pain medications. After discussion, he will take the oral Dilaudid 8 mg q 3 h prn to improved pain control and was encouraged to ask for it.    Given increased risk of prolonged QTc with Methadone, we will continue to monitor Mg and K, give K-PHOS and repeat ECG 12/17.     2. Pancreatic Mass  PTC was done 12/09.  Bx on 12/07 of mass at the head of the pancreas showed adenocarcinoma.  He has stated he is decided that he would like treatment. Oncology will discuss treatment options and approach.   We would like to speak more with his friend, Ana, present to assess his wishes and goals of care.     Code status discussed: DNR    Transition planning discussed:     Goals of care addressed:    Total visit time 25 minutes with 50% of the time spent with counseling and case coordination.    Medications reviewed. Labs Reviewed.

## 2017-12-13 NOTE — NON-PROVIDER
"Palliative Progress Note               Author: France Arredondo Date & Time created: 12/13/2017  2:52 PM     Interval History:  Mr. Barrios is a 67 year old man admitted for abdominal pain, currently being evaluated for a 5cm mass at the large head of the pancreas with with involvement/obliteration of ampulla.      12/07, EGD/ EUS were done and FNA and celiac neurolysis were completed. ERCP and stent placement were aborted as they were unable to find biliary orifice. Preliminary pathology (+) for malignancy (highly suspicious).     12/08 His pain was such that he had difficulty continuing our conversation. He did express that he was disappointed that the stent could not be placed and that he has decided he would like treatment.     12/9: PTC done     12/11 He continues to have pain at a level of difficulty speaking with us. The current dose of dilaudid does not seem to be controlling his pain. He is distressed and anxious, asking what it will feel like when he dies. He reports he believes that yesterday difficult IV access preventing him from receiving any Dilaudid and that his pain over the weekend escalated. His bilirubin has decreased to 21, and his jaundice appears to be improving. He denies nausea or vomiting and reports his itching has improved. He is not short of breath, but his pain is exacerbated on inspiration so he is breathing shallowly.      12/12 He is anxious and agitated, c/o of increased \"insane\" pain in the upper right quadrant. He is clock watching for the IV Dilaudid and states he believes he is not getting the right dose or a placebo. He has been refusing the oral 8 mg dilaudid dose. The PM 60 mg Methadone was held 12/11, but it was begun this morning.   Potassium is WNL and  Bilirubin is on a downward trend at 20. His jaundice continues to improve, and he no longer c/o itching. He denies N/V. He has had a small bowel movement, but feels constipated.      12/13 He has spoken with Dr. Hernandez " (oncology) and expressed understanding to him of the reasons he would be getting chemotherapy. He spoke also with Piedad and expressed he has chosen comfort care and Hospice. He reports that his pain was much better controlled yesterday and this morning once he was taking the oral dilaudid than previously. His bilirubin has come down today to 16.3.    Review of Systems:  Negative for agitation. Positive for pain. Negative for nausea and vomiting. Positive for constipation.        Physical Exam:  Physical Exam   Constitutional: He is oriented to person, place, and time. No distress.   Abdominal: Bowel sounds are normal. He exhibits distension. There is tenderness.   Neurological: He is alert and oriented to person, place, and time.   Skin: Rash noted. He is not diaphoretic.   jaundice   Psychiatric: He has a normal mood and affect. Thought content normal.   Vitals reviewed.      Lab Results:  Recent Labs      17   0152  17   0438  17   1341   SODIUM  134*  138  134*   POTASSIUM  4.0  4.3  4.0   CHLORIDE  104  105  103   CO2  24  23  25   GLUCOSE  119*  144*  123*   BUN  12  15  25*   CREATININE  0.91  0.86  0.96   CALCIUM  8.5  8.5  8.4*     Recent Labs      17   0504   WBC  6.8   RBC  2.68*   HEMOGLOBIN  8.9*   HEMATOCRIT  24.9*   MCV  92.9   MCH  33.2*   MCHC  35.7*   RDW  77.2*   PLATELETCT  232   MPV  12.0     Bilirubin 16.3    Hemodynamics:  Temp (24hrs), Av.1 °C (97 °F), Min:36.1 °C (97 °F), Max:36.1 °C (97 °F)  Temperature: 36.1 °C (97 °F)  Pulse  Av.9  Min: 49  Max: 85  Blood Pressure : 100/42       Medications:  • senna-docusate  2 Tab     • methadone  60 mg     • nicotine  21 mg         Problem List:  1. Epigastric pain - likely caused by pancreatic mass  2. Obstructive Jaundice causing pruritis   3. Pancreatic adenocarcinoma -5 CM  4. COPD - previous tobacco use  5. Hyperbilirubinemia  6. Hepatitis C  7. H/O IVDU -heroin  8. Methadone maintenance  9. PVD   10. Peripheral  Neuropathy    Plan:  1. Abdominal Pain  He can continue with Dilaudid 2.0mg Q3 hrs, Dilaudid tablet 8mg q3 hrs, and Methadone 60mg BID.      The complexities of managing his pain in the face of his previous addiction disorder have been discussed with him, and we encouraged him to not over use pain medications. After discussion, he will take the oral Dilaudid 8 mg q 3 h prn to improved pain control and was encouraged to ask for it.     Given increased risk of prolonged QTc with Methadone, we will continue to monitor Mg and K, give K-PHOS and repeat ECG 12/17.     2. Pancreatic Adenocarcinoma   He has chosen comfort care and [facility]. We recommend that this be at a skilled nursing facility. Until that can be arranged, the best option is that he start general inpatient hospice so that we can continue to maintain pain control. Before this decision, he had spoken with Dr. Hernandez (Oncology), who discussed with him that the goal of chemo would be to prolong life, but that it would not be curative and also that in order to be a candidate for chemotherapy, his bilirubin needs to further decrease. His friend, Rebecca Cardona, has been designated as the POA and understands his wishes and goals of care.        Code status discussed: DNR    Transition planning discussed:    Goals of care addressed:    Total visit time 35 minutes with 50% of the time spent with counseling and case coordination.    Medications reviewed. Labs Reviewed.

## 2017-12-13 NOTE — CONSULTS
DATE OF SERVICE:  12/12/2017    REASON FOR CONSULTATION:  Newly diagnosed pancreatic adenocarcinoma, likely   stage IV.    REQUESTING PHYSICIAN:  Dr. Yates.    HISTORY OF PRESENT ILLNESS:  He is a 67-year-old gentleman with a history of   hepatitis C and liver cirrhosis.  The patient was treated with hepatitis C a   year ago and was cured.  Apparently around 4 weeks ago, he developed jaundice   and an ultrasound showed gallstones.  A mass in the head of the pancreas and a   dilated duct was also seen.  The patient was scheduled as an outpatient for   an endoscopic ultrasound and ERCP on December 15.  Unfortunately, he developed   severe epigastric pain, and Dr. Will decided to bring him to the   hospital.  Upon admission, his bilirubin was 38.  Imaging showed massive   dilated ducts, 5-cm pancreatic head lesion, and dilated gallbladder with   stones.  Also, he noticed dark urine.  Further workup with CA 19-9 more than   20,000, CA-125 of 62.2, CEA 20.3, AFP 2.  On 02/07/2017, the patient underwent   an EGD and EUS, FNA, celiac neurolysis, and ERCP, which showed a large head   of the pancreatic mass with involvement/obliteration of the ampulla.  Unable   to find the biliary orifice, so ERCP was border.  On 12/08/2017, the patient   underwent for PTC and NPO at [facility].  The patient did have an   improvement of abdominal pain after celiac neurolysis.  Pathology consistent   with adenocarcinoma of the head of the pancreas.  Also, the celiac lymph node   biopsy was positive for metastatic adenocarcinoma as well.    Currently, the patient stating that the abdominal pain is better controlled.    Palliative care has been consulted in order to manage his abdominal pain.    Currently, he is taking Dilaudid 2 mg q.3 h. p.r.n. pain, also methadone 60 mg   b.i.d.  Palliative suggesting the dose of Dilaudid.  He continues to have   significant elevation of the total bilirubin of 20.1, even though he is   getting  better.  Alkaline phosphatase of 167 and liver transaminases are   normal.    He does have a 5-pound weight loss over the past week.  Before this hospital   admission, ECOG performance status of 0.  He is not drinking.  He does have a   history of IV drug abuse, which he acquired the hepatitis C, currently cured.    REVIEW OF SYSTEMS:  A 14-point review of systems is negative and as stated   above.    PAST MEDICAL HISTORY:  COPD, hepatitis C, arthritis, chronic pain, BPH, mood   disorder.    PAST SURGICAL HISTORY:  No pertinent surgical history.    MEDICATIONS:  He is on Dilaudid, he is on methadone 60 mg b.i.d.  He is on   Neurontin 600 mg t.i.d., enoxaparin 40 mg subQ daily, nicotine patch, he is on   Zofran.    SOCIAL HISTORY:  Patient denies smoking, alcohol, or drugs.  He does have a   history of IV drug abuse 30 years ago in the 70s.    PHYSICAL EXAMINATION:  VITAL SIGNS:  Temperature 36.8, pulse 85, respirations 24, blood pressure   100/65.  GENERAL:  The patient is icteric.  HEENT:  Mucous membranes are moist.  NECK:  No lymphadenopathy.  LUNGS:  Clear to auscultation.  CARDIOVASCULAR:  Tachycardic.  ABDOMEN:  Tender.  PTC in place, draining well.  Epigastric tenderness on   palpation.  EXTREMITIES:  Lower extremities with no swelling.  NEUROLOGIC:  A and O x3.  Cranial nerves II-XII grossly intact.    IMAGING STUDIES:  He had a CT of the chest/abdomen/pelvis on 02/06/2017 which   showed a 5 cm mass in the head of the pancreas causing obstruction of the   common bile duct and moderate intrahepatic biliary duct dilation, areas of   peripancreatic and periportal adenopathy.  Recently, there are several small   low attenuation lesions in the right lower lobe of the liver, which may   represent metastatic disease.  Multiple small bullae throughout the lung   fields with an exception of large bullous lesion in the left lower lobe, which   measures 17 cm in greatest diameter.  Extensive coronary artery  disease.    ASSESSMENT AND PLAN:  He is a 67-year-old gentleman with a history of   hepatitis C and liver cirrhosis, who finished treatment for hepatitis C 1 year   ago.  Currently, he is cured, coming in with significant abdominal pain and   jaundice, found to have a 5 cm pancreatic mass and peripancreatic and   periportal lymph node involvement.  Also, liver lesion suspicious for   metastatic disease.  The patient went for EUS confirming the finding of   pancreatic adenocarcinoma involving the head of the pancreas lesion and the   celiac axis lymph node.  At this point, my recommendation will be to obtain a   liver biopsy for further evaluation of the liver lesion.  Given he has a   history of liver cirrhosis and hepatitis C, concerned for hepatocellular   carcinoma, even though this is most likely stage IV pancreatic adenocarcinoma.    I did explain to the patient in order for him to be a candidate for    ochemotherapy, the total bilirubin needs to continue to improve.  He   understands stage IV pancreatic carcinoma is not a curable disease and   treatment is offered to prolong survival and quality of life.    Epigastric pain, currently being managed by palliative care with methadone and   Dilaudid.  Pain medication adjustment by palliative care.     Medical management by primary team.  I passed my recommendations to Dr. Yates.       ____________________________________     CAPRICE Mcadams    DD:  12/12/2017 14:08:09  DT:  12/12/2017 16:02:38    D#:  [id number]  Job#:  [id number]

## 2017-12-13 NOTE — PROGRESS NOTES
Assumed care of patient at 1915, received report from day RN at that time. Communication board updated and reviewed with pt. Assessment complete. Pt is on Comfort Care. Bed in the lowest locked position. Call light and personal belongings within reach. No other needs at this time. Hourly rounding in place. Will continue to monitor.

## 2017-12-13 NOTE — PROGRESS NOTES
[facility]ist Progress Note          Chief Complaint  67M w hx of hep C s/p treatment ([facility]), chronic resp failure with hypoxia due to COPD, admitted with worsening abdominal pain found to have 5cm pancreatic mass w obstruction.       Interval Problem Update  12/7: Plan for EUS at [facility], pain is improved w dilaudid and methadone.  Pending biopsy results prior to onc consult    12/8: Unsuccessful stent under EUS, planned for IR placement today.  Biopsy taken, results pending.  INR elevated, 2U FFP given    12/9: Stent placed in AM.  Bili still >39, marked jaundice.  Pain w relatively good control.  Becoming more confused/anxious    12/10: Bili improved after bili drain placed.  Constipated. Pending path    12/11: Bili trending down, lost IV and had to get it replaced.    12/12: Continues to have quite a bit of pain. Labs are slowly improving. Very anxious. No acute events overnight. Biliary drain remains in place    12/13: Changed to Comfort Care measures yesterday evening  Appears to be doing much worse today in regards to pain and anxiety     Consultants/Specialty  Dr. Alvarez - GI     Disposition  Biopsy pathology shows metastatic adenocarcinoma  Comfort Care measures  Hospice consultation placed           Review of Systems   Constitutional: Negative for chills, fever and +malaise/fatigue.   HENT: Negative for sore throat.    Respiratory: Negative for cough and shortness of breath.    Cardiovascular: Negative for chest pain and palpitations.   Gastrointestinal: Positive for abdominal pain, constipated. Negative for blood in stool, diarrhea, heartburn, nausea and vomiting.   Genitourinary: Negative for dysuria and frequency.   Musculoskeletal: Negative for back pain and myalgias.   Neurological: Negative for dizziness, focal weakness, weakness and headaches.   Psychiatric/Behavioral: Positive for anxious, forgetful. Occasionally confused.  Negative for depression and hallucinations.   All other systems reviewed  and are negative.      Physical Exam   Laboratory/Imaging   Hemodynamics  Temp (24hrs), Av.8 °C (98.2 °F), Min:36.6 °C (97.8 °F), Max:36.9 °C (98.5 °F)   Temperature: 36.6 °C (97.8 °F)  Pulse  Av.9  Min: 50  Max: 80    Blood Pressure : 107/67       Respiratory      Respiration: 17, Pulse Oximetry: 98 %        RUL Breath Sounds: Diminished, RML Breath Sounds: Diminished, RLL Breath Sounds: Diminished, NARESH Breath Sounds: Diminished, LLL Breath Sounds: Diminished     Fluids     Intake/Output Summary (Last 24 hours) at 17 1304  Last data filed at 17 0800    Gross per 24 hour   Intake                0 ml   Output              575 ml   Net             -575 ml         Nutrition        Orders Placed This Encounter   Procedures   • DIET NPO       Standing Status:   Standing       Number of Occurrences:   8       Order Specific Question:   Type:       Answer:   At Midnight [5]       Order Specific Question:   Restrict to:       Answer:   Sips with Medications [3]      Physical Exam   Constitutional: He is oriented to person, place, and time. He appears well-developed and well-nourished. Ill appearing. Moderate distress secondary to pain  HENT:   Head: Normocephalic.   Mouth/Throat: No oropharyngeal exudate.   Eyes: Conjunctivae are normal. Pupils are equal, round, and reactive to light. Scleral icterus is present.   Neck: Normal range of motion. No tracheal deviation present.   Cardiovascular: Normal rate and regular rhythm.    No murmur heard.  Pulmonary/Chest: Effort normal. No respiratory distress. He has no wheezes. He exhibits no tenderness.   Abdominal: Soft. He exhibits distension. There is tenderness. There is guarding.   Musculoskeletal: Normal range of motion. He exhibits no edema or tenderness.   Lymphadenopathy:     He has no cervical adenopathy.   Neurological: He is alert and oriented to person, place, and time. No cranial nerve deficit.   Skin: Skin is warm and dry. No rash noted.    Jaundiced   Psychiatric: Anxious, tangential at times.  Pleasant  Nursing note and vitals reviewed.           Recent Labs      12/06/17 0136  12/07/17   0318   WBC  8.1  5.2   RBC  3.32*  3.03*   HEMOGLOBIN  10.5*  9.6*   HEMATOCRIT  29.1*  26.8*   MCV  87.7  88.4   MCH  31.6  31.7   MCHC  36.1*  35.8*   RDW  77.1*  79.1*   PLATELETCT  220  185   MPV  11.2  11.4           Recent Labs      12/06/17 0136  12/07/17   0214   SODIUM  133*  135   POTASSIUM  3.8  4.1   CHLORIDE  101  104   CO2  21  21   GLUCOSE  123*  91   BUN  11  14   CREATININE  0.98  0.93   CALCIUM  8.7  8.2*          Recent Labs      12/06/17 0136   INR  1.74*                  Assessment/Plan           * Obstructive jaundice- (present on admission)   Assessment & Plan     Secondary to obstructive pancreatic cancer, marked bili elevation >38,Continues to trend down after bili drain placed.  IR placed drain 12/9  EUS done w biopsy taken on 12/7 - resultsShow metastatic adenocarcinoma in the head of the pancreas          Pancreatic cancer (CMS-HCC)- (present on admission)   Assessment & Plan     Advanced, likely with metastasis to the liver as seen on CT imaging.  He does have a hx ofChronic hepatitis C and has been treated/followed by [facility].  Scan in 6/2017 showed reactive peripancreatic lymph notes which could have suggested CA at that time.  Path still pending but tumor markers are elevated.  Patient at this point would like to answer questions re: chemotherapy options but is leaning towards comfort care  12/7: EUS, w celaic block and biopsy  12/9: IR for drain placement  Pain control with oxycodone and Dilaudid 8mg po q3  Methadone,Continue 60mg BID  IV dilaudid for breakthrough if needed.  Palliative care following  Inpatient hospice consulted  He does not want to pursue any further treatment and wants to be on comfort care measures only        Normocytic anemia   Assessment & Plan     No e/o blood loss, likely from malignancy  -Monitor        Hyponatremia   Assessment & Plan     Improved        Chronic respiratory failure with hypoxia (CMS-HCC)   Assessment & Plan     Due to COPD, wears 2L at baseline  -No acute complaints       Hepatitis C- (present on admission)   Assessment & Plan     S/p treatment still w cirrhosis, followed by GI       COAGULOPATHY  Due to cirrhosis/obstructive jaundice.  FFP given prior to stent  -Monitor  -No e/o bleeding     COPD (chronic obstructive pulmonary disease) (CMS-HCC)- (present on admission)   Assessment & Plan     Currently in no acute exacerbation  Continue Spiriva  Breathing treatments when necessary  RT protocol             Reviewed items::  EKG reviewed, Labs reviewed, Medications reviewed and Radiology images reviewed  Scales catheter::  No Scales

## 2017-12-13 NOTE — DISCHARGE PLANNING
Update Discharge Planning:  Dr. Yates will place order for hospice care.    Addendum:  Nurse Manager notified me that she has been trying to call pt's  but he is in a meeting. Pt is actively dying and wants to receive his last rites. I LVM for  but no call back has been received . I called the  and she is also trying to locate a  to help pt. ER SW paged.     Addendum:  CCS will look for .     Addendum:   here , will see pt and will call the  to give him his last rites.

## 2017-12-13 NOTE — CARE PLAN
Problem: Bowel/Gastric:  Goal: Will not experience complications related to bowel motility    Intervention: Assess baseline bowel pattern  Pt complaining of constipation, currently no bowel protocol in place. RN will       Problem: Psychosocial Needs:  Goal: Level of anxiety will decrease    Intervention: Identify and develop with patient strategies to cope with anxiety triggers  Pt stating he wants to stay in hospital and pass here, not alone at home. RN contacted [facility]'s [employer] [facility], member from [employer] will be in to see pt.   Pt very pleased.

## 2017-12-13 NOTE — DISCHARGE PLANNING
Received referral to see patient re: Advance Directives along with Certificate of Competency from [facility]. Met with patient - answered questions, completed document and notarized. Will scan into Epic. KATHY Cardona - [phone]

## 2017-12-13 NOTE — PROGRESS NOTES
Savannah Torres Fall Risk Assessment:     Last Known Fall: No falls  Mobility: Dizziness/generalized weakness  Medications: Cardiovascular or central nervous system meds  Mental Status/LOC/Awareness: Awake, alert, and oriented to date, place, and person, Oriented to person and place  Toileting Needs: Use of assistive device (Bedside commode, bedpan, urinal)  Volume/Electrolyte Status: No problems  Communication/Sensory: Visual (Glasses)/hearing deficit  Behavior: Depression/anxiety  Savannah Torres Fall Risk Total: 10  Fall Risk Level: LOW RISK    Universal Fall Precautions:  call light/belongings in reach, bed in low position and locked, wheelchairs and assistive devices out of sight, educate on level of risk, educate to call for assistance, clutter free and spill free environment, adequate lighting, use non-slip footwear, siderails up x 2    Fall Risk Level Interventions:   TRIAL (TELE 8, NEURO, MED HAROON 5) Low Fall Risk Interventions  Place yellow fall risk ID band on patient: verified  Provide patient/family education based on risk assessment: completed  Educate patient/family to call staff for assistance when getting out of bed: completed  Place fall precaution signage outside patient door: verified      Patient Specific Interventions:     Medication: review medications with patient and family  Mental Status/LOC/Awareness: reinforce falls education, encourage family to stay with patient and reinforce the use of call light  Toileting: instruct male patients prone to dizziness to void while sitting, instruct patient/family on the use of grab bars, instruct patient/family on the need to call for assistance when toileting and do not leave patient unattended in bathroom/refer to toileting scripting  Volume/Electrolyte Status: ensure patient remains hydrated and administer medications as ordered for nausea and vomiting  Communication/Sensory: update plan of care on whiteboard, ensure proper positioning when  transferrng/ambulating and ensure patient has glasses/contacts and hearing aids/dentures  Behavioral: encourage patient to voice feelings, administer medication as ordered, instruct/reinforce fall program rationale and encourage family to stay with impulsive patients  Mobility: ensure bed is locked and in lowest position and provide appropriate assistive device

## 2017-12-13 NOTE — DISCHARGE PLANNING
Received choice form for patients Hospice services, referral has been sent to [facility] per patient request.

## 2017-12-13 NOTE — CARE PLAN
Problem: Discharge Barriers/Planning  Goal: Patient's continuum of care needs will be met  Pt is on comfort care. Will follow orders.     Problem: Pain Management  Goal: Pain level will decrease to patient's comfort goal  Will medicate for pain PRN see MAR

## 2017-12-13 NOTE — PROGRESS NOTES
Assumed patient care at 0700. Received report from night shift. Assessment completed. A&Ox4, forgetful. C/o 7/10 pain, medicated per MAR. Up to restroom with one assist, shuffle gait d/t pain with movement. Bed alarm refused despite education. Pt wearing treaded socks. Personal possessions and call light placed within reach. Pt denies any additional needs at this time. Pt up to edge of bed for breakfast.

## 2017-12-14 NOTE — PROGRESS NOTES
Assumed patient care at 0700. Received report from night shift. Assessment completed. A&Ox3, forgetful, and having some unusual conversations. Stating he went to a dance last night with some celebrities. C/o 7/10 pain, scheduled pain medication given. Fentanyl PCA pump in use, rate verified with nightshift RN. Pt reports better pain relief. Pt wearing treaded socks. Personal possessions and call light placed within reach. Pt denies any additional needs at this time. Family and friend at bedside.

## 2017-12-14 NOTE — NON-PROVIDER
"Palliative Progress Note               Author: France Arredondo Date & Time created: 2017  12:03 PM     Interval History:  Mr. Barrios transitioned yesterday, to hospice general inpatient due to severe abdominal pain and significant difficulty managing this pain due to previous history of heroin addiction.  At this   point, the patient is wanting only comfort measures. He does have a friend, Rebecca Cardona, who is his durable power of  for healthcare and is agreeable to his transition to hospice as well.  Today, he is disoriented, confused and had addressed his friend, Rebecca, as \"Mom,\" characteristic of subacute delirium. He rates his pain at 7/10 and wished for his medications for pain control to be increased.      Review of Systems:  Positive for agitation. Positive for delirium. Negative for dyspnea. Positive for pain. Positive for sedation. Negative for nausea and vomiting.        Physical Exam:  Physical Exam   Constitutional: No distress.   Neurological:   Not oriented   Skin: Skin is warm and dry. He is not diaphoretic.   Psychiatric:   delirium       Lab Results:  Recent Labs      17   0438  17   1341   SODIUM  138  134*   POTASSIUM  4.3  4.0   CHLORIDE  105  103   CO2  23  25   GLUCOSE  144*  123*   BUN  15  25*   CREATININE  0.86  0.96   CALCIUM  8.5  8.4*             Hemodynamics:  Temp (24hrs), Av.4 °C (97.6 °F), Min:36.4 °C (97.6 °F), Max:36.4 °C (97.6 °F)  Temperature: 36.4 °C (97.6 °F)  Pulse  Av  Min: 64  Max: 64   Blood Pressure : 107/66       Medications:  • docusate sodium  100 mg      And   • senna-docusate  1 Tab     • methadone  60 mg         Problem List:  1.  Severe pancreatic cancer related abdominal pain.  2.  Large pancreatic mass, 5 cm, causing biliary obstruction.  3.  Significant hyperbilirubinemia and jaundice.  4.  History of hepatitis C.  5.  Pancreatic cancer with metastasis likely to liver.  6.  Depression.  7.  Constipation.  8.  History of " significant IV drug use and addictive disorder in the past.  9.  Coagulopathy.  INR currently 1.16.  10.  Do not resuscitate/hospice general inpatient.  11.  Subacute delirium     Plan:  1.   Severe pancreatic cancer related abdominal pain.  His pain remains at 7/10 so we can increase Fentanyl dose to 75 mg basal, 50 mg bolus    2. Anxiety  Pt. Has increased agitation today  Increase Lorazepam to 2-4 mg    3. Subacute delirium   He is agitated and confused today with some hallucination.   Start 1 mg haloperidol BID routine     4. Large pancreatic mass, 5 cm, causing biliary obstruction.  On 12/12 after speaking with the oncologist, he ultimately declined   radiation and chemotherapy. He was transitioned today to hospice   general inpatient    5. Do not resuscitate/hospice general inpatient.   If we are able to, we will attempt to transition the patient to a skilled   nursing facility, but that may be extremely difficult as the patient is   requiring high doses of IV medication to manage his pain.    Code status discussed: DNR    Transition planning discussed:    Goals of care addressed:    Total visit time  minutes with 50% of the time spent with counseling and case coordination.    Medications reviewed. Labs Reviewed.

## 2017-12-14 NOTE — PROGRESS NOTES
Savannah Torres Fall Risk Assessment:     Last Known Fall: No falls  Mobility: Dizziness/generalized weakness  Medications: Cardiovascular or central nervous system meds  Mental Status/LOC/Awareness: Awake, alert, and oriented to date, place, and person  Toileting Needs: Use of assistive device (Bedside commode, bedpan, urinal)  Volume/Electrolyte Status: No problems  Communication/Sensory: Visual (Glasses)/hearing deficit  Behavior: Depression/anxiety  Savannah Torres Fall Risk Total: 9  Fall Risk Level: LOW RISK    Universal Fall Precautions:  call light/belongings in reach, bed in low position and locked, wheelchairs and assistive devices out of sight, siderails up x 2, use non-slip footwear, adequate lighting, clutter free and spill free environment, educate on level of risk, educate to call for assistance    Fall Risk Level Interventions:   TRIAL (TELE 8, NEURO, MED HAROON 5) Low Fall Risk Interventions  Place yellow fall risk ID band on patient: verified  Provide patient/family education based on risk assessment: verified  Educate patient/family to call staff for assistance when getting out of bed: verified  Place fall precaution signage outside patient door: verified      Patient Specific Interventions:     Medication: review medications with patient and family, assess for medications that can be discontinued or dosage decreased and limit combination of prn medications  Mental Status/LOC/Awareness: not applicable  Toileting: consider obtaining elevated toilet seat or bedside commode (BSC), instruct patient/family on the use of grab bars, instruct patient/family on the need to call for assistance when toileting and do not leave patient unattended in bathroom/refer to toileting scripting  Volume/Electrolyte Status: ensure patient remains hydrated  Communication/Sensory: update plan of care on whiteboard and ensure patient has glasses/contacts and hearing aids/dentures  Behavioral: encourage patient to voice feelings  and administer medication as ordered  Mobility: utilize bed/chair fall alarm, ensure bed is locked and in lowest position, provide appropriate assistive device and instruct patient to exit bed on their strongest side

## 2017-12-14 NOTE — DISCHARGE PLANNING
Updated Clinical note: via chart review  Pain management  PCA fentanyl    Update Discharge Planning:  Hospice care    Discussed with IDT :  Confirmed by MD , pt is to remain under [facility] hospice.

## 2017-12-14 NOTE — PALLIATIVE CARE
Spiritual Care Note           Patient's Name: Tj Barrios   MRN: [id number]    Age and Gender: 67 y.o. male   YOB: 1950   Place of Residence: [place], [place]   Unit: Medical [place] [address]   Room (and Bed): [place] [address]  Bed 1   Medical Diagnosis(-es)/Procedure(s): epigastric pain - likely caused by pancreatic mass  obstructive jaundice causing pruritis   pancreatic mass  COPD   hyperbilirubinemia   hepatitis C  history of IV drug use - heroin  methadone maintenance  peripheral vascular disease   peripheral neuropathy   Voodoo Affiliation: Taoist   Code Status: Comfort Care/DNR    Date of Admission: 12/7/2017    Length of Stay: 6 days   Date of Visit: 12/13/2017        Situation/Reason for Visit:  Patient followed by palliative care.    Consultations/Referrals:  Patient asked for a .  Communicated request to Fr. Fadi Otero who will visit as soon as possible.    Continuing Care:  Will continue to follow.      Contact Information:  Chaplain CHAO Ravi  [phone]   [email or web address]

## 2017-12-14 NOTE — PROGRESS NOTES
Savannah Torrse Fall Risk Assessment:     Last Known Fall: No falls  Mobility: Dizziness/generalized weakness  Medications: Cardiovascular or central nervous system meds  Mental Status/LOC/Awareness: Awake, alert, and oriented to date, place, and person, Oriented to person and place  Toileting Needs: Use of assistive device (Bedside commode, bedpan, urinal)  Volume/Electrolyte Status: No problems  Communication/Sensory: Visual (Glasses)/hearing deficit  Behavior: Depression/anxiety  Savannah Torres Fall Risk Total: 10  Fall Risk Level: LOW RISK     Universal Fall Precautions:  call light/belongings in reach, bed in low position and locked, wheelchairs and assistive devices out of sight, educate on level of risk, educate to call for assistance, clutter free and spill free environment, adequate lighting, use non-slip footwear, siderails up x 2     Fall Risk Level Interventions:   TRIAL (TELE 8, NEURO, MED HAROON 5) Low Fall Risk Interventions  Place yellow fall risk ID band on patient: verified  Provide patient/family education based on risk assessment: completed  Educate patient/family to call staff for assistance when getting out of bed: completed  Place fall precaution signage outside patient door: verified       Patient Specific Interventions:      Medication: review medications with patient and family  Mental Status/LOC/Awareness: reinforce falls education, encourage family to stay with patient and reinforce the use of call light  Toileting: instruct male patients prone to dizziness to void while sitting, instruct patient/family on the use of grab bars, instruct patient/family on the need to call for assistance when toileting and do not leave patient unattended in bathroom/refer to toileting scripting  Volume/Electrolyte Status: ensure patient remains hydrated and administer medications as ordered for nausea and vomiting  Communication/Sensory: update plan of care on whiteboard, ensure proper positioning when  transferrng/ambulating and ensure patient has glasses/contacts and hearing aids/dentures  Behavioral: encourage patient to voice feelings, administer medication as ordered, instruct/reinforce fall program rationale and encourage family to stay with impulsive patients  Mobility: ensure bed is locked and in lowest position and provide appropriate assistive device

## 2017-12-14 NOTE — H&P
REASON FOR ADMISSION:  Intractable abdominal pain, pancreatic cancer, history   of IV drug use.    HISTORY OF PRESENT ILLNESS:  The patient is a 67-year-old male admitted   initially to [facility] on December 6th with a known   diagnosis of a pancreatic mass, but with increasing abdominal pain, requiring   management of this pain with IV medications.  Palliative care was asked to see   the patient on the 7th for assistance with pain management.  The patient's   chronic methadone was increased.  In addition, the patient was placed on   Neurontin and started on both IV and oral Dilaudid.  Since his   hospitalization, he has had an EGD with EUS and biopsy of the pancreatic mass,   attempt to pass internal stent without success and celiac plexus block, which   was ineffective in managing his pain.  The patient has had an external drain   placed in his common bile duct and his bilirubin, which was originally 38, is   now down to approximately 16.  In the process, the patient originally had a   pruritus, but that has improved quite a bit.  Pancreatic biopsy was consistent   with adenocarcinoma of the pancreas.  The patient also had a CA 19-9 of   greater than 20,000.  He did see the oncologist and ultimately declined   radiation and chemotherapy.  The patient was transitioned today to hospice   general inpatient due to severe abdominal pain and significant difficulty   managing this pain due to previous history of heroin addiction.  At this   point, the patient is wanting only comfort measures.  He does have a friend,   Rebecca Cardona, who is his durable power of  for healthcare and is   agreeable to his transition to hospice as well.    REVIEW OF SYSTEMS:  Anorexia, increased restlessness, significant abdominal   pain, nausea has resolved, constipation, pruritus has resolved.    PAST MEDICAL HISTORY:  Remarkable for COPD, hepatitis C, chronic pain,   depression, previous history of IV drug use  with heroin, abstinence for 15   years.    PAST SURGICAL HISTORY:  Negative.    ALLERGIES:  INDOCIN AND CORTISONE.    PERTINENT LABORATORY DATA:  H and H 10.5 and 29.1, and total bilirubin is 16.    Code status is currently do not resuscitate.    PHYSICAL EXAMINATION:  VITAL SIGNS:  Temperature 98.9, respiratory rate is 22, heart rate 98, blood   pressure is 115/66, pulse ox on 2 liters 98%.  GENERAL:  Today, he is a bit calmer than he has been in the past couple of   days; however, it is reported that he becomes quite restless prior to his next   pain medication dose.  He is very jaundiced.  HEENT:  Pupils are 3.5 mm and reactive.  Extraocular muscles are intact.    Conjunctivae are quite icteric.  Mouth, mucous membranes are dry.  No exudate   or lesion.  CARDIAC:  S1, S2.  No murmur.  LUNGS:  Good air movement in all lung fields.  ABDOMEN:  Distended with significant tenderness over the mid epigastric area.    Bowel sounds are present.  EXTREMITIES:  No edema.    PERTINENT X-RAYS:  CT of the abdomen shows a 5 cm mass in the head of the   pancreas causing obstruction of the common bile duct and marked intrahepatic   biliary ductal dilation.  There is peripancreatic and portal adenopathy and   several small low attenuation lesions in the right lobe of the liver,   suggestive of metastatic disease.    PROBLEM LIST:  1.  Severe pancreatic cancer related abdominal pain.  2.  Large pancreatic mass, 5 cm, causing biliary obstruction.  3.  Significant hyperbilirubinemia and jaundice.  4.  History of hepatitis C.  5.  Pancreatic cancer with metastasis likely to liver.  6.  Depression.  7.  Constipation.  8.  History of significant IV drug use and addictive disorder in the past.  9.  Coagulopathy.  INR currently 1.16.  10.  Do not resuscitate/hospice general inpatient.    DISCUSSION:  I have had several long talks with the patient over the past   several days.  He has been very ambivalent about whether or not he would    receive any disease modifying therapy.  His greatest and most intense fear is   dying in pain.  He is very aware of the return of his addictive type behaviors   and acknowledges that he is at high risk for having considerable pain due to   his previous heroin use.  At this point, his abdominal pain is rated at an   8/10.  Prior to his oral Dilaudid use, the patient's pain will escalate and he   will become quite agitated and irritable.  After discussion with him and his   power of , Rebecca Cardona, the patient is wanting improved pain control   and would strongly benefit from an opioid drip.  We are currently very low on   Dilaudid, which is what he has been using, so I will start him on a fentanyl   drip at 50 mcg per hour with 25 mcg bolus every 15 minutes p.r.n. breakthrough   pain.  In addition, I will start him on a frequent dosing of lorazepam as   needed for anxiety and haloperidol if he has increasing agitation or delirium.    If we are able to, we will attempt to transition the patient to a skilled   nursing facility, but that may be extremely difficult as the patient is   requiring high doses of IV medication to manage his pain.       ____________________________________     MD ADOLFO HANDY / DIPIKA    DD:  12/13/2017 16:31:06  DT:  12/13/2017 17:14:32    D#:  [id number]  Job#:  [id number]

## 2017-12-14 NOTE — PROGRESS NOTES
Received bedside report from day RN and assumed care of patient at 1900. Comfort care pt is alert and oriented x4. Fentanyl PCA pump running per MAR; cosigned with day RN. Pt verbalized understanding on use of PCA. Safety precautions in place including patient call light within reach, bed alarm n/a per HD assessment, personal possessions nearby, bed in low position and locked, hourly rounding in practice, and non-skid socks in place. Family at bedside providing support.

## 2017-12-14 NOTE — DISCHARGE SUMMARY
CHIEF COMPLAINT ON ADMISSION  No chief complaint on file.      CODE STATUS  Prior    HPI & HOSPITAL COURSE  This is a 67 y.o. male here with abdominal pain and jaundice. Patient has a history of hepatitis C for which he had been treated. He presented with jaundice other than ongoing for about 2 weeks and he was seen by his PCP. At some point in time he had an abdominal ultrasound which revealed a pancreatic mass. Patient was initially admitted for probable pancreatic cancer and GI was consulted. Imaging revealed that there were lesions on the liver consistent with probable metastasis.  The patient had an EUS which confirmed the finding of a pancreatic adenocarcinoma involving the head of the pancreas. There is also celiac axis lymph nodes and liver lesions. Stage IV pancreatic adenocarcinoma. During the hospital course he had severe pain. Palliative care was consulted and the patient was put on various different pain medications. With this adjustment this pain was better controlled but he continued to have pain.  When the patient had an EUS a stent placement was attempted but this is unsuccessful. Interventional radiology was subsequently consulted and a stent was placed and the biliary drain. With the biliary drain his bilirubin steadily improved but it remained quite elevated.  Palliative care continue to follow alongside. We had multiple discussions with the patient regarding code status and further plan of care and prognosis. Initially the patient wanted to be changed over to a DNR code status which was done. Then he decided that he does not want to pursue any further treatment. This occurred when we were discussing having a CT-guided biopsy of the liver lesion. Once he wanted to go on to comfort care measures inpatient hospice was consulted and the patient was transferred onto their service.  Therefore, he is discharged in guarded and stable condition with close outpatient follow-up.    SPECIFIC OUTPATIENT  FOLLOW-UP  None    DISCHARGE PROBLEM LIST  Principal Problem:    Obstructive jaundice POA: Yes  Active Problems:    Pancreatic cancer (CMS-HCC) POA: Yes    COPD (chronic obstructive pulmonary disease) (CMS-HCC) POA: Yes    Hepatitis C POA: Yes    Chronic pain POA: Yes    Chronic respiratory failure with hypoxia (CMS-HCC) POA: Yes    Normocytic anemia POA: Yes    Coagulopathy (CMS-HCC) POA: Unknown  Resolved Problems:    * No resolved hospital problems. *      FOLLOW UP  Future Appointments  Date Time Provider Department Center   1/30/2018 12:45 PM Papo Traore M.D. [facility] None     Orion Santana M.D.  [address]    [place] [address]  [phone]          Aundrea Joyner M.D.  [address] [place] [id number] [phone]            MEDICATIONS ON DISCHARGE  There are no discharge medications for this patient.       DIET  No orders of the defined types were placed in this encounter.      ACTIVITY  As tolerated.  Weight bearing as tolerated      CONSULTATIONS  Palliative care  Gastroenterology  Interventional radiology  Oncology    PROCEDURES  EUS and biliary stent and drain placement    LABORATORY  Lab Results   Component Value Date/Time    SODIUM 134 (L) 12/13/2017 01:41 PM    POTASSIUM 4.0 12/13/2017 01:41 PM    CHLORIDE 103 12/13/2017 01:41 PM    CO2 25 12/13/2017 01:41 PM    GLUCOSE 123 (H) 12/13/2017 01:41 PM    BUN 25 (H) 12/13/2017 01:41 PM    CREATININE 0.96 12/13/2017 01:41 PM        Lab Results   Component Value Date/Time    WBC 6.8 12/11/2017 05:04 AM    HEMOGLOBIN 8.9 (L) 12/11/2017 05:04 AM    HEMATOCRIT 24.9 (L) 12/11/2017 05:04 AM    PLATELETCT 232 12/11/2017 05:04 AM        Total time of the discharge process exceeds 45 minutes

## 2017-12-14 NOTE — PROGRESS NOTES
Patient upset regarding Fentanyl PCA vs Dilaudid. Per pt, Dr. Joyner discussed pt's preference of Dilaudid PCA. Pt updated on reasoning for Fentanyl vs Dilaudid (shortage of iv dilaudid), Pt is worried that current medication is not going to control his pain. Pt educated on Fentanyl medication and its use. Pt requested for Anya FARMER, to be contacted. This RN paged Margaret FARMER hospice RN available to answer questions. Margaret spoke to pt as well regarding concerns.   Per Dr. Joyner, pt continuous dose to be increased from 50mcg/hr to 75mcg/hr, however, pt declined change. Stated he is comfortable with current continuous dose and he will let nightshift RN know if pain is not controlled and he wishes for continuous dose to be increased.

## 2017-12-15 NOTE — PROGRESS NOTES
Savannah Torres Fall Risk Assessment:     Last Known Fall: No falls  Mobility: Immobilized/requires assist of one person  Medications: Cardiovascular or central nervous system meds  Mental Status/LOC/Awareness: Lethargic/oriented to person only  Toileting Needs: Use of assistive device (Bedside commode, bedpan, urinal)  Volume/Electrolyte Status: No problems  Communication/Sensory: Visual (Glasses)/hearing deficit  Behavior: Depression/anxiety  Savannah Torres Fall Risk Total: 12  Fall Risk Level: MODERATE RISK    Universal Fall Precautions:  call light/belongings in reach, bed in low position and locked, wheelchairs and assistive devices out of sight, siderails up x 2, use non-slip footwear, adequate lighting, clutter free and spill free environment, educate on level of risk, educate to call for assistance    Fall Risk Level Interventions:   TRIAL (Tripl 8, NEURO, MED HAROON 5) Low Fall Risk Interventions  Place yellow fall risk ID band on patient: verified  Provide patient/family education based on risk assessment: verified  Educate patient/family to call staff for assistance when getting out of bed: verified  Place fall precaution signage outside patient door: verifiedTRIAL (Tripl 8, NEURO, MED HAROON 5) Moderate Fall Risk Interventions  Place yellow fall risk ID band on patient: verified  Provide patient/family education based on risk assessment : completed  Educate patient/family to call staff for assistance when getting out of bed: completed  Place fall precaution signage outside patient door: verified  Utilize bed/chair fall alarm: completed     Patient Specific Interventions:     Medication: review medications with patient and family  Mental Status/LOC/Awareness: reorient patient, encourage family to stay with patient, utilize bed/chair fall alarm and reinforce the use of call light  Toileting: consider obtaining elevated toilet seat or bedside commode (BSC), provide frquent toileting, instruct patient/family on the  use of grab bars, instruct patient/family on the need to call for assistance when toileting and do not leave patient unattended in bathroom/refer to toileting scripting  Volume/Electrolyte Status: ensure patient remains hydrated  Communication/Sensory: update plan of care on whiteboard  Behavioral: encourage patient to voice feelings and administer medication as ordered  Mobility: utilize bed/chair fall alarm, ensure bed is locked and in lowest position, provide appropriate assistive device and instruct patient to exit bed on their strongest side

## 2017-12-15 NOTE — PROGRESS NOTES
Received bedside report from day RN and assumed care of patient at 1900. Comfort care pt is alert and oriented xSELF. Fentanyl PCA pump running per MAR; cosigned with day RN. IV infiltrated in R upper arm. New patent IV placed in L upper chest. Safety precautions in place including patient call light within reach, bed alarm on, personal possessions nearby, bed in low position and locked, hourly rounding in practice, and non-skid socks in place. Family at bedside providing support.

## 2017-12-15 NOTE — PROGRESS NOTES
Assumed patient care at 0700. Received report from night shift. Assessment completed. A&Ox1, self only. Confused with mumbled speech. Fentanyl PCA pump in use, rate verified with nightshift RN. Q2h turns in place. Bed alarm on, pt wearing treaded socks. Personal possessions and call light placed within reach. Pt denies any additional needs at this time. Family and friend at bedside.

## 2017-12-15 NOTE — PROGRESS NOTES
MD paged regarding piv access loss and inability to use PCA at this time. Received one time order for IM ativan 2mg, as well as morphine sublingual 20mg PRN incase access is lost. Order read back to Dr. Silva.

## 2017-12-16 NOTE — HOSPICE
Daily rounds on patient due to GIP status. Pt had pain crisis when peripheral IV access was lost earlier this am. Dr Silva provided RN with new pain med orders in case of loss of IV access again. Pt back on PCA fentanyl at basal rate of 75 mcg/hr with optional 50mcg dose every 15 minutes. Pt appeared very comfortable at time of visit. Per primary nurse pt is also getting Haldol BID and lorazepam every 3 hours RTC. Scales placed today as mobility is declining, pt has progressive weakness, jaundice present, no food today, only taking sips of water. Pt oriented to person only today, possible near death awareness, told nurse he wants to die in the hospital. Continues to meet criteria for GIP admission at this time. Discussed with Dr Silva. Will follow.

## 2017-12-16 NOTE — PROGRESS NOTES
Savannah Torres Fall Risk Assessment:     Last Known Fall: No falls  Mobility: Use of assistive device/requires assist of two people  Medications: Cardiovascular and central nervous system meds  Mental Status/LOC/Awareness: Memory loss/confusion and requires reorienting  Toileting Needs: Use of catheters or diversion devices  Volume/Electrolyte Status: No problems  Communication/Sensory: Visual (Glasses)/hearing deficit  Behavior: Depression/anxiety, Behavioral noncompliance with instruction, Impulsiveness  Savannah Torres Fall Risk Total: 20  Fall Risk Level: HIGH RISK    Universal Fall Precautions:  call light/belongings in reach, bed in low position and locked, wheelchairs and assistive devices out of sight, siderails up x 2, use non-slip footwear, adequate lighting, clutter free and spill free environment, educate on level of risk, educate to call for assistance    Fall Risk Level Interventions:   TRIAL (TELE 8, NEURO, MED HAROON 5) Low Fall Risk Interventions  Place yellow fall risk ID band on patient: verified  Provide patient/family education based on risk assessment: verified  Educate patient/family to call staff for assistance when getting out of bed: verified  Place fall precaution signage outside patient door: verifiedTRIAL (TELE 8, NEURO, MED HAROON 5) Moderate Fall Risk Interventions  Place yellow fall risk ID band on patient: verified  Provide patient/family education based on risk assessment : completed  Educate patient/family to call staff for assistance when getting out of bed: completed  Place fall precaution signage outside patient door: verified  Utilize bed/chair fall alarm: completedTRIAL (TELE 8, NEURO, [employer] HAROON 5) High Fall Risk Interventions  Place yellow fall risk ID band on patient: verified  Provide patient/family education based on risk assessment: completed  Educate patient/family to call staff for assistance when getting out of bed: completed  Place fall precaution signage outside patient  door: verified  Place patient in room close to nursing station: currently not available/charge notified  Utilize bed/chair fall alarm: verified  Notify charge of high risk for huddle: verified    Patient Specific Interventions:     Medication: not applicable  Mental Status/LOC/Awareness: reorient patient, reinforce falls education, check on patient hourly, utilize bed/chair fall alarm and reinforce the use of call light  Toileting: toilet prior to giving pain medications  Volume/Electrolyte Status: ensure patient remains hydrated  Communication/Sensory: update plan of care on whiteboard  Behavioral: administer medication as ordered  Mobility: utilize bed/chair fall alarm and ensure bed is locked and in lowest position

## 2017-12-16 NOTE — PROGRESS NOTES
Spoke to hospice Rn.  Pt. Is restless, attempting to get out of bed, complaining of abdominal pain. Ativan 2mg IV and Haldol 1 mg already given.   Stated she lewis Peoples.    Spoke to Dr. Silva, informed of same.  Ordered to increase Fentanyl basal continuous dose to 100 mcg and increase Haldol to 2mg IV q4h PRN.  Will implement orders

## 2017-12-16 NOTE — PROGRESS NOTES
Received report night, Rn.  Pt.appears restless and confused,  was on the edge of the bed, stated he needed to go to the bathroom, educated pt. That it is unsafe to get him up due to medications he is taking.  Assisted patient back into bed, provided comfort care to patient.  Instructed pt. On use of pain pump.   Bed alarm functional and in place.  Fentanyl drip noted.  Verified rate with night Rn.  Biliary drain noted: dressing: clean, dry, and intact.  Scales catheter intact.  Bed in lowest position.  Will conitnue to monitor

## 2017-12-17 NOTE — PROGRESS NOTES
0920 Called in to pt's room by hospice RN. Pt restless, attempts to get oob. Denies pain. Asked for water. Pauline liquids, no s/s of dysphagia noted. Medicated w/ scheduled ativan and haltol. VSS, afebrile. O2 2l per nc sats >95%.

## 2017-12-17 NOTE — PROGRESS NOTES
Assumed care @ 0715. Bedside report from CHANCE Bassett. Pt sleeping and in no distress. Bed in low position, call light w/in reach. Will continue to monitor comfort and safety. Strip bed alarm on.

## 2017-12-17 NOTE — CARE PLAN
Problem: Safety  Goal: Free from accidental injury  Outcome: PROGRESSING AS EXPECTED  Bed low, bed alarm on, side rails up x 2 and call light within reach.  Hourly checks conducted.    Problem: Pain  Goal: Alleviation of Pain or a reduction in pain to the patient's comfort goal  Outcome: PROGRESSING AS EXPECTED  Continuous pain medications infusing with PCA.  Patient is reminded to push button if additional medications needed.

## 2017-12-18 NOTE — PROGRESS NOTES
Called hospice nurse for better meds.  Dr. Joyner here new orders.  Finally calmer and not trying to get out of bed.  Moaning but not answering questions.  Will try to get subq line in for cont fentynyl.  Friends called.

## 2017-12-18 NOTE — PROGRESS NOTES
IV infiltrated, attempt to start new IV in progress. Pt lethargic HR 97, RR 20. O2 2l per NC sats >97%. Repositioned.

## 2017-12-18 NOTE — PROGRESS NOTES
Savannah Torres Fall Risk Assessment:     Last Known Fall: No falls  Mobility: Use of assistive device/requires assist of two people  Medications: Cardiovascular and central nervous system meds  Mental Status/LOC/Awareness: Memory loss/confusion and requires reorienting  Toileting Needs: Use of catheters or diversion devices  Volume/Electrolyte Status: No problems  Communication/Sensory: Visual (Glasses)/hearing deficit  Behavior: Impulsiveness  Savannah Torres Fall Risk Total: 17  Fall Risk Level: HIGH RISK    Universal Fall Precautions:  call light/belongings in reach, bed in low position and locked, wheelchairs and assistive devices out of sight, siderails up x 2, use non-slip footwear, adequate lighting, clutter free and spill free environment, educate on level of risk, educate to call for assistance    Fall Risk Level Interventions:   TRIAL ([employer] 8, NEURO, MED HAROON 5) Low Fall Risk Interventions  Place yellow fall risk ID band on patient: verified  Provide patient/family education based on risk assessment: verified  Educate patient/family to call staff for assistance when getting out of bed: verified  Place fall precaution signage outside patient door: verifiedTRIAL ([employer] 8, NEURO, MED HAROON 5) Moderate Fall Risk Interventions  Place yellow fall risk ID band on patient: verified  Provide patient/family education based on risk assessment : completed  Educate patient/family to call staff for assistance when getting out of bed: completed  Place fall precaution signage outside patient door: verified  Utilize bed/chair fall alarm: completedTRIAL ([employer] 8, NEURO, [employer] HAROON 5) High Fall Risk Interventions  Place yellow fall risk ID band on patient: verified  Provide patient/family education based on risk assessment: completed  Educate patient/family to call staff for assistance when getting out of bed: completed  Place fall precaution signage outside patient door: verified  Place patient in room close to nursing station:  currently not available/charge notified  Utilize bed/chair fall alarm: verified  Notify charge of high risk for huddle: completed    Patient Specific Interventions:     Medication: not applicable  Mental Status/LOC/Awareness: reorient patient, reinforce falls education, check on patient hourly, utilize bed/chair fall alarm and reinforce the use of call light  Toileting: provide frquent toileting  Volume/Electrolyte Status: ensure patient remains hydrated  Communication/Sensory: update plan of care on whiteboard and ensure patient has glasses/contacts and hearing aids/dentures  Behavioral: administer medication as ordered  Mobility: ensure bed is locked and in lowest position

## 2017-12-18 NOTE — PROGRESS NOTES
Assumed care of pt, received report from day shift RN, pt assessed.  Pt hospice with pain assumed, pt medicated for pain per MAR.  Pt is lethargic and not answering questions, unable to assess A&O status.  Pt high fall risk, wearing treaded socks, bed locked and in lowest position, bed alarm no on.  Call light, phone, and personal belongings within reach, all pt needs met at this time.

## 2017-12-18 NOTE — PROGRESS NOTES
Sleeping on /off t/o shift. Moments of restlessness and agitation. When awake pt able to ask for water. Taking liquids w/out problems as long hob elevated. Refusing meals, only wants water. Turns and repositions self in bed when awake. Pt needs reminders to use pca fentanyl if in pain.

## 2017-12-18 NOTE — CARE PLAN
Problem: Safety  Goal: Free from accidental injury  Outcome: PROGRESSING AS EXPECTED  Pt high fall risk, pt wearing treaded socks, bed locked and in lowest position, bed alarm not on.  Pt call light and phone within reach.    Problem: Pain  Goal: Alleviation of Pain or a reduction in pain to the patient's comfort goal  Outcome: PROGRESSING AS EXPECTED  Pt lost PIV, PO roxanol 20mg frequency changed to every hour as needed.

## 2017-12-18 NOTE — DISCHARGE PLANNING
Updated Clinical note: via chart review  Continous infusion of  Fentayl  Ativan prn    Update Discharge Planning:  Hospice GIP

## 2017-12-19 NOTE — PROGRESS NOTES
Savannah Torres Fall Risk Assessment:     Last Known Fall: No falls  Mobility: Use of assistive device/requires assist of two people  Medications: Cardiovascular and central nervous system meds  Mental Status/LOC/Awareness: Memory loss/confusion and requires reorienting  Toileting Needs: Incontinence  Volume/Electrolyte Status: No problems  Communication/Sensory: Visual (Glasses)/hearing deficit  Behavior: Impulsiveness  Savannah Torres Fall Risk Total: 19  Fall Risk Level: HIGH RISK    Universal Fall Precautions:  call light/belongings in reach, bed in low position and locked, wheelchairs and assistive devices out of sight, siderails up x 2, use non-slip footwear, adequate lighting, clutter free and spill free environment, educate on level of risk, educate to call for assistance    Fall Risk Level Interventions:   TRIAL ([employer], NEURO, MED HAROON 5) Low Fall Risk Interventions  Place yellow fall risk ID band on patient: verified  Provide patient/family education based on risk assessment: verified  Educate patient/family to call staff for assistance when getting out of bed: verified  Place fall precaution signage outside patient door: verifiedTRIAL ([employer] 8, NEURO, MED HAROON 5) Moderate Fall Risk Interventions  Place yellow fall risk ID band on patient: verified  Provide patient/family education based on risk assessment : completed  Educate patient/family to call staff for assistance when getting out of bed: completed  Place fall precaution signage outside patient door: verified  Utilize bed/chair fall alarm: completedTRIAL ([employer] 8, NEURO, [employer] HAROON 5) High Fall Risk Interventions  Place yellow fall risk ID band on patient: verified  Provide patient/family education based on risk assessment: completed  Educate patient/family to call staff for assistance when getting out of bed: completed  Place fall precaution signage outside patient door: verified  Place patient in room close to nursing station: currently not  available/charge notified  Utilize bed/chair fall alarm: verified  Notify charge of high risk for huddle: completed    Patient Specific Interventions:     Medication: review medications with patient and family  Mental Status/LOC/Awareness: check on patient hourly and utilize bed/chair fall alarm  Toileting: not applicable  Volume/Electrolyte Status: ensure patient remains hydrated  Communication/Sensory: update plan of care on whiteboard  Behavioral: administer medication as ordered  Mobility: utilize bed/chair fall alarm

## 2017-12-19 NOTE — PROGRESS NOTES
Report received. Assumed care at 0700, assessment complete. Patient is resting comfortably. Bed in lowest position. Waiting on pharmacy to send fentanyl up.

## 2017-12-19 NOTE — CARE PLAN
Problem: Skin Integrity  Goal: Skin Integrity is maintained  Outcome: PROGRESSING AS EXPECTED  Pt being turned every 2 hours as tolerated    Problem: Pain  Goal: Alleviation of Pain or a reduction in pain to the patient's comfort goal  Outcome: PROGRESSING AS EXPECTED  Pt on 100 mcg/hour subq fentanyl infusion, 60 mg methadone solution every 8 hours, and PRN q1 hour 20 mg oxycodone solution

## 2017-12-19 NOTE — PROGRESS NOTES
Assumed care of pt at 0730am. Report received and bedside rounding completed with noc RN. Pt in bed resting comfortably all drains intact. On fentanyl drip. PRN medications given as needed. Turning pt for comfort. Pt moaning with turns. Not eating or drinking.  See flowsheets for further assessment.

## 2017-12-20 NOTE — PROGRESS NOTES
Patient  907. Two RN pronouncement Chary Coto and Carolyne Washington. Family friend Rebecca Cardona notified at 912, NAM, Hospice Services and physician notified 920. Postmortem care complete, kaur and IV removed. Biliary drain was not able to be removed.      notified. Tissue donation notified, pt not candidate for tissue donation due to Hep C.     Personal belongings given to security.

## 2017-12-20 NOTE — PROGRESS NOTES
Savannah Torres Fall Risk Assessment:     Last Known Fall: No falls  Mobility: Use of assistive device/requires assist of two people  Medications: Cardiovascular and central nervous system meds  Mental Status/LOC/Awareness: Memory loss/confusion and requires reorienting  Toileting Needs: Incontinence  Volume/Electrolyte Status: No problems  Communication/Sensory: Visual (Glasses)/hearing deficit  Behavior: Impulsiveness  Savannah Torres Fall Risk Total: 19  Fall Risk Level: HIGH RISK    Universal Fall Precautions:  call light/belongings in reach, siderails up x 2    Fall Risk Level Interventions:   TRIAL (TELE 8, NEURO, MED HAORON 5) Low Fall Risk Interventions  Place yellow fall risk ID band on patient: verified  Provide patient/family education based on risk assessment: verified  Educate patient/family to call staff for assistance when getting out of bed: verified  Place fall precaution signage outside patient door: verifiedTRIAL (TELE 8, NEURO, MED HAROON 5) Moderate Fall Risk Interventions  Place yellow fall risk ID band on patient: verified  Provide patient/family education based on risk assessment : completed  Educate patient/family to call staff for assistance when getting out of bed: completed  Place fall precaution signage outside patient door: verified  Utilize bed/chair fall alarm: completedTRIAL (TELE 8, NEURO, [employer] HAROON 5) High Fall Risk Interventions  Place yellow fall risk ID band on patient: verified  Provide patient/family education based on risk assessment: verified  Educate patient/family to call staff for assistance when getting out of bed: verified  Place fall precaution signage outside patient door: verified  Place patient in room close to nursing station: verified  Utilize bed/chair fall alarm: verified  Notify charge of high risk for huddle: verified    Patient Specific Interventions:     Medication: not applicable  Mental Status/LOC/Awareness: check on patient hourly and utilize bed/chair fall  alarm  Toileting: not applicable  Volume/Electrolyte Status: not applicable  Communication/Sensory: not applicable  Behavioral: not applicable  Mobility: utilize bed/chair fall alarm and ensure bed is locked and in lowest position

## 2017-12-20 NOTE — CARE PLAN
Problem: Pain  Goal: Alleviation of Pain or a reduction in pain to the patient's comfort goal    Intervention: Pain Management--Medications  Pain and sedative medications given around the clock

## 2017-12-20 NOTE — CARE PLAN
Problem: Skin Integrity  Goal: Skin Integrity is maintained    Intervention: Turn every 2 hours  Pt to be gently turned every 2 hours

## 2017-12-20 NOTE — PROGRESS NOTES
Pt unable to tolerate PO, solution medications given very slowly to oral membranes. HOB raised to 70 degrees when PO meds administered. HOB lowered back down to 30 for rest. Pt unresponsive.

## 2017-12-20 NOTE — PROGRESS NOTES
Pt temperature elevated to 103.5; HR stable at 120; Respirations about 40, moderately labored; pt still unresponsive. Ice packs laid on chest to lower core body temperature. Will continue to monitor closely.

## 2017-12-28 NOTE — DISCHARGE SUMMARY
FINAL DIAGNOSES:  1.  Severe pancreatic cancer related abdominal pain.  2.  Large pancreatic mass, 5 cm, causing biliary obstruction.  3.  Adenocarcinoma of the pancreas with metastasis to liver.  4.  Significant history of IV drug abuse and addictive disorder in the past.  5.  Hyperbilirubinemia and jaundice.  6.  History of hepatitis C.  7.  History of depression.  8.  Coagulopathy.  9.  Opioid induced constipation.  10.  Hospice general inpatient for management of significant pain and   agitation.    HISTORY OF PRESENT ILLNESS:  The patient was a 67-year-old  male   admitted to [facility] for management of pain related to a   known pancreatic mass.  The patient had a history of IV drug abuse with   heroin 15 years prior to this admission, but had been clean for 15 years.  The   patient did have a celiac plexus block, which was ineffective and attempts to   pass a stent were also ineffective.  He did have a biliary drain and his   bilirubin did decline from 38 to 16.  However, the patient became more   agitated and restless with increasing pain during his hospitalization.  His   admitting CA 19-9 was greater than 20,000.  The patient did see the oncologist   and ultimately did not feel that he would tolerate any chemotherapy.  He   wished only for comfort measures, but due to the fact that he had such severe   pain and agitation related to his cancer and previous substance use disorder,   the patient was transitioned to hospice general inpatient.  His dose of   methadone was increased from 60 mg b.i.d. to 60 mg sublingual t.i.d. as well   as he required increasing doses of IV fentanyl, lorazepam, and haloperidol.    Unfortunately, several days into his general inpatient stay as he was becoming   more comfortable, the patient's IV access was lost and we were unable to get   another IV.  He was transitioned to sublingual medications, which were not   effective in managing his pain.  We  ultimately had to start him on IM   Thorazine at 100 mg q. 8 hours routine to keep the patient sedated.  His   sublingual morphine was switched to sublingual oxycodone and his methadone was   continued at the current dose and ultimately the patient became more   comfortable and finally  quietly on .  The patient's   friend, Rebecca Cardona, will be entered into hospice bereavement support.       ____________________________________     MD ADOLFO HANDY / NTS    DD:  2017 16:09:43  DT:  2017 16:58:22    D#:  [id number]  Job#:  [id number]

## 2023-03-03 NOTE — PROGRESS NOTES
Fall Prevention for Older Adults   WHAT YOU NEED TO KNOW:   What is fall prevention? Fall prevention includes ways to make your home and other areas safer  It also includes ways you can move more carefully to prevent a fall  What increases my risk for falls? As you age, your muscles weaken and your risk for falls increases  Your risk also increases if you take medicines that make you sleepy or dizzy  You may also be at risk if you have vision or joint problems, have low blood pressure, or are not active  How can I help protect myself from falls? Falls are a common cause of injury for older adults  Do the following to help protect yourself:  Stay active  Exercise can help strengthen your muscles and improve your balance  Your healthcare provider may recommend water aerobics, walking, or [employer]  He or she may also recommend physical therapy to improve your coordination  Never start an exercise program without asking your healthcare provider first             Wear shoes that fit well and have soles that   Wear shoes both inside and outside  Use slippers with good   Avoid shoes with high heels  Use assistive devices as directed  Your healthcare provider may suggest that you use a cane or walker to help you keep your balance  You may need to have grab bars put in your bathroom near the toilet or in the shower  Stand or sit up slowly  This may help you keep your balance and prevent falls  Wear a personal alarm  This is a device that allows you to call 911 if you need help  Ask your healthcare provider for more information  Manage your medical conditions  Keep all appointments with your healthcare providers  Visit your eye doctor as directed  How can I make my home safer? Add items to prevent falls in the bathroom  Put nonslip strips on your bath or shower floor to prevent you from slipping  Use a bath mat if you do not have carpet in the bathroom   This will prevent you from Rounding completed on pt. Report given at bedside between night shift RN and day shift RN. Pt resting quietly, requesting pain meds, pt going down for procedure this am, pt instructed that he will be medicated during/for procedure, pain meds when he returns if needed. Pt denies other needs, call light in reach. Assumed care of pt.    falling when you step out of the bath or shower  Use a shower seat so you do not need to stand while you shower  Sit on the toilet or a chair in your bathroom to dry yourself and put on clothing  This will prevent you from losing your balance from drying or dressing yourself while you are standing  Keep paths clear  Remove books, shoes, and other objects from walkways and stairs  Place cords for telephones and lamps out of the way so that you do not need to walk over them  Tape them down if you cannot move them  Remove small rugs  If you cannot remove a rug, secure it with double-sided tape  This will prevent you from tripping  Install bright lights in your home  Use night lights to help light paths to the bathroom or kitchen  Always turn on the light before you start walking  Keep items you use often on shelves within reach  Do not use a step stool to help you reach an item  Paint or place reflective tape on the edges of your stairs  This will help you see the stairs better  Call your local emergency number [phone] in the [facility] or have someone call if:   You have fallen and are unconscious  You have fallen and cannot move part of your body  When should I call my doctor? You have fallen and have pain or a headache  You have questions or concerns about your condition or care  CARE AGREEMENT:   You have the right to help plan your care  Learn about your health condition and how it may be treated  Discuss treatment options with your healthcare providers to decide what care you want to receive  You always have the right to refuse treatment  The above information is an  only  It is not intended as medical advice for individual conditions or treatments  Talk to your doctor, nurse or pharmacist before following any medical regimen to see if it is safe and effective for you    © Copyright [employer] 2022 Information is for End User's use only and may not be sold, redistributed or otherwise used for commercial purposes

## (undated) DEVICE — SENSOR SPO2 ADULT LNCS ADTX (20/BX) ORDER ITEM #19593

## (undated) DEVICE — SITE INJ 7/8IN IV M LL ADPR - (100/CA) THIS IS A CUSTOM ITEM AND HAS A 30 DAY LEAD TIME

## (undated) DEVICE — BASIN EMESIS DISP. - (250/CA)

## (undated) DEVICE — KIT  I.V. START (100EA/CA)

## (undated) DEVICE — ELECTRODE 850 FOAM ADHESIVE - HYDROGEL RADIOTRNSPRNT (50/PK)

## (undated) DEVICE — GLOVE, LITE (PAIR)

## (undated) DEVICE — TUBING CLEARLINK DUO-VENT - C-FLO (48EA/CA)

## (undated) DEVICE — SUCTION INSTRUMENT YANKAUER BULBOUS TIP W/O VENT (50EA/CA)

## (undated) DEVICE — BITE BLOCK ADULT 60FR (100EA/CA)

## (undated) DEVICE — MASK ANESTHESIA ADULT  - (100/CA)

## (undated) DEVICE — MASK WITH FACE SHIELD (25/BX 4BX/CA)

## (undated) DEVICE — CANNULA W/ SUPPLY TUBING O2 - (50/CA)

## (undated) DEVICE — SPONGE GAUZE NON-STERILE 4X4 - (2000/CA 10PK/CA)

## (undated) DEVICE — TUBE CONNECTING SUCTION - CLEAR PLASTIC STERILE 72 IN (50EA/CA)

## (undated) DEVICE — NEEDLE 19GA FLEX EUS (5/BX)

## (undated) DEVICE — KIT ANESTHESIA W/CIRCUIT & 3/LT BAG W/FILTER (20EA/CA)

## (undated) DEVICE — TUBE SUCTION YANKAUER  1/4 X 6FT (20EA/CA)"

## (undated) DEVICE — CATHETER IV SAFETY 22 GA X 1 (50EA/BX)

## (undated) DEVICE — GOWN SURGEONS LARGE - (32/CA)

## (undated) DEVICE — SYRINGE DISP. 50CC LS - (40/BX)

## (undated) DEVICE — SET EXTENSION WITH 2 PORTS (48EA/CA) ***PART #2C8610 IS A SUBSTITUTE*****

## (undated) DEVICE — NEPTUNE 4 PORT MANIFOLD - (20/PK)

## (undated) DEVICE — GOWN SURGEONS X-LARGE - DISP. (30/CA)

## (undated) DEVICE — LACTATED RINGERS INJ 1000 ML - (14EA/CA 60CA/PF)